# Patient Record
Sex: FEMALE | Race: WHITE | Employment: OTHER | URBAN - NONMETROPOLITAN AREA
[De-identification: names, ages, dates, MRNs, and addresses within clinical notes are randomized per-mention and may not be internally consistent; named-entity substitution may affect disease eponyms.]

---

## 2017-02-08 DIAGNOSIS — E78.5 HYPERLIPIDEMIA, UNSPECIFIED HYPERLIPIDEMIA TYPE: ICD-10-CM

## 2017-02-08 DIAGNOSIS — I15.9 SECONDARY HYPERTENSION: ICD-10-CM

## 2017-02-08 DIAGNOSIS — E11.69 TYPE 2 DIABETES MELLITUS WITH OTHER SPECIFIED COMPLICATION (HCC): ICD-10-CM

## 2017-02-08 LAB
ALBUMIN SERPL-MCNC: 4.5 G/DL (ref 3.5–5.2)
ALP BLD-CCNC: 92 U/L (ref 35–104)
ALT SERPL-CCNC: 40 U/L (ref 5–33)
ANION GAP SERPL CALCULATED.3IONS-SCNC: 16 MMOL/L (ref 7–19)
AST SERPL-CCNC: 23 U/L (ref 5–32)
BACTERIA: ABNORMAL /HPF
BILIRUB SERPL-MCNC: 0.3 MG/DL (ref 0.2–1.2)
BILIRUBIN URINE: ABNORMAL
BLOOD, URINE: NEGATIVE
BUN BLDV-MCNC: 19 MG/DL (ref 6–20)
CALCIUM SERPL-MCNC: 10.2 MG/DL (ref 8.6–10)
CHLORIDE BLD-SCNC: 98 MMOL/L (ref 98–111)
CHOLESTEROL, TOTAL: 145 MG/DL (ref 160–199)
CLARITY: ABNORMAL
CO2: 26 MMOL/L (ref 22–29)
COLOR: YELLOW
CREAT SERPL-MCNC: 0.6 MG/DL (ref 0.5–0.9)
CRYSTALS, UA: ABNORMAL /HPF
EPITHELIAL CELLS, UA: ABNORMAL /HPF
GFR NON-AFRICAN AMERICAN: >60
GLOBULIN: 3.2 G/DL
GLUCOSE BLD-MCNC: 127 MG/DL (ref 74–109)
GLUCOSE URINE: NEGATIVE MG/DL
HBA1C MFR BLD: 7.5 %
HCT VFR BLD CALC: 44.7 % (ref 37–47)
HDLC SERPL-MCNC: 43 MG/DL (ref 65–121)
HEMOGLOBIN: 14.8 G/DL (ref 12–16)
KETONES, URINE: NEGATIVE MG/DL
LDL CHOLESTEROL CALCULATED: 43 MG/DL
LEUKOCYTE ESTERASE, URINE: NEGATIVE
MCH RBC QN AUTO: 30.5 PG (ref 27–31)
MCHC RBC AUTO-ENTMCNC: 33.1 G/DL (ref 33–37)
MCV RBC AUTO: 92 FL (ref 81–99)
NITRITE, URINE: NEGATIVE
PDW BLD-RTO: 13.2 % (ref 11.5–14.5)
PH UA: 5
PLATELET # BLD: 264 K/UL (ref 130–400)
PMV BLD AUTO: 9.9 FL (ref 7.4–10.4)
POTASSIUM SERPL-SCNC: 4.7 MMOL/L (ref 3.5–5)
PROTEIN UA: 30 MG/DL
RBC # BLD: 4.86 M/UL (ref 4.2–5.4)
RBC UA: ABNORMAL /HPF (ref 0–2)
SODIUM BLD-SCNC: 140 MMOL/L (ref 136–145)
SPECIFIC GRAVITY UA: 1.03
TOTAL PROTEIN: 7.7 G/DL (ref 6.6–8.7)
TRIGL SERPL-MCNC: 297 MG/DL (ref 150–199)
UROBILINOGEN, URINE: 0.2 E.U./DL
WBC # BLD: 9.9 K/UL (ref 4.8–10.8)
WBC UA: ABNORMAL /HPF (ref 0–5)

## 2017-02-17 ENCOUNTER — OFFICE VISIT (OUTPATIENT)
Dept: FAMILY MEDICINE CLINIC | Age: 52
End: 2017-02-17
Payer: COMMERCIAL

## 2017-02-17 VITALS
DIASTOLIC BLOOD PRESSURE: 80 MMHG | HEART RATE: 105 BPM | OXYGEN SATURATION: 95 % | BODY MASS INDEX: 40.22 KG/M2 | TEMPERATURE: 96.8 F | HEIGHT: 63 IN | WEIGHT: 227 LBS | SYSTOLIC BLOOD PRESSURE: 130 MMHG

## 2017-02-17 DIAGNOSIS — Z23 NEED FOR INFLUENZA VACCINATION: ICD-10-CM

## 2017-02-17 DIAGNOSIS — Z12.31 SCREENING MAMMOGRAM, ENCOUNTER FOR: ICD-10-CM

## 2017-02-17 DIAGNOSIS — S69.91XA HAND INJURIES, RIGHT, INITIAL ENCOUNTER: ICD-10-CM

## 2017-02-17 PROBLEM — S69.90XA HAND INJURIES: Status: ACTIVE | Noted: 2017-02-17

## 2017-02-17 PROCEDURE — 90688 IIV4 VACCINE SPLT 0.5 ML IM: CPT | Performed by: FAMILY MEDICINE

## 2017-02-17 PROCEDURE — 99214 OFFICE O/P EST MOD 30 MIN: CPT | Performed by: FAMILY MEDICINE

## 2017-02-17 PROCEDURE — 90471 IMMUNIZATION ADMIN: CPT | Performed by: FAMILY MEDICINE

## 2017-02-17 RX ORDER — NABUMETONE 500 MG/1
500 TABLET, FILM COATED ORAL 2 TIMES DAILY
COMMUNITY
End: 2018-12-06 | Stop reason: SDUPTHER

## 2017-02-17 ASSESSMENT — ENCOUNTER SYMPTOMS
RESPIRATORY NEGATIVE: 1
ALLERGIC/IMMUNOLOGIC NEGATIVE: 1
GASTROINTESTINAL NEGATIVE: 1
EYES NEGATIVE: 1

## 2017-04-13 ENCOUNTER — TELEPHONE (OUTPATIENT)
Dept: FAMILY MEDICINE CLINIC | Age: 52
End: 2017-04-13

## 2017-04-19 ENCOUNTER — TELEPHONE (OUTPATIENT)
Dept: FAMILY MEDICINE CLINIC | Age: 52
End: 2017-04-19

## 2017-04-21 RX ORDER — CYCLOBENZAPRINE HCL 10 MG
TABLET ORAL
Qty: 90 TABLET | Refills: 5 | Status: SHIPPED | OUTPATIENT
Start: 2017-04-21 | End: 2017-10-11 | Stop reason: SDUPTHER

## 2017-04-21 RX ORDER — DULOXETIN HYDROCHLORIDE 60 MG/1
CAPSULE, DELAYED RELEASE ORAL
Qty: 30 CAPSULE | Refills: 5 | Status: SHIPPED | OUTPATIENT
Start: 2017-04-21 | End: 2017-10-11 | Stop reason: SDUPTHER

## 2017-04-26 ENCOUNTER — TELEPHONE (OUTPATIENT)
Dept: FAMILY MEDICINE CLINIC | Age: 52
End: 2017-04-26

## 2017-05-12 RX ORDER — HYDROCHLOROTHIAZIDE 12.5 MG/1
CAPSULE, GELATIN COATED ORAL
Qty: 30 CAPSULE | Refills: 5 | Status: SHIPPED | OUTPATIENT
Start: 2017-05-12 | End: 2017-11-13 | Stop reason: SDUPTHER

## 2017-07-17 ENCOUNTER — TELEPHONE (OUTPATIENT)
Dept: FAMILY MEDICINE CLINIC | Age: 52
End: 2017-07-17

## 2017-07-17 DIAGNOSIS — E11.69 TYPE 2 DIABETES MELLITUS WITH OTHER SPECIFIED COMPLICATION (HCC): Primary | ICD-10-CM

## 2017-08-07 LAB
ALBUMIN SERPL-MCNC: 4.1 G/DL (ref 3.5–5.2)
ALP BLD-CCNC: 71 U/L (ref 35–104)
ALT SERPL-CCNC: 33 U/L (ref 5–33)
ANION GAP SERPL CALCULATED.3IONS-SCNC: 15 MMOL/L (ref 7–19)
AST SERPL-CCNC: 20 U/L (ref 5–32)
BILIRUB SERPL-MCNC: <0.2 MG/DL (ref 0.2–1.2)
BUN BLDV-MCNC: 12 MG/DL (ref 6–20)
CALCIUM SERPL-MCNC: 9.6 MG/DL (ref 8.6–10)
CALCIUM SERPL-MCNC: 9.7 MG/DL (ref 8.6–10)
CHLORIDE BLD-SCNC: 101 MMOL/L (ref 98–111)
CHOLESTEROL, TOTAL: 172 MG/DL (ref 160–199)
CO2: 26 MMOL/L (ref 22–29)
CREAT SERPL-MCNC: 0.5 MG/DL (ref 0.5–0.9)
GFR NON-AFRICAN AMERICAN: >60
GLUCOSE BLD-MCNC: 121 MG/DL (ref 74–109)
HBA1C MFR BLD: 7.9 %
HCT VFR BLD CALC: 43.8 % (ref 37–47)
HDLC SERPL-MCNC: 36 MG/DL (ref 65–121)
HEMOGLOBIN: 14.5 G/DL (ref 12–16)
LDL CHOLESTEROL CALCULATED: ABNORMAL MG/DL
LDL CHOLESTEROL DIRECT: 73 MG/DL
MAGNESIUM: 1.9 MG/DL (ref 1.6–2.6)
MCH RBC QN AUTO: 30.7 PG (ref 27–31)
MCHC RBC AUTO-ENTMCNC: 33.1 G/DL (ref 33–37)
MCV RBC AUTO: 92.6 FL (ref 81–99)
MICROALBUMIN UR-MCNC: 1.5 MG/DL (ref 0–19)
PDW BLD-RTO: 13.2 % (ref 11.5–14.5)
PHOSPHORUS: 3.2 MG/DL (ref 2.5–4.5)
PLATELET # BLD: 260 K/UL (ref 130–400)
PMV BLD AUTO: 9.8 FL (ref 9.4–12.3)
POTASSIUM SERPL-SCNC: 4.4 MMOL/L (ref 3.5–5)
RBC # BLD: 4.73 M/UL (ref 4.2–5.4)
SODIUM BLD-SCNC: 142 MMOL/L (ref 136–145)
TOTAL PROTEIN: 7.4 G/DL (ref 6.6–8.7)
TRIGL SERPL-MCNC: 419 MG/DL (ref 150–199)
VITAMIN D 25-HYDROXY: 32.1 NG/ML
WBC # BLD: 8.4 K/UL (ref 4.8–10.8)

## 2017-09-12 RX ORDER — PEN NEEDLE, DIABETIC 32GX 5/32"
NEEDLE, DISPOSABLE MISCELLANEOUS
Qty: 100 PACKAGE | Refills: 5 | Status: SHIPPED | OUTPATIENT
Start: 2017-09-12 | End: 2021-08-25 | Stop reason: ALTCHOICE

## 2017-10-11 RX ORDER — CYCLOBENZAPRINE HCL 10 MG
TABLET ORAL
Qty: 90 TABLET | Refills: 5 | Status: SHIPPED | OUTPATIENT
Start: 2017-10-11 | End: 2018-01-09 | Stop reason: SDUPTHER

## 2017-10-11 RX ORDER — DULOXETIN HYDROCHLORIDE 60 MG/1
CAPSULE, DELAYED RELEASE ORAL
Qty: 30 CAPSULE | Refills: 5 | Status: SHIPPED | OUTPATIENT
Start: 2017-10-11 | End: 2018-01-09 | Stop reason: SDUPTHER

## 2017-11-13 RX ORDER — HYDROCHLOROTHIAZIDE 12.5 MG/1
CAPSULE, GELATIN COATED ORAL
Qty: 30 CAPSULE | Refills: 5 | Status: SHIPPED | OUTPATIENT
Start: 2017-11-13 | End: 2018-04-29 | Stop reason: SDUPTHER

## 2017-11-17 ENCOUNTER — TELEPHONE (OUTPATIENT)
Dept: FAMILY MEDICINE CLINIC | Age: 52
End: 2017-11-17

## 2017-12-12 ENCOUNTER — APPOINTMENT (OUTPATIENT)
Dept: PREADMISSION TESTING | Facility: HOSPITAL | Age: 52
End: 2017-12-12

## 2017-12-12 VITALS
OXYGEN SATURATION: 93 % | WEIGHT: 225.09 LBS | BODY MASS INDEX: 39.88 KG/M2 | DIASTOLIC BLOOD PRESSURE: 60 MMHG | HEIGHT: 63 IN | HEART RATE: 86 BPM | SYSTOLIC BLOOD PRESSURE: 137 MMHG | RESPIRATION RATE: 18 BRPM

## 2017-12-12 DIAGNOSIS — E78.5 HYPERLIPIDEMIA: ICD-10-CM

## 2017-12-12 DIAGNOSIS — I10 ESSENTIAL HYPERTENSION: ICD-10-CM

## 2017-12-12 LAB
ANION GAP SERPL CALCULATED.3IONS-SCNC: 9 MMOL/L (ref 4–13)
BUN BLD-MCNC: 18 MG/DL (ref 5–21)
BUN/CREAT SERPL: 26.9 (ref 7–25)
CALCIUM SPEC-SCNC: 9.8 MG/DL (ref 8.4–10.4)
CHLORIDE SERPL-SCNC: 100 MMOL/L (ref 98–110)
CO2 SERPL-SCNC: 30 MMOL/L (ref 24–31)
CREAT BLD-MCNC: 0.67 MG/DL (ref 0.5–1.4)
DEPRECATED RDW RBC AUTO: 43.9 FL (ref 40–54)
ERYTHROCYTE [DISTWIDTH] IN BLOOD BY AUTOMATED COUNT: 13.1 % (ref 12–15)
GFR SERPL CREATININE-BSD FRML MDRD: 92 ML/MIN/1.73
GLUCOSE BLD-MCNC: 211 MG/DL (ref 70–100)
HCT VFR BLD AUTO: 42 % (ref 37–47)
HGB BLD-MCNC: 13.6 G/DL (ref 12–16)
MCH RBC QN AUTO: 30 PG (ref 28–32)
MCHC RBC AUTO-ENTMCNC: 32.4 G/DL (ref 33–36)
MCV RBC AUTO: 92.5 FL (ref 82–98)
PLATELET # BLD AUTO: 250 10*3/MM3 (ref 130–400)
PMV BLD AUTO: 10.2 FL (ref 6–12)
POTASSIUM BLD-SCNC: 4 MMOL/L (ref 3.5–5.3)
RBC # BLD AUTO: 4.54 10*6/MM3 (ref 4.2–5.4)
SODIUM BLD-SCNC: 139 MMOL/L (ref 135–145)
WBC NRBC COR # BLD: 8.52 10*3/MM3 (ref 4.8–10.8)

## 2017-12-12 PROCEDURE — 85027 COMPLETE CBC AUTOMATED: CPT | Performed by: PODIATRIST

## 2017-12-12 PROCEDURE — 36415 COLL VENOUS BLD VENIPUNCTURE: CPT

## 2017-12-12 PROCEDURE — 93010 ELECTROCARDIOGRAM REPORT: CPT | Performed by: INTERNAL MEDICINE

## 2017-12-12 PROCEDURE — 80048 BASIC METABOLIC PNL TOTAL CA: CPT | Performed by: PODIATRIST

## 2017-12-12 PROCEDURE — 93005 ELECTROCARDIOGRAM TRACING: CPT

## 2017-12-12 RX ORDER — SIMVASTATIN 20 MG
20 TABLET ORAL NIGHTLY
COMMUNITY

## 2017-12-12 RX ORDER — PHENOL 1.4 %
600 AEROSOL, SPRAY (ML) MUCOUS MEMBRANE EVERY OTHER DAY
COMMUNITY

## 2017-12-12 RX ORDER — NABUMETONE 500 MG/1
500 TABLET, FILM COATED ORAL 2 TIMES DAILY
COMMUNITY
End: 2017-12-21 | Stop reason: HOSPADM

## 2017-12-12 RX ORDER — HYDROCHLOROTHIAZIDE 12.5 MG/1
12.5 TABLET ORAL DAILY
COMMUNITY
End: 2019-08-05 | Stop reason: ALTCHOICE

## 2017-12-12 RX ORDER — DULOXETIN HYDROCHLORIDE 60 MG/1
60 CAPSULE, DELAYED RELEASE ORAL DAILY
COMMUNITY
End: 2020-02-05

## 2017-12-12 RX ORDER — GABAPENTIN 600 MG/1
600 TABLET ORAL 3 TIMES DAILY
COMMUNITY
End: 2019-08-05 | Stop reason: ALTCHOICE

## 2017-12-12 RX ORDER — CHOLECALCIFEROL (VITAMIN D3) 125 MCG
1 CAPSULE ORAL DAILY
COMMUNITY

## 2017-12-12 RX ORDER — LOSARTAN POTASSIUM 50 MG/1
50 TABLET ORAL DAILY
COMMUNITY
End: 2020-02-05

## 2017-12-12 RX ORDER — ASPIRIN 81 MG/1
81 TABLET ORAL DAILY
COMMUNITY

## 2017-12-12 RX ORDER — HYDROCODONE BITARTRATE AND ACETAMINOPHEN 7.5; 325 MG/1; MG/1
1 TABLET ORAL EVERY 8 HOURS PRN
COMMUNITY
End: 2017-12-21 | Stop reason: HOSPADM

## 2017-12-12 RX ORDER — CYCLOBENZAPRINE HCL 10 MG
10 TABLET ORAL 3 TIMES DAILY PRN
COMMUNITY
End: 2019-08-05 | Stop reason: ALTCHOICE

## 2017-12-12 NOTE — DISCHARGE INSTRUCTIONS
DAY OF SURGERY INSTRUCTIONS        YOUR SURGEON: FAINA BORRERO    PROCEDURE: TOTAL ANKLE ARTHROPLASTY WITH INFINITY IMPLANT GASTROCNEMIUS RECESSION RIGHT ANKLE    DATE OF SURGERY: December 19TH 2017    ARRIVAL TIME: AS DIRECTED BY OFFICE    DAY OF SURGERY TAKE ONLY THESE MEDICATIONS: 0        BEFORE YOU COME TO THE HOSPITAL  (Pre-op instructions)  • Do not eat, drink, smoke or chew gum after midnight the night before surgery.  This also includes no mints.  • Morning of surgery take only the medicines you have been instructed with a sip of water unless otherwise instructed  by your physician.  • Do not shave, wear makeup or dark nail polish.  • Remove all jewelry including rings.  • Leave anything you consider valuable at home.  • Leave your suitcase in the car until after your surgery.  • Bring the following with you if applicable:  o Picture ID and insurance, Medicare or Medicaid cards  o Co-pay/deductible required by insurance (cash, check, credit card)  o Copy of advance directive, living will or power-of- documents if not brought to PAT  o CPAP or BIPAP mask and tubing  o Relaxation aids (MP3 player, book, magazine)  • On the day of surgery check in at registration located at the main entrance of the hospital.       Outpatient Surgery Guidelines, Adult  Outpatient procedures are those for which the person having the procedure is allowed to go home the same day as the procedure. Various procedures are done on an outpatient basis. You should follow some general guidelines if you will be having an outpatient procedure.  LET YOUR HEALTH CARE PROVIDER KNOW ABOUT:  · Any allergies you have.  · All medicines you are taking, including vitamins, herbs, eye drops, creams, and over-the-counter medicines.  · Previous problems you or members of your family have had with the use of anesthetics.  · Any blood disorders you have.  · Previous surgeries you have had.  · Medical conditions you have.  RISKS AND  COMPLICATIONS  Your health care provider will discuss possible risks and complications with you before surgery. Common risks and complications include:    · Problems due to the use of anesthetics.  · Blood loss and replacement (does not apply to minor surgical procedures).  · Temporary increase in pain due to surgery.  · Uncorrected pain or problems that the surgery was meant to correct.  · Infection.  · New damage.  BEFORE THE PROCEDURE  · Ask your health care provider about changing or stopping your regular medicines. You may need to stop taking certain medicines in the days or weeks before the procedure.  · Stop smoking at least 2 weeks before surgery. This lowers your risk for complications during and after surgery. Ask your health care provider for help with this if needed.  · Eat your usual meals and a light supper the day before surgery. Continue fluid intake. Do not drink alcohol.  · Do not eat or drink after midnight the night before your surgery.   · Arrange for someone to take you home and to stay with you for 24 hours after the procedure. Medicine given for your procedure may affect your ability to drive or to care for yourself.  · Call your health care provider's office if you develop an illness or problem that may prevent you from safely having your procedure.  AFTER THE PROCEDURE  After surgery, you will be taken to a recovery area, where your progress will be monitored. If there are no complications, you will be allowed to go home when you are awake, stable, and taking fluids well. You may have numbness around the surgical site. Healing will take some time. You will have tenderness at the surgical site and may have some swelling and bruising. You may also have some nausea.  HOME CARE INSTRUCTIONS  · Do not drive for 24 hours, or as directed by your health care provider. Do not drive while taking prescription pain medicines.  · Do not drink alcohol for 24 hours.  · Do not make important decisions or  sign legal documents for 24 hours.  · You may resume a normal diet and activities as directed.  · Do not lift anything heavier than 10 pounds (4.5 kg) or play contact sports until your health care provider says it is okay.  · Change your bandages (dressings) as directed.  · Only take over-the-counter or prescription medicines as directed by your health care provider.  · Follow up with your health care provider as directed.  SEEK MEDICAL CARE IF:  · You have increased bleeding (more than a small spot) from the surgical site.  · You have redness, swelling, or increasing pain in the wound.  · You see pus coming from the wound.  · You have a fever.  · You notice a bad smell coming from the wound or dressing.  · You feel lightheaded or faint.  · You develop a rash.  · You have trouble breathing.  · You develop allergies.  MAKE SURE YOU:  · Understand these instructions.  · Will watch your condition.  · Will get help right away if you are not doing well or get worse.     This information is not intended to replace advice given to you by your health care provider. Make sure you discuss any questions you have with your health care provider.     Document Released: 09/12/2002 Document Revised: 05/03/2016 Document Reviewed: 05/22/2014  [employer] Interactive Patient Education ©2016 [employer]       Fall Prevention in Hospitals, Adult  As a hospital patient, your condition and the treatments you receive can increase your risk for falls. Some additional risk factors for falls in a hospital include:  · Being in an unfamiliar environment.  · Being on bed rest.  · Your surgery.  · Taking certain medicines.  · Your tubing requirements, such as intravenous (IV) therapy or catheters.  It is important that you learn how to decrease fall risks while at the hospital. Below are important tips that can help prevent falls.  SAFETY TIPS FOR PREVENTING FALLS  Talk about your risk of falling.  · Ask your health care provider why you are at  risk for falling. Is it your medicine, illness, tubing placement, or something else?  · Make a plan with your health care provider to keep you safe from falls.  · Ask your health care provider or pharmacist about side effects of your medicines. Some medicines can make you dizzy or affect your coordination.  Ask for help.  · Ask for help before getting out of bed. You may need to press your call button.  · Ask for assistance in getting safely to the toilet.  · Ask for a walker or cane to be put at your bedside. Ask that most of the side rails on your bed be placed up before your health care provider leaves the room.  · Ask family or friends to sit with you.  · Ask for things that are out of your reach, such as your glasses, hearing aids, telephone, bedside table, or call button.  Follow these tips to avoid falling:  · Stay lying or seated, rather than standing, while waiting for help.  · Wear rubber-soled slippers or shoes whenever you walk in the hospital.  · Avoid quick, sudden movements.  ¨ Change positions slowly.  ¨ Sit on the side of your bed before standing.  ¨ Stand up slowly and wait before you start to walk.  · Let your health care provider know if there is a spill on the floor.  · Pay careful attention to the medical equipment, electrical cords, and tubes around you.  · When you need help, use your call button by your bed or in the bathroom. Wait for one of your health care providers to help you.  · If you feel dizzy or unsure of your footing, return to bed and wait for assistance.  · Avoid being distracted by the TV, telephone, or another person in your room.  · Do not lean or support yourself on rolling objects, such as IV poles or bedside tables.     This information is not intended to replace advice given to you by your health care provider. Make sure you discuss any questions you have with your health care provider.     Document Released: 12/15/2001 Document Revised: 01/08/2016 Document Reviewed:  08/25/2013  [employer] Interactive Patient Education ©2016 [employer]       Surgical Site Infections FAQs  What is a Surgical Site Infection (SSI)?  A surgical site infection is an infection that occurs after surgery in the part of the body where the surgery took place. Most patients who have surgery do not develop an infection. However, infections develop in about 1 to 3 out of every 100 patients who have surgery.  Some of the common symptoms of a surgical site infection are:  · Redness and pain around the area where you had surgery  · Drainage of cloudy fluid from your surgical wound  · Fever  Can SSIs be treated?  Yes. Most surgical site infections can be treated with antibiotics. The antibiotic given to you depends on the bacteria (germs) causing the infection. Sometimes patients with SSIs also need another surgery to treat the infection.  What are some of the things that hospitals are doing to prevent SSIs?  To prevent SSIs, doctors, nurses, and other healthcare providers:  · Clean their hands and arms up to their elbows with an antiseptic agent just before the surgery.  · Clean their hands with soap and water or an alcohol-based hand rub before and after caring for each patient.  · May remove some of your hair immediately before your surgery using electric clippers if the hair is in the same area where the procedure will occur. They should not shave you with a razor.  · Wear special hair covers, masks, gowns, and gloves during surgery to keep the surgery area clean.  · Give you antibiotics before your surgery starts. In most cases, you should get antibiotics within 60 minutes before the surgery starts and the antibiotics should be stopped within 24 hours after surgery.  · Clean the skin at the site of your surgery with a special soap that kills germs.  What can I do to help prevent SSIs?  Before your surgery:  · Tell your doctor about other medical problems you may have. Health problems such as allergies,  diabetes, and obesity could affect your surgery and your treatment.  · Quit smoking. Patients who smoke get more infections. Talk to your doctor about how you can quit before your surgery.  · Do not shave near where you will have surgery. Shaving with a razor can irritate your skin and make it easier to develop an infection.  At the time of your surgery:  · Speak up if someone tries to shave you with a razor before surgery. Ask why you need to be shaved and talk with your surgeon if you have any concerns.  · Ask if you will get antibiotics before surgery.  After your surgery:  · Make sure that your healthcare providers clean their hands before examining you, either with soap and water or an alcohol-based hand rub.  · If you do not see your providers clean their hands, please ask them to do so.  · Family and friends who visit you should not touch the surgical wound or dressings.  · Family and friends should clean their hands with soap and water or an alcohol-based hand rub before and after visiting you. If you do not see them clean their hands, ask them to clean their hands.  What do I need to do when I go home from the hospital?  · Before you go home, your doctor or nurse should explain everything you need to know about taking care of your wound. Make sure you understand how to care for your wound before you leave the hospital.  · Always clean your hands before and after caring for your wound.  · Before you go home, make sure you know who to contact if you have questions or problems after you get home.  · If you have any symptoms of an infection, such as redness and pain at the surgery site, drainage, or fever, call your doctor immediately.  If you have additional questions, please ask your doctor or nurse.  Developed and co-sponsored by The Society for Healthcare Epidemiology of [place] (SHEA); Infectious Diseases Society of [place] (IDSA); American Hospital Association; Association for Professionals in Infection  Control and Epidemiology (APIC); Centers for Disease Control and Prevention (CDC); and The Joint Commission.     This information is not intended to replace advice given to you by your health care provider. Make sure you discuss any questions you have with your health care provider.     Document Released: 12/23/2014 Document Revised: 01/08/2016 Document Reviewed: 03/02/2016  [employer] Interactive Patient Education ©2016 [employer]       [facility]  CHG 4% Patient Instruction Sheet    Preparing the Skin Before Surgery  Preparing or “prepping” skin before surgery can reduce the risk of infection at the surgical site. To make the process easier,[facility] has chosen 4% Chlorhexidine Gluconate (CHG) antiseptic solution.   The steps below outline the prepping process and should be carefully followed.                                                                                                                                                      Prep the skin at the following time(s):                                                      We recommend you shower the night before surgery, and again the morning of surgery with the 4% CHG antiseptic solution using half of the bottle and a cloth each time.  Dress in clean clothes/sleepwear after showering.  See instructions below for application.          Do not apply any lotions or moisturizers.       Do not shave the area to be prepped for at least 2 days prior to surgery.    Clipping the hair may be done immediately prior to your surgery at the hospital    if needed.    Directions:  Thoroughly rinse your body with water.  Apply 4% CHG to a cloth and wash skin gently, paying special attention to the operative site.  Rinse again thoroughly.  Once you have begun using this product do not apply anything else to your skin. If itching or redness persists, rinse affected areas and discontinue use.    When using this product:  • Keep out of eyes, ears, and mouth.  • If  solution should contact these areas, rinse out promptly and thoroughly with water.  • For external use only.  • Do not use in genital area, on your face or head.      PATIENT/FAMILY/RESPONSIBLE PARTY VERBALIZES UNDERSTANDING OF ABOVE EDUCATION.  COPY OF PAIN SCALE GIVEN AND REVIEWED WITH VERBALIZED UNDERSTANDING.

## 2017-12-13 RX ORDER — LOSARTAN POTASSIUM 50 MG/1
TABLET ORAL
Qty: 90 TABLET | Refills: 1 | Status: SHIPPED | OUTPATIENT
Start: 2017-12-13 | End: 2018-01-09 | Stop reason: SDUPTHER

## 2017-12-13 RX ORDER — SIMVASTATIN 20 MG
TABLET ORAL
Qty: 90 TABLET | Refills: 1 | Status: SHIPPED | OUTPATIENT
Start: 2017-12-13 | End: 2018-01-09 | Stop reason: SDUPTHER

## 2017-12-13 RX ORDER — INSULIN GLARGINE 300 U/ML
INJECTION, SOLUTION SUBCUTANEOUS
Qty: 3 PEN | Refills: 7 | Status: SHIPPED | OUTPATIENT
Start: 2017-12-13 | End: 2018-01-09 | Stop reason: SDUPTHER

## 2017-12-19 ENCOUNTER — APPOINTMENT (OUTPATIENT)
Dept: GENERAL RADIOLOGY | Facility: HOSPITAL | Age: 52
End: 2017-12-19

## 2017-12-19 ENCOUNTER — ANESTHESIA EVENT (OUTPATIENT)
Dept: PERIOP | Facility: HOSPITAL | Age: 52
End: 2017-12-19

## 2017-12-19 ENCOUNTER — ANESTHESIA (OUTPATIENT)
Dept: PERIOP | Facility: HOSPITAL | Age: 52
End: 2017-12-19

## 2017-12-19 ENCOUNTER — HOSPITAL ENCOUNTER (INPATIENT)
Facility: HOSPITAL | Age: 52
LOS: 2 days | Discharge: HOME | End: 2017-12-21
Attending: PODIATRIST | Admitting: PODIATRIST

## 2017-12-19 DIAGNOSIS — Z74.09 IMPAIRED MOBILITY: ICD-10-CM

## 2017-12-19 PROBLEM — M19.171 POST-TRAUMATIC ARTHRITIS OF ANKLE, RIGHT: Status: ACTIVE | Noted: 2017-12-19

## 2017-12-19 LAB
AVERAGE GLUCOSE: NORMAL
GLUCOSE BLDC GLUCOMTR-MCNC: 104 MG/DL (ref 70–130)
GLUCOSE BLDC GLUCOMTR-MCNC: 120 MG/DL (ref 70–130)
GLUCOSE BLDC GLUCOMTR-MCNC: 196 MG/DL (ref 70–130)
GLUCOSE BLDC GLUCOMTR-MCNC: 279 MG/DL (ref 70–130)
HBA1C MFR BLD: 6.4 %
HBA1C MFR BLD: 6.4 %

## 2017-12-19 PROCEDURE — 25010000002 SUCCINYLCHOLINE PER 20 MG: Performed by: NURSE ANESTHETIST, CERTIFIED REGISTERED

## 2017-12-19 PROCEDURE — 82962 GLUCOSE BLOOD TEST: CPT

## 2017-12-19 PROCEDURE — 0SRF0JZ REPLACEMENT OF RIGHT ANKLE JOINT WITH SYNTHETIC SUBSTITUTE, OPEN APPROACH: ICD-10-PCS | Performed by: PODIATRIST

## 2017-12-19 PROCEDURE — 94799 UNLISTED PULMONARY SVC/PX: CPT

## 2017-12-19 PROCEDURE — 25010000002 DEXAMETHASONE PER 1 MG: Performed by: NURSE ANESTHETIST, CERTIFIED REGISTERED

## 2017-12-19 PROCEDURE — 25010000002 CEFAZOLIN PER 500 MG: Performed by: PODIATRIST

## 2017-12-19 PROCEDURE — 25010000002 MORPHINE PER 10 MG: Performed by: NURSE ANESTHETIST, CERTIFIED REGISTERED

## 2017-12-19 PROCEDURE — 25010000002 ONDANSETRON PER 1 MG: Performed by: NURSE ANESTHETIST, CERTIFIED REGISTERED

## 2017-12-19 PROCEDURE — 73600 X-RAY EXAM OF ANKLE: CPT

## 2017-12-19 PROCEDURE — 25010000003 CEFAZOLIN PER 500 MG: Performed by: PODIATRIST

## 2017-12-19 PROCEDURE — 83036 HEMOGLOBIN GLYCOSYLATED A1C: CPT | Performed by: NURSE PRACTITIONER

## 2017-12-19 PROCEDURE — 63710000001 INSULIN LISPRO (HUMAN) PER 5 UNITS: Performed by: NURSE PRACTITIONER

## 2017-12-19 PROCEDURE — 25010000002 HYDROMORPHONE PER 4 MG: Performed by: PODIATRIST

## 2017-12-19 PROCEDURE — 25010000002 MIDAZOLAM PER 1 MG: Performed by: ANESTHESIOLOGY

## 2017-12-19 PROCEDURE — C1776 JOINT DEVICE (IMPLANTABLE): HCPCS | Performed by: PODIATRIST

## 2017-12-19 PROCEDURE — 25010000002 PROPOFOL 10 MG/ML EMULSION: Performed by: NURSE ANESTHETIST, CERTIFIED REGISTERED

## 2017-12-19 PROCEDURE — C1713 ANCHOR/SCREW BN/BN,TIS/BN: HCPCS | Performed by: PODIATRIST

## 2017-12-19 PROCEDURE — 0L8N0ZZ DIVISION OF RIGHT LOWER LEG TENDON, OPEN APPROACH: ICD-10-PCS | Performed by: PODIATRIST

## 2017-12-19 PROCEDURE — 25010000002 HYDROMORPHONE PER 4 MG: Performed by: ANESTHESIOLOGY

## 2017-12-19 PROCEDURE — 25010000002 ONDANSETRON PER 1 MG: Performed by: ANESTHESIOLOGY

## 2017-12-19 PROCEDURE — 76000 FLUOROSCOPY <1 HR PHYS/QHP: CPT

## 2017-12-19 PROCEDURE — 25010000002 FENTANYL CITRATE (PF) 100 MCG/2ML SOLUTION: Performed by: ANESTHESIOLOGY

## 2017-12-19 PROCEDURE — 25010000002 FENTANYL CITRATE (PF) 250 MCG/5ML SOLUTION: Performed by: NURSE ANESTHETIST, CERTIFIED REGISTERED

## 2017-12-19 DEVICE — IMPLANTABLE DEVICE
Type: IMPLANTABLE DEVICE | Status: FUNCTIONAL
Brand: INFINITY

## 2017-12-19 DEVICE — ALLOGRFT ACTISHIELD AMNIO BARR 4X8CM: Type: IMPLANTABLE DEVICE | Status: FUNCTIONAL

## 2017-12-19 RX ORDER — OXYCODONE AND ACETAMINOPHEN 10; 325 MG/1; MG/1
1 TABLET ORAL EVERY 4 HOURS PRN
Status: DISCONTINUED | OUTPATIENT
Start: 2017-12-19 | End: 2017-12-21 | Stop reason: HOSPADM

## 2017-12-19 RX ORDER — FENTANYL CITRATE 50 UG/ML
INJECTION, SOLUTION INTRAMUSCULAR; INTRAVENOUS AS NEEDED
Status: DISCONTINUED | OUTPATIENT
Start: 2017-12-19 | End: 2017-12-19 | Stop reason: SURG

## 2017-12-19 RX ORDER — DEXAMETHASONE SODIUM PHOSPHATE 4 MG/ML
INJECTION, SOLUTION INTRA-ARTICULAR; INTRALESIONAL; INTRAMUSCULAR; INTRAVENOUS; SOFT TISSUE AS NEEDED
Status: DISCONTINUED | OUTPATIENT
Start: 2017-12-19 | End: 2017-12-19 | Stop reason: SURG

## 2017-12-19 RX ORDER — MAGNESIUM HYDROXIDE 1200 MG/15ML
LIQUID ORAL AS NEEDED
Status: DISCONTINUED | OUTPATIENT
Start: 2017-12-19 | End: 2017-12-19 | Stop reason: HOSPADM

## 2017-12-19 RX ORDER — HYDROCHLOROTHIAZIDE 25 MG/1
12.5 TABLET ORAL DAILY
Status: DISCONTINUED | OUTPATIENT
Start: 2017-12-19 | End: 2017-12-21 | Stop reason: HOSPADM

## 2017-12-19 RX ORDER — PHENYLEPHRINE HCL IN 0.9% NACL 0.8MG/10ML
SYRINGE (ML) INTRAVENOUS AS NEEDED
Status: DISCONTINUED | OUTPATIENT
Start: 2017-12-19 | End: 2017-12-19 | Stop reason: SURG

## 2017-12-19 RX ORDER — CEFAZOLIN SODIUM 2 G/100ML
2 INJECTION, SOLUTION INTRAVENOUS EVERY 8 HOURS
Status: COMPLETED | OUTPATIENT
Start: 2017-12-19 | End: 2017-12-20

## 2017-12-19 RX ORDER — SODIUM CHLORIDE 0.9 % (FLUSH) 0.9 %
3 SYRINGE (ML) INJECTION AS NEEDED
Status: DISCONTINUED | OUTPATIENT
Start: 2017-12-19 | End: 2017-12-19 | Stop reason: HOSPADM

## 2017-12-19 RX ORDER — LOSARTAN POTASSIUM 50 MG/1
50 TABLET ORAL DAILY
Status: DISCONTINUED | OUTPATIENT
Start: 2017-12-19 | End: 2017-12-21 | Stop reason: HOSPADM

## 2017-12-19 RX ORDER — ATORVASTATIN CALCIUM 10 MG/1
10 TABLET, FILM COATED ORAL NIGHTLY
Status: DISCONTINUED | OUTPATIENT
Start: 2017-12-19 | End: 2017-12-21 | Stop reason: HOSPADM

## 2017-12-19 RX ORDER — METOCLOPRAMIDE HYDROCHLORIDE 5 MG/ML
5 INJECTION INTRAMUSCULAR; INTRAVENOUS
Status: DISCONTINUED | OUTPATIENT
Start: 2017-12-19 | End: 2017-12-19 | Stop reason: HOSPADM

## 2017-12-19 RX ORDER — MORPHINE SULFATE 10 MG/ML
INJECTION INTRAMUSCULAR; INTRAVENOUS; SUBCUTANEOUS AS NEEDED
Status: DISCONTINUED | OUTPATIENT
Start: 2017-12-19 | End: 2017-12-19 | Stop reason: SURG

## 2017-12-19 RX ORDER — ROCURONIUM BROMIDE 10 MG/ML
INJECTION, SOLUTION INTRAVENOUS AS NEEDED
Status: DISCONTINUED | OUTPATIENT
Start: 2017-12-19 | End: 2017-12-19 | Stop reason: SURG

## 2017-12-19 RX ORDER — ONDANSETRON 2 MG/ML
4 INJECTION INTRAMUSCULAR; INTRAVENOUS AS NEEDED
Status: DISCONTINUED | OUTPATIENT
Start: 2017-12-19 | End: 2017-12-19 | Stop reason: HOSPADM

## 2017-12-19 RX ORDER — DULOXETIN HYDROCHLORIDE 30 MG/1
60 CAPSULE, DELAYED RELEASE ORAL DAILY
Status: DISCONTINUED | OUTPATIENT
Start: 2017-12-19 | End: 2017-12-21 | Stop reason: HOSPADM

## 2017-12-19 RX ORDER — SODIUM CHLORIDE, SODIUM LACTATE, POTASSIUM CHLORIDE, CALCIUM CHLORIDE 600; 310; 30; 20 MG/100ML; MG/100ML; MG/100ML; MG/100ML
100 INJECTION, SOLUTION INTRAVENOUS CONTINUOUS
Status: DISCONTINUED | OUTPATIENT
Start: 2017-12-19 | End: 2017-12-21 | Stop reason: HOSPADM

## 2017-12-19 RX ORDER — MORPHINE SULFATE 2 MG/ML
2 INJECTION, SOLUTION INTRAMUSCULAR; INTRAVENOUS AS NEEDED
Status: DISCONTINUED | OUTPATIENT
Start: 2017-12-19 | End: 2017-12-19 | Stop reason: HOSPADM

## 2017-12-19 RX ORDER — MEPERIDINE HYDROCHLORIDE 25 MG/ML
12.5 INJECTION INTRAMUSCULAR; INTRAVENOUS; SUBCUTANEOUS
Status: DISCONTINUED | OUTPATIENT
Start: 2017-12-19 | End: 2017-12-19 | Stop reason: HOSPADM

## 2017-12-19 RX ORDER — LABETALOL HYDROCHLORIDE 5 MG/ML
5 INJECTION, SOLUTION INTRAVENOUS
Status: DISCONTINUED | OUTPATIENT
Start: 2017-12-19 | End: 2017-12-19 | Stop reason: HOSPADM

## 2017-12-19 RX ORDER — IPRATROPIUM BROMIDE AND ALBUTEROL SULFATE 2.5; .5 MG/3ML; MG/3ML
3 SOLUTION RESPIRATORY (INHALATION) ONCE AS NEEDED
Status: DISCONTINUED | OUTPATIENT
Start: 2017-12-19 | End: 2017-12-19 | Stop reason: HOSPADM

## 2017-12-19 RX ORDER — GABAPENTIN 300 MG/1
600 CAPSULE ORAL EVERY 8 HOURS SCHEDULED
Status: DISCONTINUED | OUTPATIENT
Start: 2017-12-19 | End: 2017-12-21 | Stop reason: HOSPADM

## 2017-12-19 RX ORDER — HYDRALAZINE HYDROCHLORIDE 20 MG/ML
5 INJECTION INTRAMUSCULAR; INTRAVENOUS
Status: DISCONTINUED | OUTPATIENT
Start: 2017-12-19 | End: 2017-12-19 | Stop reason: HOSPADM

## 2017-12-19 RX ORDER — SODIUM CHLORIDE, SODIUM LACTATE, POTASSIUM CHLORIDE, CALCIUM CHLORIDE 600; 310; 30; 20 MG/100ML; MG/100ML; MG/100ML; MG/100ML
1000 INJECTION, SOLUTION INTRAVENOUS CONTINUOUS
Status: DISPENSED | OUTPATIENT
Start: 2017-12-19 | End: 2017-12-21

## 2017-12-19 RX ORDER — FENTANYL CITRATE 50 UG/ML
100 INJECTION, SOLUTION INTRAMUSCULAR; INTRAVENOUS ONCE
Status: COMPLETED | OUTPATIENT
Start: 2017-12-19 | End: 2017-12-19

## 2017-12-19 RX ORDER — FLUMAZENIL 0.1 MG/ML
0.2 INJECTION INTRAVENOUS AS NEEDED
Status: DISCONTINUED | OUTPATIENT
Start: 2017-12-19 | End: 2017-12-19 | Stop reason: HOSPADM

## 2017-12-19 RX ORDER — SUCCINYLCHOLINE CHLORIDE 20 MG/ML
INJECTION INTRAMUSCULAR; INTRAVENOUS AS NEEDED
Status: DISCONTINUED | OUTPATIENT
Start: 2017-12-19 | End: 2017-12-19 | Stop reason: SURG

## 2017-12-19 RX ORDER — DIAZEPAM 5 MG/1
5 TABLET ORAL EVERY 6 HOURS PRN
Status: DISCONTINUED | OUTPATIENT
Start: 2017-12-19 | End: 2017-12-21 | Stop reason: HOSPADM

## 2017-12-19 RX ORDER — MIDAZOLAM HYDROCHLORIDE 1 MG/ML
2 INJECTION INTRAMUSCULAR; INTRAVENOUS
Status: DISCONTINUED | OUTPATIENT
Start: 2017-12-19 | End: 2017-12-19 | Stop reason: HOSPADM

## 2017-12-19 RX ORDER — SCOLOPAMINE TRANSDERMAL SYSTEM 1 MG/1
1 PATCH, EXTENDED RELEASE TRANSDERMAL ONCE
Status: DISCONTINUED | OUTPATIENT
Start: 2017-12-19 | End: 2017-12-20

## 2017-12-19 RX ORDER — HYDROMORPHONE HCL 110MG/55ML
0.5 PATIENT CONTROLLED ANALGESIA SYRINGE INTRAVENOUS
Status: DISCONTINUED | OUTPATIENT
Start: 2017-12-19 | End: 2017-12-21 | Stop reason: HOSPADM

## 2017-12-19 RX ORDER — ONDANSETRON 2 MG/ML
INJECTION INTRAMUSCULAR; INTRAVENOUS AS NEEDED
Status: DISCONTINUED | OUTPATIENT
Start: 2017-12-19 | End: 2017-12-19 | Stop reason: SURG

## 2017-12-19 RX ORDER — SODIUM CHLORIDE 0.9 % (FLUSH) 0.9 %
1-10 SYRINGE (ML) INJECTION AS NEEDED
Status: DISCONTINUED | OUTPATIENT
Start: 2017-12-19 | End: 2017-12-19 | Stop reason: HOSPADM

## 2017-12-19 RX ORDER — ONDANSETRON 2 MG/ML
4 INJECTION INTRAMUSCULAR; INTRAVENOUS EVERY 6 HOURS PRN
Status: DISCONTINUED | OUTPATIENT
Start: 2017-12-19 | End: 2017-12-21 | Stop reason: HOSPADM

## 2017-12-19 RX ORDER — CYCLOBENZAPRINE HCL 10 MG
10 TABLET ORAL 3 TIMES DAILY PRN
Status: DISCONTINUED | OUTPATIENT
Start: 2017-12-19 | End: 2017-12-21 | Stop reason: HOSPADM

## 2017-12-19 RX ORDER — NICOTINE 21 MG/24HR
1 PATCH, TRANSDERMAL 24 HOURS TRANSDERMAL EVERY 24 HOURS
Status: DISCONTINUED | OUTPATIENT
Start: 2017-12-19 | End: 2017-12-21 | Stop reason: HOSPADM

## 2017-12-19 RX ORDER — NICOTINE POLACRILEX 4 MG
15 LOZENGE BUCCAL
Status: DISCONTINUED | OUTPATIENT
Start: 2017-12-19 | End: 2017-12-21 | Stop reason: HOSPADM

## 2017-12-19 RX ORDER — NALOXONE HCL 0.4 MG/ML
0.04 VIAL (ML) INJECTION AS NEEDED
Status: DISCONTINUED | OUTPATIENT
Start: 2017-12-19 | End: 2017-12-19 | Stop reason: HOSPADM

## 2017-12-19 RX ORDER — NALOXONE HCL 0.4 MG/ML
0.4 VIAL (ML) INJECTION
Status: DISCONTINUED | OUTPATIENT
Start: 2017-12-19 | End: 2017-12-21 | Stop reason: HOSPADM

## 2017-12-19 RX ORDER — PROMETHAZINE HYDROCHLORIDE 25 MG/ML
12.5 INJECTION, SOLUTION INTRAMUSCULAR; INTRAVENOUS EVERY 4 HOURS PRN
Status: DISCONTINUED | OUTPATIENT
Start: 2017-12-19 | End: 2017-12-21 | Stop reason: HOSPADM

## 2017-12-19 RX ORDER — PROPOFOL 10 MG/ML
VIAL (ML) INTRAVENOUS AS NEEDED
Status: DISCONTINUED | OUTPATIENT
Start: 2017-12-19 | End: 2017-12-19 | Stop reason: SURG

## 2017-12-19 RX ORDER — MIDAZOLAM HYDROCHLORIDE 1 MG/ML
1 INJECTION INTRAMUSCULAR; INTRAVENOUS
Status: DISCONTINUED | OUTPATIENT
Start: 2017-12-19 | End: 2017-12-19 | Stop reason: HOSPADM

## 2017-12-19 RX ORDER — DEXTROSE MONOHYDRATE 25 G/50ML
25 INJECTION, SOLUTION INTRAVENOUS
Status: DISCONTINUED | OUTPATIENT
Start: 2017-12-19 | End: 2017-12-21 | Stop reason: HOSPADM

## 2017-12-19 RX ADMIN — CYCLOBENZAPRINE HYDROCHLORIDE 10 MG: 10 TABLET, FILM COATED ORAL at 17:38

## 2017-12-19 RX ADMIN — ATORVASTATIN CALCIUM 10 MG: 10 TABLET, FILM COATED ORAL at 21:52

## 2017-12-19 RX ADMIN — FENTANYL CITRATE 250 MCG: 50 INJECTION INTRAMUSCULAR; INTRAVENOUS at 11:45

## 2017-12-19 RX ADMIN — MORPHINE SULFATE 10 MG: 10 INJECTION, SOLUTION INTRAMUSCULAR; INTRAVENOUS at 13:42

## 2017-12-19 RX ADMIN — HYDROMORPHONE HYDROCHLORIDE 1 MG: 1 INJECTION, SOLUTION INTRAMUSCULAR; INTRAVENOUS; SUBCUTANEOUS at 15:35

## 2017-12-19 RX ADMIN — DIAZEPAM 5 MG: 5 TABLET ORAL at 16:57

## 2017-12-19 RX ADMIN — MIDAZOLAM HYDROCHLORIDE 2 MG: 1 INJECTION, SOLUTION INTRAMUSCULAR; INTRAVENOUS at 10:24

## 2017-12-19 RX ADMIN — Medication 80 MCG: at 12:00

## 2017-12-19 RX ADMIN — SODIUM CHLORIDE, POTASSIUM CHLORIDE, SODIUM LACTATE AND CALCIUM CHLORIDE 100 ML/HR: 600; 310; 30; 20 INJECTION, SOLUTION INTRAVENOUS at 11:00

## 2017-12-19 RX ADMIN — CEFAZOLIN SODIUM 2 G: 2 INJECTION, SOLUTION INTRAVENOUS at 21:53

## 2017-12-19 RX ADMIN — CHOLECALCIFEROL CAP 125 MCG (5000 UNIT) 5000 UNITS: 125 CAP at 17:38

## 2017-12-19 RX ADMIN — CEFAZOLIN SODIUM 2 G: 2 SOLUTION INTRAVENOUS at 11:49

## 2017-12-19 RX ADMIN — HYDROMORPHONE HYDROCHLORIDE 0.5 MG: 1 INJECTION, SOLUTION INTRAMUSCULAR; INTRAVENOUS; SUBCUTANEOUS at 15:09

## 2017-12-19 RX ADMIN — SCOPALAMINE 1 PATCH: 1 PATCH, EXTENDED RELEASE TRANSDERMAL at 10:19

## 2017-12-19 RX ADMIN — DEXAMETHASONE SODIUM PHOSPHATE 4 MG: 4 INJECTION, SOLUTION INTRAMUSCULAR; INTRAVENOUS at 13:42

## 2017-12-19 RX ADMIN — ONDANSETRON HYDROCHLORIDE 4 MG: 2 SOLUTION INTRAMUSCULAR; INTRAVENOUS at 13:42

## 2017-12-19 RX ADMIN — HYDROCHLOROTHIAZIDE 12.5 MG: 25 TABLET ORAL at 17:37

## 2017-12-19 RX ADMIN — INSULIN LISPRO 4 UNITS: 100 INJECTION, SOLUTION INTRAVENOUS; SUBCUTANEOUS at 22:08

## 2017-12-19 RX ADMIN — HYDROMORPHONE HYDROCHLORIDE 0.5 MG: 2 INJECTION, SOLUTION INTRAMUSCULAR; INTRAVENOUS; SUBCUTANEOUS at 20:00

## 2017-12-19 RX ADMIN — ONDANSETRON 4 MG: 2 INJECTION, SOLUTION INTRAMUSCULAR; INTRAVENOUS at 14:56

## 2017-12-19 RX ADMIN — LIDOCAINE HYDROCHLORIDE 0.5 ML: 10 INJECTION, SOLUTION EPIDURAL; INFILTRATION; INTRACAUDAL; PERINEURAL at 09:54

## 2017-12-19 RX ADMIN — SODIUM CHLORIDE, POTASSIUM CHLORIDE, SODIUM LACTATE AND CALCIUM CHLORIDE 1000 ML: 600; 310; 30; 20 INJECTION, SOLUTION INTRAVENOUS at 15:13

## 2017-12-19 RX ADMIN — LOSARTAN POTASSIUM 50 MG: 50 TABLET ORAL at 17:38

## 2017-12-19 RX ADMIN — PROPOFOL 200 MG: 10 INJECTION, EMULSION INTRAVENOUS at 11:45

## 2017-12-19 RX ADMIN — INSULIN LISPRO 2 UNITS: 100 INJECTION, SOLUTION INTRAVENOUS; SUBCUTANEOUS at 18:20

## 2017-12-19 RX ADMIN — SODIUM CHLORIDE, POTASSIUM CHLORIDE, SODIUM LACTATE AND CALCIUM CHLORIDE 1000 ML: 600; 310; 30; 20 INJECTION, SOLUTION INTRAVENOUS at 09:54

## 2017-12-19 RX ADMIN — GABAPENTIN 600 MG: 300 CAPSULE ORAL at 18:20

## 2017-12-19 RX ADMIN — SODIUM CHLORIDE, POTASSIUM CHLORIDE, SODIUM LACTATE AND CALCIUM CHLORIDE: 600; 310; 30; 20 INJECTION, SOLUTION INTRAVENOUS at 12:15

## 2017-12-19 RX ADMIN — Medication 80 MCG: at 12:38

## 2017-12-19 RX ADMIN — MIDAZOLAM HYDROCHLORIDE 2 MG: 1 INJECTION, SOLUTION INTRAMUSCULAR; INTRAVENOUS at 10:20

## 2017-12-19 RX ADMIN — HYDROMORPHONE HYDROCHLORIDE 0.5 MG: 1 INJECTION, SOLUTION INTRAMUSCULAR; INTRAVENOUS; SUBCUTANEOUS at 14:57

## 2017-12-19 RX ADMIN — SUCCINYLCHOLINE CHLORIDE 160 MG: 20 INJECTION, SOLUTION INTRAMUSCULAR; INTRAVENOUS at 11:45

## 2017-12-19 RX ADMIN — Medication 80 MCG: at 12:21

## 2017-12-19 RX ADMIN — NICOTINE 1 PATCH: 21 PATCH, EXTENDED RELEASE TRANSDERMAL at 17:38

## 2017-12-19 RX ADMIN — FENTANYL CITRATE 100 MCG: 50 INJECTION, SOLUTION INTRAMUSCULAR; INTRAVENOUS at 10:19

## 2017-12-19 RX ADMIN — ROCURONIUM BROMIDE 5 MG: 10 INJECTION INTRAVENOUS at 11:45

## 2017-12-19 RX ADMIN — DULOXETINE HYDROCHLORIDE 60 MG: 30 CAPSULE, DELAYED RELEASE ORAL at 17:38

## 2017-12-19 RX ADMIN — Medication 80 MCG: at 12:10

## 2017-12-19 RX ADMIN — OXYCODONE HYDROCHLORIDE AND ACETAMINOPHEN 1 TABLET: 10; 325 TABLET ORAL at 21:52

## 2017-12-19 NOTE — PLAN OF CARE
Problem: Perioperative Period (Adult)  Goal: Signs and Symptoms of Listed Potential Problems Will be Absent or Manageable (Perioperative Period)  Outcome: Ongoing (interventions implemented as appropriate)    12/19/17 1008   Perioperative Period   Problems Assessed (Perioperative Period) pain;hypothermia;hypoxia/hypoxemia;infection;situational response   Problems Present (Perioperative Period) situational response;pain

## 2017-12-19 NOTE — OP NOTE
ANKLE ARTHROPLASTY, RECESSION GASTROCNEMIUS  Procedure Note    Riri Denise  12/19/2017    Pre-op Diagnosis:   POST TRAUMATIC ARTHRITIS, HEEL CORD CONTRACTURE, RIGHT ANKLE  * Post-traumatic arthritis of ankle, right [M19.171]     * Heel cord contracture [M67.00]     * Pain, ankle [M25.579]    Post-op Diagnosis:     Post-Op Diagnosis Codes:     * Post-traumatic arthritis of ankle, right [M19.171]     * Heel cord contracture [M67.00]     * Pain, ankle [M25.579]    Procedure/CPT® Codes:      Procedure(s):  1)  TOTAL ANKLE ARTHROPLASTY WITH INFINITY IMPLANT    2)  GASTROCNEMIUS RECESSION RIGHT ANKLE     Surgeon(s):  Herve Bojorquez DPM    Anesthesia: General with Block    Staff:   Circulator: Nik Addison RN; Silvia Fernandez RN; Mariza Bey RN  Scrub Person: Jeff Caceres; Cayden Carvalho  Vendor Representative: Duglas Merino  Assistant: Latrice Barreto    Indications for procedure:  Chronic right ankle pain.  She has exhausted conservative measures including bracing corticaosteroid injection, non-steroidal anti-inflammatory medications and shoe gear changes.    Procedure details:  The patient was brought into the operating room and placed under general anesthesia.  A preoperative regional block had been performed by anesthesia in preoperative holding area.  Right lower extremity prepped and draped in usual sterile fashion.    Procedure #1 gastrocnemius recession right    Attention was then directed to the posterior aspect patient's right leg.  A 3.5 m incision was made.  Was deepened utilizing sharp and blunt dissection technique.  A linear incision was created in the peritenon.  The foot was dorsiflexed and the fascia overlying the gastrocnemius muscle belly was lengthened and Clif fashion.  Peritenon repaired utilizing 2-0 Vicryl subcutaneous case tissues were proximal was not 2-0 Vicryl and skin is reported(s) and 3-0 nylon.    Procedure #2 total ankle arthroplasty with implant Infinity type right  ankle    Attention was then directed to the anterior ankle where approximately 13 cm linear incision was made just lateral to the tendon of tibialis anterior.  It was deepened utilizing sharp and blunt dissection technique.  The extensor retinaculum was then incised tibialis anterior tendon retracted medially neurovascular structures and extensor tendons retracted laterally.  A linear capsular periosteal incision was then made.  The ankle joint was then exposed.  The tibial alignment guide was then placed temporally fixated.  Fluoroscopic examination was performed.  The alignment guide was then removed and the cut block was placed.  Tibial cuts were then performed.  Anterior portion of the tibial cut was then removed the tibial talar alignment guide was then placed.  Was fixated.  Fluoroscopic examination was performed.  The talar cut was then performed.  Attention was then directed to the posterior ankle where the remainder of the tibial bone was removed.  Size 3 tibial tray trial was then introduced.  The review does create peg drills in the tibia.  The polyethylene talar trial was then introduced through the tibiotalar trial temporally fixated.  The talar cut guide was then placed.  Posterior talar cut and anterior chamfer cuts were performed.  Wound was then irrigated.  A size 2 talus was then tamped into place.  6 mm poly-spacer was tamped into place.  Medial release the deep deltoid ligament was performed.  Wound was then irrigated.  Fluoroscopic examination was performed.    Closure Performed in layers.  A well-padded posterior and sugar tong type splint applied.                Estimated Blood Loss: none    Specimens:                None      Drains:   Ureteral Catheter 12/19/17 1150 (Active)           Implants:   Implant Name Type Inv. Item Serial No.  Lot No. LRB No. Used   DOME TALAR INFINITY SZ2 - [id number] Implant DOME TALAR INFINITY SZ2  [employer] [id number] Right 1   INFINITY TIBIAL  TRAY PLASMA SPRAY SZ 3 STD    [place] [employer] [id number] Right 1   INSRT POLY INFINITY SZ2 PLS 6MM - [id number] Implant INSRT POLY INFINITY SZ2 PLS 6MM  [employer] [id number] Right 1   ALLOGRFT ACTISHIELD AMNIO VAZQUEZ 4X8CM - [id number] Implant ALLOGRFT ACTISHIELD AMNIO VAZQUEZ 4X8CM   [employer] [id number] Right 1        Complications: none    Follow up:   Will admitted for postoperative pain control.    Herve Bojorquez DPM     Date: 12/19/2017  Time: 2:34 PM

## 2017-12-19 NOTE — CONSULTS
[facility] Medicine Consult  Inpatient Consult to Hospitalist  Consult performed by: SOCRATES RODRIGUEZ  Consult ordered by: FAINA BOJORQUEZ          Date of Admission: 12/19/2017  Date of Consult: 12/19/17    Primary Care Physician: Patricio Lema MD  Referring Physician: Onur Bojorquez MD   Chief Complaint/Reason for Consultation: Diabetes management     Subjective   History of Present Illness  The patient is a 52 year old  female who is admitted to Dr. Bojorquez for a total ankle arthroplasty that was done today.  She has a past medical history of diabetes, hypertension, arthritis, obesity, tobacco abuse and mitral valve prolapse.  She currently denies any pain but states that she is having muscle spasms.  She just took a valium for it and is hoping that it helps.  She states that Dr. Lema is her physician and has not had her hemoglobin A1c checked in quite some time.  She denies any issues or complaints currently. Blood pressure currently controlled and glucose is 104.    Review of Systems   Constitutional: Negative for activity change, appetite change, chills and fever.   HENT: Negative for hearing loss, nosebleeds, tinnitus and trouble swallowing.    Eyes: Negative for visual disturbance.   Respiratory: Negative for cough, chest tightness, shortness of breath and wheezing.    Cardiovascular: Negative for chest pain, palpitations and leg swelling.   Gastrointestinal: Negative for abdominal distention, abdominal pain, blood in stool, constipation, diarrhea, nausea and vomiting.   Endocrine: Negative for cold intolerance, heat intolerance, polydipsia, polyphagia and polyuria.   Genitourinary: Negative for decreased urine volume, difficulty urinating, dysuria, flank pain, frequency and hematuria.   Musculoskeletal: Positive for gait problem, joint swelling and myalgias. Negative for arthralgias.   Skin: Negative for rash.   Allergic/Immunologic: Negative for  immunocompromised state.   Neurological: Negative for dizziness, syncope, weakness, light-headedness and headaches.   Hematological: Negative for adenopathy. Does not bruise/bleed easily.   Psychiatric/Behavioral: Negative for confusion and sleep disturbance. The patient is not nervous/anxious.       Otherwise complete ROS is negative except as mentioned above.    Past Medical History:   Past Medical History:   Diagnosis Date   • Arthritis    • Degenerative cervical disc    • Diabetes mellitus    • Diverticula, colon    • Hypertension    • Mitral valve prolapse    • Osteopetrosis    • PONV (postoperative nausea and vomiting)     only when has demerol      Past Surgical History:  Past Surgical History:   Procedure Laterality Date   • ANKLE SURGERY Right     X2   • APPENDECTOMY     • CERVICAL FUSION     • CHOLECYSTECTOMY     • COLON RESECTION     • ENDOMETRIAL ABLATION     • FACIAL FRACTURE SURGERY     • HERNIA REPAIR      WITH MESH   • HYSTERECTOMY     • OVARIAN CYST REMOVAL      AND OVARY REMOVAL   • TONSILLECTOMY     • TUBAL ABDOMINAL LIGATION       Social History:  reports that she has been smoking Cigarettes.  She has been smoking about 1.00 pack per day. She does not have any smokeless tobacco history on file.    Family History: Hypertension, diabetes    Allergies:   Allergies   Allergen Reactions   • Adhesive Tape Hives   • Demerol [Meperidine] Rash     VOMITING   • Levemir [Insulin Detemir] Hives   • Tresiba Flextouch [Insulin Degludec] Hives     Medications: Scheduled Meds:  atorvastatin 10 mg Oral Nightly   ceFAZolin 2 g Intravenous Q8H   DULoxetine 60 mg Oral Daily   [START ON 12/20/2017] enoxaparin 40 mg Subcutaneous Daily   gabapentin 600 mg Oral Q8H   hydrochlorothiazide 12.5 mg Oral Daily   losartan 50 mg Oral Daily   vitamin D3 5,000 Units Oral Daily     Continuous Infusions:  lactated ringers 1,000 mL Last Rate: 1,000 mL (12/19/17 1513)   lactated ringers 100 mL/hr Last Rate: 100 mL/hr (12/19/17 1100)      PRN Meds:.cyclobenzaprine  •  diazePAM  •  HYDROmorphone **AND** naloxone  •  ondansetron  •  oxyCODONE-acetaminophen  •  promethazine    Objective   Objective    Physical Exam:   Temp:  [97.3 °F (36.3 °C)-98.1 °F (36.7 °C)] 97.3 °F (36.3 °C)  Heart Rate:  [] 108  Resp:  [14-20] 19  BP: (124-157)/(49-87) 139/71     Physical Exam   Constitutional: She is oriented to person, place, and time. She appears well-developed and well-nourished.   Obese   HENT:   Head: Normocephalic and atraumatic.   Eyes: Conjunctivae and EOM are normal. Pupils are equal, round, and reactive to light.   Neck: Neck supple. No JVD present. No thyromegaly present.   Cardiovascular: Normal rate, regular rhythm, normal heart sounds and intact distal pulses.  Exam reveals no gallop and no friction rub.    No murmur heard.  Pulmonary/Chest: Effort normal and breath sounds normal. No respiratory distress. She has no wheezes. She has no rales. She exhibits no tenderness.   Abdominal: Soft. Bowel sounds are normal. She exhibits no distension. There is no tenderness. There is no rebound and no guarding.   Musculoskeletal: Normal range of motion. She exhibits no edema, tenderness or deformity.   RLE in surgical cast/ace wrap    Lymphadenopathy:     She has no cervical adenopathy.   Neurological: She is alert and oriented to person, place, and time. She displays normal reflexes. No cranial nerve deficit. She exhibits normal muscle tone.   Skin: Skin is warm and dry. No rash noted.   Psychiatric: She has a normal mood and affect. Her behavior is normal. Judgment and thought content normal.   Vitals reviewed.    Results Reviewed:  I have personally reviewed current lab, radiology, and data and agree with results.  Lab Results (last 24 hours)     Procedure Component Value Units Date/Time    POC Glucose Once [[id number]]  (Normal) Collected:  12/19/17 0940    Specimen:  Blood Updated:  12/19/17 0944     Glucose 120 mg/dL       : [id number]  Emilia Coker ID: [id number]       POC Glucose Once [[id number]]  (Normal) Collected:  12/19/17 1437    Specimen:  Blood Updated:  12/19/17 1454     Glucose 104 mg/dL       : [id number] Morris JewellMeter ID: [id number]           Imaging Results (last 24 hours)     Procedure Component Value Units Date/Time    FL C Arm During Surgery [[id number]] Updated:  12/19/17 1435    XR Ankle 2 View Right [[id number]] Collected:  12/19/17 1438     Updated:  12/19/17 1441    Narrative:       XR ANKLE 2 VW RIGHT- 12/19/2017 11:55 AM CST     HISTORY: right total ankle     COMPARISON: None     FLUOROSCOPY TIME: 7 minutes 13 seconds     NUMBER OF IMAGES: 2       Impression:          Intraoperative fluoroscopic images during total ankle arthroplasty.     Please refer to the operative note for more details.  This report was finalized on 12/19/2017 14:38 by Dr Tad Argueta, .        Assessment / Plan   Assessment:   1. Post traumatic arthritis right ankle, status post total ankle arthroplasty with infinity implant and gastrocnemius recession   2. Diabetes mellitus, type II  3. Essential Hypertension  4. Mitral valve prolapse  5. Arthritis   6. Tobacco abuse   7. Obesity     Plan:   1. Resume home medications as ordered  2. Check Hemoglobin A1c  3. Montior blood pressure every 4 hours  4. Accu checks ac/hs with SSI  5. Nicotine patch   6. CBC and BMP in AM  7. PT/OT per Dr. Bojorquez  8. Check Lipid profile     I discussed the patients findings and my recommendations with the patient, family and Dr. Marshall.    EMMA Jimenez  12/19/17  5:05 PM    I personally evaluated and examined the patient in conjunction with EMMA Bryant and agree with the assessment, treatment plan, and disposition of the patient as recorded by her. My history, exam, and further recommendations are:     Agree.  Start SSI  Check A1C  Will check regarding Lantus with case management      Aries Marshall MD  12/20/17  6:13 PM

## 2017-12-19 NOTE — ANESTHESIA PROCEDURE NOTES
Peripheral Block    Patient location during procedure: pre-op  Start time: 12/19/2017 10:24 AM  Stop time: 12/19/2017 10:30 AM  Reason for block: procedure for pain  Performed by  Anesthesiologist: LESLIE ADORNO  Preanesthetic Checklist  Completed: patient identified, site marked, surgical consent, pre-op evaluation, timeout performed, IV checked, risks and benefits discussed and monitors and equipment checked  Prep:  Pt Position: supine  Sterile barriers:gloves  Prep: ChloraPrep  Patient monitoring: continuous pulse oximetry  Procedure  Sedation:yes    Guidance:ultrasound guided and nerve stimulator  Images:still images not obtained  Loss of twitch: 0.5 mA  Laterality:right  Block Type:adductor canal block and popliteal  Injection Technique:single-shot  Needle Type:echogenic  Needle Gauge:20 G  Resistance on Injection: none  Medications  Local Injected:ropivacaine 0.5% Local Amount Injected:30mL  Post Assessment  Injection Assessment: negative aspiration for heme, no paresthesia on injection and incremental injection  Patient Tolerance:comfortable throughout block  Complications:no

## 2017-12-19 NOTE — ANESTHESIA PREPROCEDURE EVALUATION
Anesthesia Evaluation     Patient summary reviewed   history of anesthetic complications: PONV  NPO Solid Status: > 8 hours  NPO Liquid Status: > 8 hours     Airway   Mallampati: III  TM distance: >3 FB  Neck ROM: full  Dental          Pulmonary - normal exam    breath sounds clear to auscultation  (+) a smoker (ppd) Current,   (-) asthma, recent URI, sleep apnea  Cardiovascular - normal exam  Exercise tolerance: good (4-7 METS)    ECG reviewed  Rhythm: regular  Rate: normal    (+) hypertension well controlled,   (-) pacemaker, past MI, angina, cardiac stents      Neuro/Psych  (-) seizures, TIA, CVA  GI/Hepatic/Renal/Endo    (+)  GERD well controlled, diabetes mellitus,   (-) liver disease, no renal disease, hypothyroidism, hyperthyroidism    Musculoskeletal     Abdominal    Substance History      OB/GYN          Other        ROS/Med Hx Other: Denies any numbness or tingling in lower extremities                            Anesthesia Plan    ASA 2     general and regional     intravenous induction   Anesthetic plan and risks discussed with patient.

## 2017-12-19 NOTE — ANESTHESIA PROCEDURE NOTES
Airway  Urgency: elective    Airway not difficult    General Information and Staff    Patient location during procedure: OR  CRNA: RENZO HEBERT    Indications and Patient Condition  Indications for airway management: airway protection    Preoxygenated: yes  MILS maintained throughout  Mask difficulty assessment: 1 - vent by mask    Final Airway Details  Final airway type: endotracheal airway      Successful airway: ETT  Cuffed: yes   Successful intubation technique: direct laryngoscopy  Facilitating devices/methods: intubating stylet  Endotracheal tube insertion site: oral  Blade: Pereira  Blade size: #2  ETT size: 7.5 mm  Cormack-Lehane Classification: grade I - full view of glottis  Placement verified by: chest auscultation, capnometry and palpation of cuff   Cuff volume (mL): 8  Measured from: teeth  ETT to teeth (cm): 21  Number of attempts at approach: 1

## 2017-12-19 NOTE — PLAN OF CARE
Problem: Patient Care Overview (Adult)  Goal: Plan of Care Review  Outcome: Ongoing (interventions implemented as appropriate)    12/19/17 1529   Coping/Psychosocial Response Interventions   Plan Of Care Reviewed With patient   Patient Care Overview   Progress progress toward functional goals as expected   Outcome Evaluation   Outcome Summary/Follow up Plan Meets discharge criteria         Problem: Perioperative Period (Adult)  Goal: Signs and Symptoms of Listed Potential Problems Will be Absent or Manageable (Perioperative Period)  Outcome: Ongoing (interventions implemented as appropriate)

## 2017-12-19 NOTE — H&P
Patient Care Team:  Patricio Lema MD as PCP - General (Family Medicine)    Chief complaint Chronic ankle pain    Subjective     History of Present Illness    Review of Systems   Constitutional: Negative for activity change, appetite change, chills, diaphoresis, fatigue, fever and unexpected weight change.   HENT: Negative for mouth sores and sore throat.    Eyes: Negative for visual disturbance.   Respiratory: Negative for apnea, cough, choking, chest tightness, shortness of breath, wheezing and stridor.    Cardiovascular: Negative for chest pain, palpitations and leg swelling.   Gastrointestinal: Negative for abdominal pain, blood in stool, constipation, diarrhea, nausea and vomiting.   Endocrine: Negative for cold intolerance, heat intolerance, polydipsia, polyphagia and polyuria.   Genitourinary: Negative for difficulty urinating.   Musculoskeletal: Negative for arthralgias, back pain, gait problem, joint swelling, myalgias, neck pain and neck stiffness.   Skin: Negative for color change, pallor, rash and wound.   Allergic/Immunologic: Negative for environmental allergies, food allergies and immunocompromised state.   Neurological: Negative for dizziness, weakness, light-headedness, numbness and headaches.   Hematological: Does not bruise/bleed easily.   Psychiatric/Behavioral: Negative for agitation, behavioral problems, confusion, hallucinations, self-injury and sleep disturbance. The patient is not nervous/anxious.           Objective      Vital Signs  Temp:  [97.4 °F (36.3 °C)] 97.4 °F (36.3 °C)  Heart Rate:  [77-89] 89  Resp:  [14-16] 14  BP: (126-150)/(49-73) 126/49    Physical Exam   Constitutional: She is oriented to person, place, and time. She appears well-developed and well-nourished.   HENT:   Head: Normocephalic and atraumatic.   Nose: Nose normal.   Eyes: EOM are normal. Pupils are equal, round, and reactive to light.   Neck: Normal range of motion. Neck supple.   Cardiovascular: Normal  rate, regular rhythm and intact distal pulses.    Pulmonary/Chest: Effort normal and breath sounds normal.   Abdominal: Soft.   Musculoskeletal: Normal range of motion.   Neurological: She is alert and oriented to person, place, and time.   Skin: Skin is warm and dry.   Psychiatric: She has a normal mood and affect. Her behavior is normal. Judgment and thought content normal.   Vitals reviewed.      Results Review:         Assessment/Plan     Active Problems:    * No active hospital problems. *  post traumatic arthritis right ankle    Assessment & Plan    I discussed the patients findings and my recommendations with the patient today.  We discussed total ankle arthroplasty with implant in detail.  Discussed all ricks and benefits.    Herve Bojorquez DPM  12/19/17  11:35 AM

## 2017-12-20 LAB
ANION GAP SERPL CALCULATED.3IONS-SCNC: 11 MMOL/L (ref 4–13)
BUN BLD-MCNC: 13 MG/DL (ref 5–21)
BUN/CREAT SERPL: 19.1 (ref 7–25)
CALCIUM SPEC-SCNC: 9.1 MG/DL (ref 8.4–10.4)
CHLORIDE SERPL-SCNC: 97 MMOL/L (ref 98–110)
CO2 SERPL-SCNC: 31 MMOL/L (ref 24–31)
CREAT BLD-MCNC: 0.68 MG/DL (ref 0.5–1.4)
DEPRECATED RDW RBC AUTO: 42 FL (ref 40–54)
ERYTHROCYTE [DISTWIDTH] IN BLOOD BY AUTOMATED COUNT: 12.7 % (ref 12–15)
GFR SERPL CREATININE-BSD FRML MDRD: 91 ML/MIN/1.73
GLUCOSE BLD-MCNC: 169 MG/DL (ref 70–100)
GLUCOSE BLDC GLUCOMTR-MCNC: 139 MG/DL (ref 70–130)
GLUCOSE BLDC GLUCOMTR-MCNC: 156 MG/DL (ref 70–130)
GLUCOSE BLDC GLUCOMTR-MCNC: 181 MG/DL (ref 70–130)
GLUCOSE BLDC GLUCOMTR-MCNC: 198 MG/DL (ref 70–130)
HCT VFR BLD AUTO: 39 % (ref 37–47)
HGB BLD-MCNC: 12.8 G/DL (ref 12–16)
MCH RBC QN AUTO: 29.5 PG (ref 28–32)
MCHC RBC AUTO-ENTMCNC: 32.8 G/DL (ref 33–36)
MCV RBC AUTO: 89.9 FL (ref 82–98)
PLATELET # BLD AUTO: 241 10*3/MM3 (ref 130–400)
PMV BLD AUTO: 9.3 FL (ref 6–12)
POTASSIUM BLD-SCNC: 4.1 MMOL/L (ref 3.5–5.3)
RBC # BLD AUTO: 4.34 10*6/MM3 (ref 4.2–5.4)
SODIUM BLD-SCNC: 139 MMOL/L (ref 135–145)
WBC NRBC COR # BLD: 11.7 10*3/MM3 (ref 4.8–10.8)

## 2017-12-20 PROCEDURE — 80048 BASIC METABOLIC PNL TOTAL CA: CPT | Performed by: NURSE PRACTITIONER

## 2017-12-20 PROCEDURE — 25010000003 CEFAZOLIN IN DEXTROSE 2-4 GM/100ML-% SOLUTION: Performed by: PODIATRIST

## 2017-12-20 PROCEDURE — 85027 COMPLETE CBC AUTOMATED: CPT | Performed by: NURSE PRACTITIONER

## 2017-12-20 PROCEDURE — 25010000002 HYDROMORPHONE PER 4 MG: Performed by: PODIATRIST

## 2017-12-20 PROCEDURE — G8978 MOBILITY CURRENT STATUS: HCPCS

## 2017-12-20 PROCEDURE — 82962 GLUCOSE BLOOD TEST: CPT

## 2017-12-20 PROCEDURE — 25010000002 CEFAZOLIN PER 500 MG: Performed by: PODIATRIST

## 2017-12-20 PROCEDURE — 97161 PT EVAL LOW COMPLEX 20 MIN: CPT

## 2017-12-20 PROCEDURE — G8979 MOBILITY GOAL STATUS: HCPCS

## 2017-12-20 PROCEDURE — 97116 GAIT TRAINING THERAPY: CPT

## 2017-12-20 PROCEDURE — 63710000001 INSULIN LISPRO (HUMAN) PER 5 UNITS: Performed by: NURSE PRACTITIONER

## 2017-12-20 PROCEDURE — 25010000002 ENOXAPARIN PER 10 MG: Performed by: PODIATRIST

## 2017-12-20 RX ADMIN — ENOXAPARIN SODIUM 40 MG: 40 INJECTION SUBCUTANEOUS at 09:12

## 2017-12-20 RX ADMIN — HYDROCHLOROTHIAZIDE 12.5 MG: 25 TABLET ORAL at 11:36

## 2017-12-20 RX ADMIN — OXYCODONE HYDROCHLORIDE AND ACETAMINOPHEN 1 TABLET: 10; 325 TABLET ORAL at 11:36

## 2017-12-20 RX ADMIN — CHOLECALCIFEROL CAP 125 MCG (5000 UNIT) 5000 UNITS: 125 CAP at 11:35

## 2017-12-20 RX ADMIN — OXYCODONE HYDROCHLORIDE AND ACETAMINOPHEN 1 TABLET: 10; 325 TABLET ORAL at 22:01

## 2017-12-20 RX ADMIN — DIAZEPAM 5 MG: 5 TABLET ORAL at 12:07

## 2017-12-20 RX ADMIN — HYDROMORPHONE HYDROCHLORIDE 0.5 MG: 2 INJECTION, SOLUTION INTRAMUSCULAR; INTRAVENOUS; SUBCUTANEOUS at 01:05

## 2017-12-20 RX ADMIN — GABAPENTIN 600 MG: 300 CAPSULE ORAL at 21:11

## 2017-12-20 RX ADMIN — DULOXETINE HYDROCHLORIDE 60 MG: 30 CAPSULE, DELAYED RELEASE ORAL at 11:35

## 2017-12-20 RX ADMIN — OXYCODONE HYDROCHLORIDE AND ACETAMINOPHEN 1 TABLET: 10; 325 TABLET ORAL at 15:40

## 2017-12-20 RX ADMIN — NICOTINE 1 PATCH: 21 PATCH, EXTENDED RELEASE TRANSDERMAL at 21:12

## 2017-12-20 RX ADMIN — INSULIN LISPRO 2 UNITS: 100 INJECTION, SOLUTION INTRAVENOUS; SUBCUTANEOUS at 09:12

## 2017-12-20 RX ADMIN — CEFAZOLIN SODIUM 2 G: 2 INJECTION, SOLUTION INTRAVENOUS at 15:40

## 2017-12-20 RX ADMIN — DIAZEPAM 5 MG: 5 TABLET ORAL at 01:05

## 2017-12-20 RX ADMIN — LOSARTAN POTASSIUM 50 MG: 50 TABLET ORAL at 11:35

## 2017-12-20 RX ADMIN — GABAPENTIN 600 MG: 300 CAPSULE ORAL at 15:40

## 2017-12-20 RX ADMIN — OXYCODONE HYDROCHLORIDE AND ACETAMINOPHEN 1 TABLET: 10; 325 TABLET ORAL at 06:48

## 2017-12-20 RX ADMIN — CEFAZOLIN SODIUM 2 G: 2 INJECTION, SOLUTION INTRAVENOUS at 05:35

## 2017-12-20 RX ADMIN — CYCLOBENZAPRINE HYDROCHLORIDE 10 MG: 10 TABLET, FILM COATED ORAL at 16:38

## 2017-12-20 RX ADMIN — CYCLOBENZAPRINE HYDROCHLORIDE 10 MG: 10 TABLET, FILM COATED ORAL at 09:12

## 2017-12-20 RX ADMIN — GABAPENTIN 600 MG: 300 CAPSULE ORAL at 05:34

## 2017-12-20 RX ADMIN — HYDROMORPHONE HYDROCHLORIDE 0.5 MG: 2 INJECTION, SOLUTION INTRAMUSCULAR; INTRAVENOUS; SUBCUTANEOUS at 16:38

## 2017-12-20 RX ADMIN — INSULIN LISPRO 2 UNITS: 100 INJECTION, SOLUTION INTRAVENOUS; SUBCUTANEOUS at 21:11

## 2017-12-20 RX ADMIN — ATORVASTATIN CALCIUM 10 MG: 10 TABLET, FILM COATED ORAL at 22:01

## 2017-12-20 RX ADMIN — DIAZEPAM 5 MG: 5 TABLET ORAL at 22:01

## 2017-12-20 NOTE — PLAN OF CARE
Problem: Patient Care Overview (Adult)  Goal: Plan of Care Review  Outcome: Ongoing (interventions implemented as appropriate)   12/20/17 6055   Coping/Psychosocial Response Interventions   Plan Of Care Reviewed With patient   Patient Care Overview   Progress improving   Outcome Evaluation   Outcome Summary/Follow up Plan Pt ambulated with PT and assist of walker in hallway, required assist x1, pain has been managed with PRN PO and IV medication, pt denies nausea. Pt voiding adequately after chen catheter removal. Family member x1 at the bedside the majority of the day, neither have any further questions or complaints at this time, will continue to monitor.      Goal: Adult Individualization and Mutuality  Outcome: Ongoing (interventions implemented as appropriate)    Goal: Discharge Needs Assessment  Outcome: Ongoing (interventions implemented as appropriate)      Problem: Perioperative Period (Adult)  Goal: Signs and Symptoms of Listed Potential Problems Will be Absent or Manageable (Perioperative Period)  Outcome: Ongoing (interventions implemented as appropriate)      Problem: Fall Risk (Adult)  Goal: Absence of Falls  Outcome: Ongoing (interventions implemented as appropriate)

## 2017-12-20 NOTE — THERAPY EVALUATION
Acute Care - Physical Therapy Initial Evaluation  [facility]     Patient Name: Riri Denise  : 1965  MRN: [id number]  Today's Date: 2017   Onset of Illness/Injury or Date of Surgery Date: 17  Date of Referral to PT: 17  Referring Physician: Dr. Bojorquez      Admit Date: 2017     Visit Dx:    ICD-10-CM ICD-9-CM   1. Impaired mobility Z74.09 799.89     Patient Active Problem List   Diagnosis   • Post-traumatic arthritis of ankle, right     Past Medical History:   Diagnosis Date   • Arthritis    • Degenerative cervical disc    • Diabetes mellitus    • Diverticula, colon    • Hypertension    • Mitral valve prolapse    • Osteopetrosis    • PONV (postoperative nausea and vomiting)     only when has demerol      Past Surgical History:   Procedure Laterality Date   • ANKLE SURGERY Right     X2   • APPENDECTOMY     • CERVICAL FUSION     • CHOLECYSTECTOMY     • COLON RESECTION     • ENDOMETRIAL ABLATION     • FACIAL FRACTURE SURGERY     • HERNIA REPAIR      WITH MESH   • HYSTERECTOMY     • OVARIAN CYST REMOVAL      AND OVARY REMOVAL   • TONSILLECTOMY     • TUBAL ABDOMINAL LIGATION            PT ASSESSMENT (last 72 hours)      PT Evaluation       17 1058 17 1848    Rehab Evaluation    Document Type evaluation  -     Subjective Information agree to therapy;complains of;pain;nausea/vomiting;dyspnea;numbness   n/t R lower leg  -     General Information    Patient Profile Review yes  -LH     Onset of Illness/Injury or Date of Surgery Date 17  -     Referring Physician Dr. Bojorquez  -     General Observations awake, alert, fowlers in bed  -     Pertinent History Of Current Problem R ankle pain, Sx:  total ankle arthroplasty, gastroc recession 17  -     Precautions/Limitations fall precautions;non-weight bearing status   NWB R ankle  -     Prior Level of Function independent:;all household mobility;community mobility;ADL's;driving;shopping;cooking;cleaning  -      Equipment Currently Used at Home cane, straight  -     Plans/Goals Discussed With patient;agreed upon  -     Risks Reviewed patient:;LOB;nausea/vomiting;dizziness;increased discomfort  -     Benefits Reviewed patient:;improve function;increase independence;increase strength;increase balance  -     Barriers to Rehab none identified  -     Living Environment    Lives With spouse  - spouse  -    Living Arrangements house  - house  -    Home Accessibility stairs to enter home;bed and bath on same level;tub/shower is not walk in  - no concerns  -AA    Number of Stairs to Enter Home 2  -     Stair Railings at Home none   pole on L to use per pt  - none  -AA    Type of Financial/Environmental Concern  none  -AA    Transportation Available  car;family or friend will provide  -AA    Clinical Impression    Date of Referral to PT 12/19/17  -     PT Diagnosis impaired mobility  -     Patient/Family Goals Statement return home  -     Criteria for Skilled Therapeutic Interventions Met yes;treatment indicated  -     Rehab Potential good, to achieve stated therapy goals  -     Predicted Duration of Therapy Intervention (days/wks) until d/c  -     Pain Assessment    Pain Assessment 0-10  -     Pain Score 7  -     Pain Type Acute pain;Surgical pain  -     Pain Descriptors Spasm;Throbbing  -     Pain Frequency Constant/continuous  -     Pain Intervention(s) Medication (See MAR);Repositioned;Ambulation/increased activity  -     Response to Interventions tolerated  -     Cognitive Assessment/Intervention    Current Cognitive/Communication Assessment functional  -     Orientation Status oriented x 4  -     ROM (Range of Motion)    General ROM no range of motion deficits identified   R ankle n/a  -LH     MMT (Manual Muscle Testing)    General MMT Assessment no strength deficits identified   R ankle n/a  -LH     Mobility Assessment/Training    Extremity Weight-Bearing Status right lower  extremity  -LH     Right Lower Extremity Weight-Bearing non weight-bearing  -     Bed Mobility, Assessment/Treatment    Bed Mobility, Assistive Device head of bed elevated  -     Bed Mob, Supine to Sit, [place] independent  -     Bed Mob, Sit to Supine, [place] independent  -     Transfer Assessment/Treatment    Transfers, Sit-Stand [place] contact guard assist  -     Transfers, Stand-Sit [place] contact guard assist  -     Gait Assessment/Treatment    Gait, [place] Level verbal cues required;contact guard assist  -     Gait, Assistive Device rolling walker  -     Gait, Distance (Feet) 40  -     Gait, Maintain Weight Bearing Status able to maintain weight bearing status  -     Positioning and Restraints    Pre-Treatment Position in bed  -     Post Treatment Position chair  -     In Chair sitting;call light within reach;encouraged to call for assist;with family/caregiver;legs elevated  -       12/19/17 1800       General Information    Equipment Currently Used at Home cane, straight  -AA       User Key  (r) = Recorded By, (t) = Taken By, (c) = Cosigned By    Initials Name Provider Type     Marc Mckenzie, PT Physical Therapist    CLARISSE Bojorquez, RN Registered Nurse          Physical Therapy Education     Title: PT OT SLP Therapies (Done)     Topic: Physical Therapy (Done)     Point: Mobility training (Done)    Learning Progress Summary    Learner Readiness Method Response Comment Documented by Status   Patient Acceptance AMRITA LEHMAN DU benefits of PT and POC, call for assist w/ mobility, discussed options for entering home up 2 steps  12/20/17 1157 Done   Caregiver Acceptance AMRITA LEHMAN DU benefits of PT and POC, call for assist w/ mobility, discussed options for entering home up 2 steps  12/20/17 1157 Done               Point: Precautions (Done)    Learning Progress Summary    Learner Readiness Method Response Comment Documented by Status   Patient Acceptance AMRITA LEHMAN DU  benefits of PT and POC, call for assist w/ mobility, discussed options for entering home up 2 steps  12/20/17 1157 Done   Caregiver Acceptance AMRITA LEHMAN DU benefits of PT and POC, call for assist w/ mobility, discussed options for entering home up 2 steps  12/20/17 1157 Done                      User Key     Initials Effective Dates Name Provider Type Discipline     08/02/16 -  Marc Mckenzie, PT Physical Therapist PT                PT Recommendation and Plan  Anticipated Equipment Needs At Discharge: rolling knee walker, front wheeled walker, tub bench  Anticipated Discharge Disposition: home with assist  Planned Therapy Interventions: gait training, patient/family education, transfer training, stair training  PT Frequency: 2 times/day, per priority policy  Plan of Care Review  Plan Of Care Reviewed With: patient  Outcome Summary/Follow up Plan: PT IE complete.  Pt NWB R ankle w/ RW 40 feet cga x1.  Pt to receive skilled PT to address gait safety NWB R ankle w/ RW.  Trial knee walker.  Recommend home w/ assist at D/C.  Thank you for referral.          IP PT Goals       12/20/17 1201          Transfer Training PT LTG    Transfer Training PT LTG, Date Established 12/20/17  -      Transfer Training PT LTG, Time to Achieve by discharge  -      Transfer Training PT LTG, Activity Type bed to chair /chair to bed;sit to stand/stand to sit  -      Transfer Training PT LTG, [place] Level supervision required  -      Transfer Training PT LTG, Assist Device walker, rolling  -LH      Transfer Training PT LTG, Additional Goal NWB R ankle  -LH      Gait Training PT LTG    Gait Training Goal PT LTG, Date Established 12/20/17  -      Gait Training Goal PT LTG, Time to Achieve by discharge  -      Gait Training Goal PT LTG, [place] Level supervision required  -      Gait Training Goal PT LTG, Assist Device walker, rolling  -LH      Gait Training Goal PT LTG, Distance to Achieve 50  -LH      Gait Training  Goal PT LTG, Additional Goal may trial knee walker  -      Stair Training PT LTG    Stair Training Goal PT LTG, Date Established 12/20/17  -      Stair Training Goal PT LTG, Time to Achieve by discharge  -      Stair Training Goal PT LTG, Number of Steps 2  -      Stair Training Goal PT LTG, [place] Level contact guard assist  -      Stair Training Goal PT LTG, Assist Device 1 handrail;other (see comments)  -        User Key  (r) = Recorded By, (t) = Taken By, (c) = Cosigned By    Initials Name Provider Type     Marc Mckenzie PT Physical Therapist                Outcome Measures       12/20/17 1156          How much help from another person do you currently need...    Turning from your back to your side while in flat bed without using bedrails? 4  -LH      Moving from lying on back to sitting on the side of a flat bed without bedrails? 4  -LH      Moving to and from a bed to a chair (including a wheelchair)? 3  -LH      Standing up from a chair using your arms (e.g., wheelchair, bedside chair)? 3  -LH      Climbing 3-5 steps with a railing? 2  -LH      To walk in hospital room? 3  -      AM-PAC 6 Clicks Score 19  -      Functional Assessment    Outcome Measure Options AM-PAC 6 Clicks Basic Mobility (PT)  -        User Key  (r) = Recorded By, (t) = Taken By, (c) = Cosigned By    Initials Name Provider Type     Marc Mckenzie PT Physical Therapist           Time Calculation:         PT Charges       12/20/17 1156          Time Calculation    Start Time 1058  -      Stop Time 1140  -      Time Calculation (min) 42 min  -      PT Received On 12/20/17  -      PT Goal Re-Cert Due Date 12/30/17  -        User Key  (r) = Recorded By, (t) = Taken By, (c) = Cosigned By    Initials Name Provider Type     Marc Mckenzie PT Physical Therapist          Therapy Charges for Today     Code Description Service Date Service Provider Modifiers Qty    79080215603  PT MOBILITY CURRENT 12/20/2017  Marc Mckenzie, PT GP, CJ 1    67939789089 HC PT MOBILITY PROJECTED 12/20/2017 Marc Mckenzie, PT GP, CI 1    [id number] HC PT EVAL LOW COMPLEXITY 3 12/20/2017 Marc Mckenzie, PT GP 1          PT G-Codes  Outcome Measure Options: AM-PAC 6 Clicks Basic Mobility (PT)  Score: 19  Functional Limitation: Mobility: Walking and moving around  Mobility: Walking and Moving Around Current Status (): At least 20 percent but less than 40 percent impaired, limited or restricted  Mobility: Walking and Moving Around Goal Status (): At least 1 percent but less than 20 percent impaired, limited or restricted      Marc Mckenzie, PT  12/20/2017

## 2017-12-20 NOTE — PLAN OF CARE
Problem: Patient Care Overview (Adult)  Goal: Plan of Care Review  Outcome: Ongoing (interventions implemented as appropriate)   12/20/17 0426   Coping/Psychosocial Response Interventions   Plan Of Care Reviewed With patient   Patient Care Overview   Progress no change   Outcome Evaluation   Outcome Summary/Follow up Plan Pt. c/o pain/spasms mostly in her hips. Feeling slowly returning to right leg/foot. Toes warm and cap refill WNL. Pt. asked if it was okay for her to get up to the bathroom with family. Informed her that, a nurse or aide would need to be with her and we would need to use a BSC tonight since she was NWB on her RLE. Pt. agreed that she was NWB, then informed me that her  and daughter had helped her to the bathroom earlier to clean up after having a BM. Advised/educated against this. Pt. verbalized understanding. FC to be DCd this am. Pt. can be restless/excitable. Slept after receiving Valium. BENEDICT hose on left leg     Goal: Adult Individualization and Mutuality  Outcome: Ongoing (interventions implemented as appropriate)      Problem: Perioperative Period (Adult)  Goal: Signs and Symptoms of Listed Potential Problems Will be Absent or Manageable (Perioperative Period)  Outcome: Ongoing (interventions implemented as appropriate)      Problem: Fall Risk (Adult)  Goal: Identify Related Risk Factors and Signs and Symptoms  Outcome: Outcome(s) achieved Date Met: 12/20/17    Goal: Absence of Falls  Outcome: Ongoing (interventions implemented as appropriate)

## 2017-12-20 NOTE — PLAN OF CARE
Problem: Patient Care Overview (Adult)  Goal: Plan of Care Review  Outcome: Ongoing (interventions implemented as appropriate)   12/20/17 1201   Coping/Psychosocial Response Interventions   Plan Of Care Reviewed With patient   Outcome Evaluation   Outcome Summary/Follow up Plan PT IE complete. Pt NWB R ankle w/ RW 40 feet cga x1. Pt to receive skilled PT to address gait safety NWB R ankle w/ RW. Trial knee walker. Recommend home w/ assist at D/C. Thank you for referral.       Problem: Inpatient Physical Therapy  Goal: Transfer Training Goal 1 LTG- PT  Outcome: Ongoing (interventions implemented as appropriate)   12/20/17 1201   Transfer Training PT LTG   Transfer Training PT LTG, Date Established 12/20/17   Transfer Training PT LTG, Time to Achieve by discharge   Transfer Training PT LTG, Activity Type bed to chair /chair to bed;sit to stand/stand to sit   Transfer Training PT LTG, [place] Level supervision required   Transfer Training PT LTG, Assist Device walker, rolling   Transfer Training PT LTG, Additional Goal NWB R ankle     Goal: Gait Training Goal LTG- PT  Outcome: Ongoing (interventions implemented as appropriate)   12/20/17 1201   Gait Training PT LTG   Gait Training Goal PT LTG, Date Established 12/20/17   Gait Training Goal PT LTG, Time to Achieve by discharge   Gait Training Goal PT LTG, [place] Level supervision required   Gait Training Goal PT LTG, Assist Device walker, rolling   Gait Training Goal PT LTG, Distance to Achieve 50; NWB R ankle   Gait Training Goal PT LTG, Additional Goal may trial knee walker     Goal: Stair Training Goal LTG- PT  Outcome: Ongoing (interventions implemented as appropriate)   12/20/17 1201   Stair Training PT LTG   Stair Training Goal PT LTG, Date Established 12/20/17   Stair Training Goal PT LTG, Time to Achieve by discharge   Stair Training Goal PT LTG, Number of Steps 2   Stair Training Goal PT LTG, [place] Level contact guard assist   Stair Training  Goal PT LTG, Assist Device 1 handrail;other (see comments)

## 2017-12-20 NOTE — PROGRESS NOTES
[facility] Medicine Services  INPATIENT PROGRESS NOTE    Length of Stay: 1  Date of Admission: 12/19/2017  Primary Care Physician: Patricio Lema MD    Subjective   Chief Complaint: follow up     HPI   Doing well.  She was very pleased with her A1c result she was afraid that it was going to be high. She is having less muscle spasms today.  Wants to get out of the bed.     Review of Systems   Constitutional: Negative for activity change, appetite change, chills and fever.   HENT: Negative for hearing loss, nosebleeds, tinnitus and trouble swallowing.    Eyes: Negative for visual disturbance.   Respiratory: Negative for cough, chest tightness, shortness of breath and wheezing.    Cardiovascular: Negative for chest pain, palpitations and leg swelling.   Gastrointestinal: Negative for abdominal distention, abdominal pain, blood in stool, constipation, diarrhea, nausea and vomiting.   Endocrine: Negative for cold intolerance, heat intolerance, polydipsia, polyphagia and polyuria.   Genitourinary: Negative for decreased urine volume, difficulty urinating, dysuria, flank pain, frequency and hematuria.   Musculoskeletal: Positive for gait problem, joint swelling and myalgias. Negative for arthralgias.   Skin: Negative for rash.   Allergic/Immunologic: Negative for immunocompromised state.   Neurological: Negative for dizziness, syncope, weakness, light-headedness and headaches.   Hematological: Negative for adenopathy. Does not bruise/bleed easily.   Psychiatric/Behavioral: Negative for confusion and sleep disturbance. The patient is not nervous/anxious.    All pertinent negatives and positives are as above. All other systems have been reviewed and are negative unless otherwise stated.     Objective    Temp:  [97.3 °F (36.3 °C)-98.7 °F (37.1 °C)] 98.7 °F (37.1 °C)  Heart Rate:  [] 87  Resp:  [14-20] 20  BP: (115-157)/(49-87) 118/64     Physical Exam  Constitutional: She is  oriented to person, place, and time. She appears well-developed and well-nourished.   Obese   HENT:   Head: Normocephalic and atraumatic.   Eyes: Conjunctivae and EOM are normal. Pupils are equal, round, and reactive to light.   Neck: Neck supple. No JVD present. No thyromegaly present.   Cardiovascular: Normal rate, regular rhythm, normal heart sounds and intact distal pulses.  Exam reveals no gallop and no friction rub.    No murmur heard.  Pulmonary/Chest: Effort normal and breath sounds normal. No respiratory distress. She has no wheezes. She has no rales. She exhibits no tenderness.   Abdominal: Soft. Bowel sounds are normal. She exhibits no distension. There is no tenderness. There is no rebound and no guarding.   Musculoskeletal: Normal range of motion. She exhibits no edema, tenderness or deformity.   RLE in surgical cast/ace wrap    Lymphadenopathy:     She has no cervical adenopathy.   Neurological: She is alert and oriented to person, place, and time. She displays normal reflexes. No cranial nerve deficit. She exhibits normal muscle tone.   Skin: Skin is warm and dry. No rash noted.   Psychiatric: She has a normal mood and affect. Her behavior is normal. Judgment and thought content normal.   Vitals reviewed.    Results Review:  I have reviewed the labs, radiology results, and diagnostic studies.    Laboratory Data:     Results from last 7 days  Lab Units 12/20/17  0723   WBC 10*3/mm3 11.70*   HEMOGLOBIN g/dL 12.8   HEMATOCRIT % 39.0   PLATELETS 10*3/mm3 241       Results from last 7 days  Lab Units 12/20/17  0723   SODIUM mmol/L 139   POTASSIUM mmol/L 4.1   CHLORIDE mmol/L 97*   CO2 mmol/L 31.0   BUN mg/dL 13   CREATININE mg/dL 0.68   CALCIUM mg/dL 9.1   GLUCOSE mg/dL 169*     Radiology Data:   Imaging Results (last 24 hours)     Procedure Component Value Units Date/Time    FL C Arm During Surgery [[id number]] Updated:  12/19/17 1435    XR Ankle 2 View Right [[id number]] Collected:  12/19/17 1433      Updated:  12/19/17 1441    Narrative:       XR ANKLE 2 VW RIGHT- 12/19/2017 11:55 AM CST     HISTORY: right total ankle     COMPARISON: None     FLUOROSCOPY TIME: 7 minutes 13 seconds     NUMBER OF IMAGES: 2       Impression:          Intraoperative fluoroscopic images during total ankle arthroplasty.     Please refer to the operative note for more details.  This report was finalized on 12/19/2017 14:38 by Dr Tad Argueta, .        I have reviewed the patient current medications.     Assessment/Plan     Assessment:   1. Post traumatic arthritis right ankle, status post total ankle arthroplasty with infinity implant and gastrocnemius recession   2. Diabetes mellitus, type II, A1c 6.4  3. Essential Hypertension  4. Mitral valve prolapse  5. Arthritis   6. Tobacco abuse   7. Obesity      Plan:   1. Hemoglobin A1c - 6.4  2. Glucoses: 279, 169, 156.  3. 6 units SSI received over the past 24 hours.   4. Blood pressures are well controlled.   5. Will not make any changes at this time.  Continue to monitor.     Patient is very stable with her home regimen and it has been resumed.  Will sign off- call with questions.     Discharge Planning: Per attending.     Romaine Hernandez, EMMA   12/20/17   9:58 AM

## 2017-12-20 NOTE — THERAPY TREATMENT NOTE
Acute Care - Physical Therapy Treatment Note  [facility]     Patient Name: Riri Denise  : 1965  MRN: [id number]  Today's Date: 2017  Onset of Illness/Injury or Date of Surgery Date: 17  Date of Referral to PT: 17  Referring Physician: Dr. Bojorquez    Admit Date: 2017    Visit Dx:    ICD-10-CM ICD-9-CM   1. Impaired mobility Z74.09 799.89     Patient Active Problem List   Diagnosis   • Post-traumatic arthritis of ankle, right               Adult Rehabilitation Note       17 1442 17 1358       Rehab Assessment/Intervention    Discipline physical therapy assistant  -KJ --  -KJ     Document Type therapy note (daily note)  -KJ --  -KJ     Subjective Information agree to therapy  -KJ      Patient Effort, Rehab Treatment good  -KJ      Precautions/Limitations fall precautions;non-weight bearing status   RLE  -KJ      Recorded by [KJ] Ivet Hughes PTA [KJ] Ivet Hughes PTA     Pain Assessment    Pain Assessment 0-10  -KJ      Pain Score 4  -KJ      Pain Type Acute pain;Surgical pain  -KJ      Pain Descriptors Spasm  -KJ      Pain Frequency Constant/continuous  -KJ      Pain Intervention(s) Medication (See MAR)  -KJ      Response to Interventions toelrated  -KJ      Recorded by [KJ] Ivet Hughes PTA      Bed Mobility, Assessment/Treatment    Bed Mob, Supine to Sit, [place] independent  -KJ      Bed Mob, Sit to Supine, [place] independent  -KJ      Recorded by [KJ] Ivet Hughes PTA      Transfer Assessment/Treatment    Transfers, Sit-Stand [place] supervision required  -KJ      Transfers, Stand-Sit [place] supervision required  -KJ      Recorded by [KJ] Ivet Hughes PTA      Gait Assessment/Treatment    Gait, [place] Level verbal cues required;contact guard assist  -KJ      Gait, Assistive Device rolling walker  -KJ      Gait, Distance (Feet) 85  -KJ      Gait, Maintain Weight Bearing Status able to maintain weight bearing status  -KJ       Recorded by [KJ] Ivet Hughes PTA      Positioning and Restraints    Pre-Treatment Position in bed  -KJ      Post Treatment Position bed  -KJ      Recorded by [KJ] Ivet Hughes PTA        User Key  (r) = Recorded By, (t) = Taken By, (c) = Cosigned By    Initials Name Effective Dates    KJ Ivet Hughes, MURPHY 08/02/16 -                 IP PT Goals       12/20/17 1201          Transfer Training PT LTG    Transfer Training PT LTG, Date Established 12/20/17  -      Transfer Training PT LTG, Time to Achieve by discharge  -      Transfer Training PT LTG, Activity Type bed to chair /chair to bed;sit to stand/stand to sit  -      Transfer Training PT LTG, Leon Level supervision required  -      Transfer Training PT LTG, Assist Device walker, rolling  -      Transfer Training PT LTG, Additional Goal NWB R ankle  -      Gait Training PT LTG    Gait Training Goal PT LTG, Date Established 12/20/17  -      Gait Training Goal PT LTG, Time to Achieve by discharge  -      Gait Training Goal PT LTG, [place] Level supervision required  -      Gait Training Goal PT LTG, Assist Device walker, rolling  -      Gait Training Goal PT LTG, Distance to Achieve 50  -      Gait Training Goal PT LTG, Additional Goal may trial knee walker  -      Stair Training PT LTG    Stair Training Goal PT LTG, Date Established 12/20/17  -      Stair Training Goal PT LTG, Time to Achieve by discharge  -      Stair Training Goal PT LTG, Number of Steps 2  -      Stair Training Goal PT LTG, [place] Level contact guard assist  -      Stair Training Goal PT LTG, Assist Device 1 handrail;other (see comments)  -        User Key  (r) = Recorded By, (t) = Taken By, (c) = Cosigned By    Initials Name Provider Type     Marc Mckenzie PT Physical Therapist          Physical Therapy Education     Title: PT OT SLP Therapies (Done)     Topic: Physical Therapy (Done)     Point: Mobility training (Done)    Learning  Progress Summary    Learner Readiness Method Response Comment Documented by Status   Patient Acceptance E,D VU,DU benefits of PT and POC, call for assist w/ mobility, discussed options for entering home up 2 steps  12/20/17 1157 Done   Caregiver Acceptance E,D VU,DU benefits of PT and POC, call for assist w/ mobility, discussed options for entering home up 2 steps  12/20/17 1157 Done               Point: Precautions (Done)    Learning Progress Summary    Learner Readiness Method Response Comment Documented by Status   Patient Acceptance E,D VU,DU benefits of PT and POC, call for assist w/ mobility, discussed options for entering home up 2 steps  12/20/17 1157 Done   Caregiver Acceptance E,D VU,DU benefits of PT and POC, call for assist w/ mobility, discussed options for entering home up 2 steps  12/20/17 1157 Done                      User Key     Initials Effective Dates Name Provider Type Discipline     08/02/16 -  Marc Mckenzie, PT Physical Therapist PT                    PT Recommendation and Plan  Anticipated Equipment Needs At Discharge: rolling knee walker, front wheeled walker, tub bench  Anticipated Discharge Disposition: home with assist  Planned Therapy Interventions: gait training, patient/family education, transfer training, stair training  PT Frequency: 2 times/day, per priority policy             Outcome Measures       12/20/17 1156          How much help from another person do you currently need...    Turning from your back to your side while in flat bed without using bedrails? 4  -LH      Moving from lying on back to sitting on the side of a flat bed without bedrails? 4  -LH      Moving to and from a bed to a chair (including a wheelchair)? 3  -LH      Standing up from a chair using your arms (e.g., wheelchair, bedside chair)? 3  -LH      Climbing 3-5 steps with a railing? 2  -LH      To walk in hospital room? 3  -LH      AM-PAC 6 Clicks Score 19  -      Functional Assessment    Outcome  Measure Options AM-PAC 6 Clicks Basic Mobility (PT)  -        User Key  (r) = Recorded By, (t) = Taken By, (c) = Cosigned By    Initials Name Provider Type     Marc Mckenzie, PT Physical Therapist           Time Calculation:         PT Charges       12/20/17 1442 12/20/17 1156       Time Calculation    Start Time 1442  -KJ 1058  -     Stop Time 1454  -KJ 1140  -     Time Calculation (min) 12 min  -KJ 42 min  -     PT Received On 12/20/17  -KJ 12/20/17  -     PT Goal Re-Cert Due Date 12/30/17  -KJ 12/30/17  -     Time Calculation- PT    Total Timed Code Minutes- PT 12 minute(s)  -KJ        User Key  (r) = Recorded By, (t) = Taken By, (c) = Cosigned By    Initials Name Provider Type     Marc Mckenzie, PT Physical Therapist    CHANDA Hughes PTA Physical Therapy Assistant          Therapy Charges for Today     Code Description Service Date Service Provider Modifiers Qty    [id number] HC GAIT TRAINING EA 15 MIN 12/20/2017 Ivet Hughes, PTA GP 1          PT G-Codes  Outcome Measure Options: AM-PAC 6 Clicks Basic Mobility (PT)  Score: 19  Functional Limitation: Mobility: Walking and moving around  Mobility: Walking and Moving Around Current Status (): At least 20 percent but less than 40 percent impaired, limited or restricted  Mobility: Walking and Moving Around Goal Status (): At least 1 percent but less than 20 percent impaired, limited or restricted    Ivet Hughes PTA  12/20/2017

## 2017-12-20 NOTE — PROGRESS NOTES
Orthopedic Foot/Ankle Progress Note    Subjective     Hospital Course: stable    Post-Operative Day: 1 day s/p  1)  TOTAL ANKLE ARTHROPLASTY WITH INFINITY IMPLANT    2)  GASTROCNEMIUS RECESSION RIGHT ANKLE     Patient is resting sitting up in bed. She is alert and oriented X3. She continues to have muscle spasms which are under better control.  She denies any SOB, nausea, vomiting, fever, chills, or ill feeling.  Her block has not wore off completely.      Objective     Vital signs in last 24 hours:  Temp:  [97.3 °F (36.3 °C)-98.7 °F (37.1 °C)] 98.4 °F (36.9 °C)  Heart Rate:  [] 86  Resp:  [14-22] 22  BP: (115-157)/(54-87) 136/55    Alert and oriented X3.   Respirations are even and unlabored. No cough or wheeze.   Abdomen is soft and non-tender.  Splint intact to RLE. Popliteal pulse palpable. Able to move toes. Capillary refill is less than 3 seconds.      Data Review  CBC:  Results from last 7 days  Lab Units 12/20/17  0723   WBC 10*3/mm3 11.70*   RBC 10*6/mm3 4.34   HEMOGLOBIN g/dL 12.8   HEMATOCRIT % 39.0   PLATELETS 10*3/mm3 241     Lab Results (last 24 hours)     Procedure Component Value Units Date/Time    POC Glucose Once [[id number]]  (Normal) Collected:  12/19/17 1437    Specimen:  Blood Updated:  12/19/17 1454     Glucose 104 mg/dL       : [id number] Morris KennedyhyMeter ID: [id number]       Hemoglobin A1c [[id number]] Collected:  12/19/17 1735    Specimen:  Blood Updated:  12/19/17 1800     Hemoglobin A1C 6.4 %     Narrative:       Less than 6.0           Non-Diabetic Range  6.0-7.0                 ADA Therapeutic Target  Greater than 7.0        Action Suggested    POC Glucose Once [[id number]]  (Abnormal) Collected:  12/19/17 1806    Specimen:  Blood Updated:  12/19/17 1817     Glucose 196 (H) mg/dL       : [id number] Margot FrankMeter ID: [id number]       POC Glucose Once [[id number]]  (Abnormal) Collected:  12/19/17 2158    Specimen:  Blood Updated:  12/19/17 2234     Glucose 279 (H) mg/dL        : [id number] Bobby KristaMeter ID: [id number]       CBC (No Diff) [[id number]]  (Abnormal) Collected:  12/20/17 0723    Specimen:  Blood Updated:  12/20/17 0744     WBC 11.70 (H) 10*3/mm3      RBC 4.34 10*6/mm3      Hemoglobin 12.8 g/dL      Hematocrit 39.0 %      MCV 89.9 fL      MCH 29.5 pg      MCHC 32.8 (L) g/dL      RDW 12.7 %      RDW-SD 42.0 fl      MPV 9.3 fL      Platelets 241 10*3/mm3     Basic Metabolic Panel [[id number]]  (Abnormal) Collected:  12/20/17 0723    Specimen:  Blood Updated:  12/20/17 0756     Glucose 169 (H) mg/dL      BUN 13 mg/dL      Creatinine 0.68 mg/dL      Sodium 139 mmol/L      Potassium 4.1 mmol/L      Chloride 97 (L) mmol/L      CO2 31.0 mmol/L      Calcium 9.1 mg/dL      eGFR Non African Amer 91 mL/min/1.73      BUN/Creatinine Ratio 19.1     Anion Gap 11.0 mmol/L     Narrative:       GFR Normal >60  Chronic Kidney Disease <60  Kidney Failure <15    POC Glucose Once [[id number]]  (Abnormal) Collected:  12/20/17 0840    Specimen:  Blood Updated:  12/20/17 0852     Glucose 156 (H) mg/dL       : [id number] Brandon Seay CMeter ID: [id number]       POC Glucose Once [[id number]]  (Abnormal) Collected:  12/20/17 1208    Specimen:  Blood Updated:  12/20/17 1219     Glucose 198 (H) mg/dL       : [id number] Brandon Seay CMeter ID: [id number]             Assessment/Plan      Discussed condition with patient. She is taking Flexeril and PRN Valium for muscle spasms. She states that the muscle spasms are somewhat better. Her pain is controlled.  Her block has not wore off completely to the RLE, she is able to move her toes.    She will continue Lovenox for DVT prophylaxis.  Continue current pain regimen.  Continue medications for muscle spasms.  Continue PT.    Discussed possible discharge tomorrow depending on pain control. Patient is agreeable.     LOS: 1 day     Miranda Navas, APRN    Date: 12/20/2017  Time: 12:42 PM

## 2017-12-21 VITALS
HEIGHT: 63 IN | DIASTOLIC BLOOD PRESSURE: 74 MMHG | WEIGHT: 226 LBS | TEMPERATURE: 98.3 F | RESPIRATION RATE: 20 BRPM | BODY MASS INDEX: 40.04 KG/M2 | HEART RATE: 95 BPM | SYSTOLIC BLOOD PRESSURE: 146 MMHG | OXYGEN SATURATION: 95 %

## 2017-12-21 LAB — GLUCOSE BLDC GLUCOMTR-MCNC: 141 MG/DL (ref 70–130)

## 2017-12-21 PROCEDURE — 25010000002 ENOXAPARIN PER 10 MG: Performed by: PODIATRIST

## 2017-12-21 PROCEDURE — 97530 THERAPEUTIC ACTIVITIES: CPT | Performed by: PHYSICAL THERAPIST

## 2017-12-21 PROCEDURE — G8979 MOBILITY GOAL STATUS: HCPCS | Performed by: PHYSICAL THERAPIST

## 2017-12-21 PROCEDURE — G8980 MOBILITY D/C STATUS: HCPCS | Performed by: PHYSICAL THERAPIST

## 2017-12-21 PROCEDURE — 25010000002 HYDROMORPHONE PER 4 MG: Performed by: PODIATRIST

## 2017-12-21 PROCEDURE — G8978 MOBILITY CURRENT STATUS: HCPCS | Performed by: PHYSICAL THERAPIST

## 2017-12-21 PROCEDURE — 82962 GLUCOSE BLOOD TEST: CPT

## 2017-12-21 RX ORDER — DIAZEPAM 5 MG/1
5 TABLET ORAL EVERY 8 HOURS PRN
Qty: 30 TABLET | Refills: 0 | Status: SHIPPED | OUTPATIENT
Start: 2017-12-21 | End: 2019-08-05 | Stop reason: ALTCHOICE

## 2017-12-21 RX ORDER — OXYCODONE AND ACETAMINOPHEN 10; 325 MG/1; MG/1
1 TABLET ORAL EVERY 4 HOURS PRN
Qty: 42 TABLET | Refills: 0 | Status: SHIPPED | OUTPATIENT
Start: 2017-12-21 | End: 2017-12-28

## 2017-12-21 RX ORDER — PROMETHAZINE HYDROCHLORIDE 12.5 MG/1
12.5 TABLET ORAL EVERY 4 HOURS PRN
Qty: 60 TABLET | Refills: 0 | Status: SHIPPED | OUTPATIENT
Start: 2017-12-21 | End: 2019-08-05

## 2017-12-21 RX ADMIN — HYDROMORPHONE HYDROCHLORIDE 0.5 MG: 2 INJECTION, SOLUTION INTRAMUSCULAR; INTRAVENOUS; SUBCUTANEOUS at 00:40

## 2017-12-21 RX ADMIN — DULOXETINE HYDROCHLORIDE 60 MG: 30 CAPSULE, DELAYED RELEASE ORAL at 10:11

## 2017-12-21 RX ADMIN — DIAZEPAM 5 MG: 5 TABLET ORAL at 05:51

## 2017-12-21 RX ADMIN — CHOLECALCIFEROL CAP 125 MCG (5000 UNIT) 5000 UNITS: 125 CAP at 10:10

## 2017-12-21 RX ADMIN — ENOXAPARIN SODIUM 40 MG: 40 INJECTION SUBCUTANEOUS at 10:09

## 2017-12-21 RX ADMIN — HYDROCHLOROTHIAZIDE 12.5 MG: 25 TABLET ORAL at 10:10

## 2017-12-21 RX ADMIN — OXYCODONE HYDROCHLORIDE AND ACETAMINOPHEN 1 TABLET: 10; 325 TABLET ORAL at 05:52

## 2017-12-21 RX ADMIN — OXYCODONE HYDROCHLORIDE AND ACETAMINOPHEN 1 TABLET: 10; 325 TABLET ORAL at 10:08

## 2017-12-21 RX ADMIN — CYCLOBENZAPRINE HYDROCHLORIDE 10 MG: 10 TABLET, FILM COATED ORAL at 01:38

## 2017-12-21 RX ADMIN — GABAPENTIN 600 MG: 300 CAPSULE ORAL at 05:51

## 2017-12-21 RX ADMIN — LOSARTAN POTASSIUM 50 MG: 50 TABLET ORAL at 10:10

## 2017-12-21 RX ADMIN — CYCLOBENZAPRINE HYDROCHLORIDE 10 MG: 10 TABLET, FILM COATED ORAL at 10:09

## 2017-12-21 RX ADMIN — OXYCODONE HYDROCHLORIDE AND ACETAMINOPHEN 1 TABLET: 10; 325 TABLET ORAL at 01:38

## 2017-12-21 NOTE — PAYOR COMM NOTE
"[place] [facility] 12-21-17  [id number]  PRIMARY [facility] POLICY MCR STILL NEEDS APPROVAL  UR PHONE JUANCHO   FAX  [phone]    Cynthia Denise (52 y.o. Female)     Date of Birth Social Security Number Address Home Phone MRN    1965  [address] [address] [id number]    Buddhism Marital Status          Mosque        Admission Date Admission Type Admitting Provider Attending Provider Department, Room/Bed    12/19/17 Elective Herve Bojorquez, DPM  [facility] 3A, 357/1    Discharge Date Discharge Disposition Discharge Destination        12/21/2017 Home or Self Care             Attending Provider: (none)    Allergies:  Adhesive Tape, Demerol [Meperidine], Levemir [Insulin Detemir], Tresiba Flextouch [Insulin Degludec]    Isolation:  None   Infection:  None   Code Status:  Prior    Ht:  159.5 cm (62.8\")   Wt:  103 kg (226 lb)    Admission Cmt:  None   Principal Problem:  None                Active Insurance as of 12/19/2017     Primary Coverage     Payor Plan Insurance Group Employer/Plan Group    [facility] MEDICARE REPLACEMENT [facility] [id number]     Payor Plan Address Payor Plan Phone Number Effective From Effective To    [address] [address]  9/1/2017     [place] [address]       Subscriber Name Subscriber Birth Date Member ID       CYNTHIA DENISE 1965 [id number]           Secondary Coverage     Payor Plan Insurance Group Employer/Plan Group    [facility] [id number]     Payor Plan Address Payor Plan Phone Number Effective From Effective To    [address]  11/1/2017     [place] [address]       Subscriber Name Subscriber Birth Date Member ID       NETTIE DENISE 11/16/1972 [id number]           Tertiary Coverage     Payor Plan Insurance Group Employer/Plan Group    [employer] [id number]     Coverage Address Coverage Phone Number Effective From Effective To    [address] [phone] " 11/1/2017     [place] [id number]       Subscriber Name Subscriber Birth Date Member ID       KARL DENISE 11/16/1972 [id number]                 Emergency Contacts      (Rel.) Home Phone Work Phone Mobile Phone    Karl Denise (Spouse) [phone] -- --    Leigh Camejo (Daughter) [phone] -- --               Discharge Summary      Herve Bojorquez DPM at 12/21/2017  6:43 AM          [facility] Discharge Summary     Date of Discharge:  12/21/2017    Discharge Diagnosis: 2 days status post total ankle arthroplasty with implant right    Presenting Problem/History of Present Illness  Post-traumatic arthritis of ankle, right [M19.171]     Hospital Course  Patient is a 52 y.o. female presented with chronic right ankle pain.  She had exhausted conservative measures.  The proceed with total leg length device with implant right.    Procedures Performed  Procedure(s):  TOTAL ANKLE ARTHROPLASTY WITH INFINITY IMPLANT, GASTROCNEMIUS RECESSION RIGHT ANKLE   RECESSION GASTROCNEMIUS RIGHT ANKLE        Consults:   Consults     Date and Time Order Name Status Description    12/19/2017 1627 Inpatient Consult to Hospitalist Completed           Condition on Discharge:  Stable    Vital Signs  Temp:  [97.3 °F (36.3 °C)-98.7 °F (37.1 °C)] 98.2 °F (36.8 °C)  Heart Rate:  [86-91] 87  Resp:  [18-22] 18  BP: (118-136)/(55-68) 118/60    Physical Exam:   Physical Exam   Constitutional: She is oriented to person, place, and time. She appears well-developed and well-nourished.   HENT:   Head: Normocephalic and atraumatic.   Nose: Nose normal.   Eyes: EOM are normal. Pupils are equal, round, and reactive to light.   Neck: Normal range of motion. Neck supple.   Cardiovascular: Normal rate, regular rhythm and intact distal pulses.    Pulmonary/Chest: Effort normal and breath sounds normal.   Abdominal: Soft.   Musculoskeletal: Normal range of motion.   Neurological: She is alert and oriented to  person, place, and time.   Skin: Skin is warm and dry.   Psychiatric: She has a normal mood and affect. Her behavior is normal. Judgment and thought content normal.   Vitals reviewed.      Discharge Disposition  Home or Self Care    Discharge Medications   Riri Denise   Home Medication Instructions ROSANNE:[id number]    Printed on:12/21/17 0643   Medication Information                      aspirin 81 MG EC tablet  Take 81 mg by mouth Daily.             calcium carbonate (OS-AVELINO) 600 MG tablet  Take 600 mg by mouth Daily.             Cholecalciferol (VITAMIN D3) 2000 units tablet  Take 1 capsule by mouth Daily.             cyclobenzaprine (FLEXERIL) 10 MG tablet  Take 10 mg by mouth 3 (Three) Times a Day As Needed for Muscle Spasms.             denosumab (PROLIA) 60 MG/ML solution syringe  Inject 60 mg under the skin 1 (One) Time. EVERY 6 MONTHS             diazePAM (VALIUM) 5 MG tablet  Take 1 tablet by mouth Every 8 (Eight) Hours As Needed for Anxiety.             DULoxetine (CYMBALTA) 60 MG capsule  Take 60 mg by mouth Daily.             enoxaparin (LOVENOX) 40 MG/0.4ML solution syringe  Inject 0.4 mL under the skin Daily for 30 days.             gabapentin (NEURONTIN) 600 MG tablet  Take 600 mg by mouth 3 (Three) Times a Day.             hydrochlorothiazide (HYDRODIURIL) 12.5 MG tablet  Take 12.5 mg by mouth Daily.             Insulin Glargine (TOUJEO SOLOSTAR SC)  Inject 80 Units under the skin Daily.             losartan (COZAAR) 50 MG tablet  Take 50 mg by mouth Daily.             Multiple Vitamin (MULTI VITAMIN DAILY PO)  Take 1 capsule by mouth Daily.             oxyCODONE-acetaminophen (PERCOCET)  MG per tablet  Take 1 tablet by mouth Every 4 (Four) Hours As Needed for Severe Pain  for up to 7 days.             promethazine (PHENERGAN) 12.5 MG tablet  Take 1 tablet by mouth Every 4 (Four) Hours As Needed for Nausea or Vomiting.             simvastatin (ZOCOR) 20 MG tablet  Take 20 mg by mouth Every  Night.                 Discharge Diet:   Diet Instructions     Diet: Consistent Carbohydrate       Discharge Diet:  Consistent Carbohydrate                 Activity at Discharge:   Activity Instructions     Other Instructions (Specify)       Non weight bearing                 Follow-up Appointments  No future appointments.  Additional Instructions for the Follow-ups that You Need to Schedule     Discharge Follow-up with Specialty: tamera; 1 Week    As directed    Specialty:  tamera    Follow Up:  1 Week    Follow Up Details:  should already have appointment scheduled                     Test Results Pending at Discharge       Herve Bojorquez DPM  12/21/17  6:43 AM     Electronically signed by Herve Bojorquez DPM at 12/21/2017  6:44 AM

## 2017-12-21 NOTE — PLAN OF CARE
Problem: Patient Care Overview (Adult)  Goal: Plan of Care Review  Outcome: Outcome(s) achieved Date Met: 12/21/17 12/21/17 1100   Coping/Psychosocial Response Interventions   Plan Of Care Reviewed With patient   Patient Care Overview   Progress improving   Outcome Evaluation   Outcome Summary/Follow up Plan Patient tolerating pain and ready to DC home. Dressing to right ankle remains CDI. Cruthces and wheel chair for home.      Goal: Adult Individualization and Mutuality  Outcome: Outcome(s) achieved Date Met: 12/21/17    Goal: Discharge Needs Assessment  Outcome: Outcome(s) achieved Date Met: 12/21/17      Problem: Perioperative Period (Adult)  Goal: Signs and Symptoms of Listed Potential Problems Will be Absent or Manageable (Perioperative Period)  Outcome: Outcome(s) achieved Date Met: 12/21/17      Problem: Fall Risk (Adult)  Goal: Absence of Falls  Outcome: Outcome(s) achieved Date Met: 12/21/17

## 2017-12-21 NOTE — PROGRESS NOTES
Discharge Planning Assessment  [facility]     Patient Name: Riri Denise  MRN: [id number]  Today's Date: 12/21/2017    Admit Date: 12/19/2017          Discharge Needs Assessment       12/21/17 0916    Living Environment    Lives With spouse    Living Arrangements house    Transportation Available family or friend will provide;car    Living Environment    Primary Care Provided By spouse/significant other    Quality Of Family Relationships supportive;helpful;involved    Able to Return to Prior Living Arrangements yes    Discharge Needs Assessment    Concerns To Be Addressed adjustment to diagnosis/illness concerns    Readmission Within The Last 30 Days no previous admission in last 30 days    Equipment Currently Used at Home cane, straight   Also has access to a walker.    Equipment Needed After Discharge wheelchair;crutches, axillary   Per patient    Discharge Planning Comments Patient is being discharged home today. SW spoke to patient and spouse re discharge planning. Patient requests a wheelchair, knee walker, crutches and a shower chair. SW explained that knee walker and shower chair are not covered by insurance. Crutches are provided by rehab dept. Patient's  would like to take order for wheelchair to [facility]. MARIA DOLORES updated charge nurse. Patient to be provided with Rx for wheelchair.             Discharge Plan     None        Discharge Placement     No information found        Expected Discharge Date and Time     Expected Discharge Date Expected Discharge Time    Dec 21, 2017               Demographic Summary     None            Functional Status     None            Psychosocial     None            Abuse/Neglect     None            Legal     None            Substance Abuse     None            Patient Forms     None          KIRAN Blood

## 2017-12-21 NOTE — DISCHARGE SUMMARY
[facility] Discharge Summary     Date of Discharge:  12/21/2017    Discharge Diagnosis: 2 days status post total ankle arthroplasty with implant right    Presenting Problem/History of Present Illness  Post-traumatic arthritis of ankle, right [M19.171]     Hospital Course  Patient is a 52 y.o. female presented with chronic right ankle pain.  She had exhausted conservative measures.  The proceed with total leg length device with implant right.    Procedures Performed  Procedure(s):  TOTAL ANKLE ARTHROPLASTY WITH INFINITY IMPLANT, GASTROCNEMIUS RECESSION RIGHT ANKLE   RECESSION GASTROCNEMIUS RIGHT ANKLE        Consults:   Consults     Date and Time Order Name Status Description    12/19/2017 1627 Inpatient Consult to Hospitalist Completed           Condition on Discharge:  Stable    Vital Signs  Temp:  [97.3 °F (36.3 °C)-98.7 °F (37.1 °C)] 98.2 °F (36.8 °C)  Heart Rate:  [86-91] 87  Resp:  [18-22] 18  BP: (118-136)/(55-68) 118/60    Physical Exam:   Physical Exam   Constitutional: She is oriented to person, place, and time. She appears well-developed and well-nourished.   HENT:   Head: Normocephalic and atraumatic.   Nose: Nose normal.   Eyes: EOM are normal. Pupils are equal, round, and reactive to light.   Neck: Normal range of motion. Neck supple.   Cardiovascular: Normal rate, regular rhythm and intact distal pulses.    Pulmonary/Chest: Effort normal and breath sounds normal.   Abdominal: Soft.   Musculoskeletal: Normal range of motion.   Neurological: She is alert and oriented to person, place, and time.   Skin: Skin is warm and dry.   Psychiatric: She has a normal mood and affect. Her behavior is normal. Judgment and thought content normal.   Vitals reviewed.      Discharge Disposition  Home or Self Care    Discharge Medications   Riri Denise   Home Medication Instructions ROSANNE:[id number]    Printed on:12/21/17 0643   Medication Information                      aspirin 81 MG EC  tablet  Take 81 mg by mouth Daily.             calcium carbonate (OS-AVELINO) 600 MG tablet  Take 600 mg by mouth Daily.             Cholecalciferol (VITAMIN D3) 2000 units tablet  Take 1 capsule by mouth Daily.             cyclobenzaprine (FLEXERIL) 10 MG tablet  Take 10 mg by mouth 3 (Three) Times a Day As Needed for Muscle Spasms.             denosumab (PROLIA) 60 MG/ML solution syringe  Inject 60 mg under the skin 1 (One) Time. EVERY 6 MONTHS             diazePAM (VALIUM) 5 MG tablet  Take 1 tablet by mouth Every 8 (Eight) Hours As Needed for Anxiety.             DULoxetine (CYMBALTA) 60 MG capsule  Take 60 mg by mouth Daily.             enoxaparin (LOVENOX) 40 MG/0.4ML solution syringe  Inject 0.4 mL under the skin Daily for 30 days.             gabapentin (NEURONTIN) 600 MG tablet  Take 600 mg by mouth 3 (Three) Times a Day.             hydrochlorothiazide (HYDRODIURIL) 12.5 MG tablet  Take 12.5 mg by mouth Daily.             Insulin Glargine (TOUJEO SOLOSTAR SC)  Inject 80 Units under the skin Daily.             losartan (COZAAR) 50 MG tablet  Take 50 mg by mouth Daily.             Multiple Vitamin (MULTI VITAMIN DAILY PO)  Take 1 capsule by mouth Daily.             oxyCODONE-acetaminophen (PERCOCET)  MG per tablet  Take 1 tablet by mouth Every 4 (Four) Hours As Needed for Severe Pain  for up to 7 days.             promethazine (PHENERGAN) 12.5 MG tablet  Take 1 tablet by mouth Every 4 (Four) Hours As Needed for Nausea or Vomiting.             simvastatin (ZOCOR) 20 MG tablet  Take 20 mg by mouth Every Night.                 Discharge Diet:   Diet Instructions     Diet: Consistent Carbohydrate       Discharge Diet:  Consistent Carbohydrate                 Activity at Discharge:   Activity Instructions     Other Instructions (Specify)       Non weight bearing                 Follow-up Appointments  No future appointments.  Additional Instructions for the Follow-ups that You Need to Schedule     Discharge  Follow-up with Specialty: tamera; 1 Week    As directed    Specialty:  tamera    Follow Up:  1 Week    Follow Up Details:  should already have appointment scheduled                     Test Results Pending at Discharge       Herve Bojorquez DPM  12/21/17  6:43 AM

## 2017-12-21 NOTE — THERAPY DISCHARGE NOTE
Acute Care - Physical Therapy Treatment Note/Discharge  [facility]     Patient Name: Riri Denise  : 1965  MRN: [id number]  Today's Date: 2017  Onset of Illness/Injury or Date of Surgery Date: 17  Date of Referral to PT: 17  Referring Physician: Dr. Bojorquez    Admit Date: 2017    Visit Dx:    ICD-10-CM ICD-9-CM   1. Impaired mobility Z74.09 799.89     Patient Active Problem List   Diagnosis   • Post-traumatic arthritis of ankle, right       Physical Therapy Education     Title: PT OT SLP Therapies (Resolved)     Topic: Physical Therapy (Resolved)     Point: Mobility training (Resolved)    Learning Progress Summary    Learner Readiness Method Response Comment Documented by Status   Patient Acceptance E VU crutches fit to patient. Pt reports that she already knows how to use the crutches appropriately MS 17 1111 Done    Acceptance E,D VU,DU benefits of PT and POC, call for assist w/ mobility, discussed options for entering home up 2 steps  17 1157 Done   Caregiver Acceptance E,D VU,DU benefits of PT and POC, call for assist w/ mobility, discussed options for entering home up 2 steps  17 1157 Done               Point: Precautions (Resolved)    Learning Progress Summary    Learner Readiness Method Response Comment Documented by Status   Patient Acceptance E,D VU,DU benefits of PT and POC, call for assist w/ mobility, discussed options for entering home up 2 steps  17 1157 Done   Caregiver Acceptance E,D VU,DU benefits of PT and POC, call for assist w/ mobility, discussed options for entering home up 2 steps  17 1157 Done                      User Key     Initials Effective Dates Name Provider Type Discipline     16 -  Marc Mckenzie, PT Physical Therapist PT    MS 16 -  Seema Barillas, PT DPT Physical Therapist PT                    IP PT Goals       17 1112 17 1201       Transfer Training PT LTG    Transfer Training PT LTG,  Date Established  12/20/17  -     Transfer Training PT LTG, Time to Achieve  by discharge  -     Transfer Training PT LTG, Activity Type  bed to chair /chair to bed;sit to stand/stand to sit  -     Transfer Training PT LTG, [place] Level  supervision required  -     Transfer Training PT LTG, Assist Device  walker, rolling  -LH     Transfer Training PT LTG, Additional Goal  NWB R ankle  -     Transfer Training PT  LTG, Date Goal Reviewed 12/21/17  -MS      Transfer Training PT LTG, Outcome goal met  -MS      Gait Training PT LTG    Gait Training Goal PT LTG, Date Established  12/20/17  -     Gait Training Goal PT LTG, Time to Achieve  by discharge  -     Gait Training Goal PT LTG, [place] Level  supervision required  -     Gait Training Goal PT LTG, Assist Device  walker, rolling  -     Gait Training Goal PT LTG, Distance to Achieve  50  -LH     Gait Training Goal PT LTG, Additional Goal  may trial knee walker  -     Gait Training Goal PT LTG, Date Goal Reviewed 12/21/17  -MS      Gait Training Goal PT LTG, Outcome goal met  -MS      Stair Training PT LTG    Stair Training Goal PT LTG, Date Established  12/20/17  -     Stair Training Goal PT LTG, Time to Achieve  by discharge  -     Stair Training Goal PT LTG, Number of Steps  2  -     Stair Training Goal PT LTG, [place] Level  contact guard assist  -     Stair Training Goal PT LTG, Assist Device  1 handrail;other (see comments)  -     Stair Training Goal PT LTG, Date Goal Reviewed 12/21/17  -MS      Stair Training Goal PT LTG, Outcome goal not met  -MS      Stair Training Goal PT LTG, Reason Goal Not Met discharged from facility  -MS        User Key  (r) = Recorded By, (t) = Taken By, (c) = Cosigned By    Initials Name Provider Type     Marc Mckenzie, PT Physical Therapist    MS Seema Barillas, PT DPT Physical Therapist              Adult Rehabilitation Note       12/21/17 1052 12/20/17 1442 12/20/17 1358    Rehab  Assessment/Intervention    Discipline physical therapist  -MS physical therapy assistant  -KJ --  -KJ    Document Type therapy note (daily note)  -MS therapy note (daily note)  -KJ --  -KJ    Subjective Information agree to therapy  -MS agree to therapy  -KJ     Patient Effort, Rehab Treatment  good  -KJ     Precautions/Limitations fall precautions;non-weight bearing status   R LE  -MS fall precautions;non-weight bearing status   RLE  -KJ     Equipment Issued to Patient crutches  -MS      Recorded by [MS] Seema Barillas, PT DPT [KJ] Ivet Hughes PTA [KJ] Ivet Hughes PTA    Pain Assessment    Pain Assessment 0-10  -MS 0-10  -KJ     Pain Score 4  -MS 4  -KJ     Pain Type Acute pain;Surgical pain  -MS Acute pain;Surgical pain  -KJ     Pain Descriptors  Spasm  -KJ     Pain Frequency  Constant/continuous  -KJ     Pain Intervention(s)  Medication (See MAR)  -KJ     Response to Interventions  toelrated  -KJ     Recorded by [MS] Seema Barillas, PT DPT [KJ] Ivet Hughes PTA     Bed Mobility, Assessment/Treatment    Bed Mob, Supine to Sit, [place]  independent  -KJ     Bed Mob, Sit to Supine, [place]  independent  -KJ     Recorded by  [KJ] Ivet Hughes PTA     Transfer Assessment/Treatment    Transfers, Sit-Stand [place] conditional independence  -MS supervision required  -KJ     Transfers, Stand-Sit [place] conditional independence  -MS supervision required  -KJ     Transfers, Sit-Stand-Sit, Assist Device axillary crutches  -MS      Transfer, Comment pt reports that she has used crutches many times in the past and only needs assist with fitting the crutches.   -MS      Recorded by [MS] Seema Barillas, PT DPT [KJ] Ivet Hughes PTA     Gait Assessment/Treatment    Gait, [place] Level  verbal cues required;contact guard assist  -KJ     Gait, Assistive Device  rolling walker  -KJ     Gait, Distance (Feet)  85  -KJ     Gait, Maintain Weight Bearing Status  able to maintain weight  bearing status  -KJ     Gait, Comment pt able to hop 3 ft  -MS      Recorded by [MS] Seema Barillas, PT DPT [KJ] Ivet Hughes PTA     Positioning and Restraints    Pre-Treatment Position  in bed  -KJ     Post Treatment Position bed  -MS bed  -KJ     In Bed sitting EOB;notified nsg  -MS      Recorded by [MS] Seema Barillas, PT DPT [KJ] Ivet Hughes PTA       User Key  (r) = Recorded By, (t) = Taken By, (c) = Cosigned By    Initials Name Effective Dates    KJ Ivet Hughes PTA 08/02/16 -     MS Seema Barillas, PT DPT 08/02/16 -           PT Recommendation and Plan  Anticipated Equipment Needs At Discharge: rolling knee walker, front wheeled walker, tub bench  Anticipated Discharge Disposition: home with assist  Planned Therapy Interventions: gait training, patient/family education, transfer training, stair training  PT Frequency: 2 times/day, per priority policy  Plan of Care Review  Plan Of Care Reviewed With: patient  Progress: progress toward functional goals as expected  Outcome Summary/Follow up Plan: The patient was fitted with crutches per MD request. She states that she has used crutches many times in the past and does not need education on how to use them. The patient is discharging home at this time.           Outcome Measures       12/21/17 1052 12/20/17 1156       How much help from another person do you currently need...    Turning from your back to your side while in flat bed without using bedrails? 4  -MS 4  -LH     Moving from lying on back to sitting on the side of a flat bed without bedrails? 4  -MS 4  -LH     Moving to and from a bed to a chair (including a wheelchair)? 4  -MS 3  -LH     Standing up from a chair using your arms (e.g., wheelchair, bedside chair)? 4  -MS 3  -LH     Climbing 3-5 steps with a railing? 3  -MS 2  -LH     To walk in hospital room? 4  -MS 3  -LH     AM-PAC 6 Clicks Score 23  -MS 19  -LH     Functional Assessment    Outcome Measure Options AM-PAC 6 Clicks Basic  Mobility (PT)  -MS AM-PAC 6 Clicks Basic Mobility (PT)  -       User Key  (r) = Recorded By, (t) = Taken By, (c) = Cosigned By    Initials Name Provider Type     Marc Mckenzie, PT Physical Therapist    MS Seema Barillas, PT DPT Physical Therapist           Time Calculation:         PT Charges       12/21/17 1114          Time Calculation    Start Time 1052  -MS      Stop Time 1108  -MS      Time Calculation (min) 16 min  -MS      PT Received On 12/21/17  -MS        User Key  (r) = Recorded By, (t) = Taken By, (c) = Cosigned By    Initials Name Provider Type    MS Seema Barillas, PT DPT Physical Therapist          Therapy Charges for Today     Code Description Service Date Service Provider Modifiers Qty    39027493674 HC PT MOBILITY CURRENT 12/21/2017 Seema Barillas, PT DPT GP, CI 1    72610433695 HC PT MOBILITY PROJECTED 12/21/2017 Seema Barillas, PT DPT GP, CI 1    51799126267 HC PT MOBILITY DISCHARGE 12/21/2017 Seema Barillas, PT DPT GP, CI 1    86221515551 HC PT THERAPEUTIC ACT EA 15 MIN 12/21/2017 Seema Barillas, PT DPT GP 1          PT G-Codes  Outcome Measure Options: AM-PAC 6 Clicks Basic Mobility (PT)  Score: 23  Functional Limitation: Mobility: Walking and moving around  Mobility: Walking and Moving Around Current Status (): At least 1 percent but less than 20 percent impaired, limited or restricted  Mobility: Walking and Moving Around Goal Status (): At least 1 percent but less than 20 percent impaired, limited or restricted  Mobility: Walking and Moving Around Discharge Status (): At least 1 percent but less than 20 percent impaired, limited or restricted    PT Discharge Summary  Reason for Discharge: Discharge from facility  Outcomes Achieved: Refer to plan of care for updates on goals achieved  Discharge Destination: Home with assist    Seema Barillas, PT DPT  12/21/2017

## 2017-12-21 NOTE — PLAN OF CARE
Problem: Patient Care Overview (Adult)  Goal: Plan of Care Review  Outcome: Ongoing (interventions implemented as appropriate)   12/21/17 1112   Coping/Psychosocial Response Interventions   Plan Of Care Reviewed With patient   Patient Care Overview   Progress progress toward functional goals as expected   Outcome Evaluation   Outcome Summary/Follow up Plan The patient was fitted with crutches per MD request. She states that she has used crutches many times in the past and does not need education on how to use them. The patient is discharging home at this time.        Problem: Inpatient Physical Therapy  Goal: Transfer Training Goal 1 LTG- PT  Outcome: Outcome(s) achieved Date Met: 12/21/17 12/20/17 1201 12/21/17 1112   Transfer Training PT LTG   Transfer Training PT LTG, Date Established 12/20/17 --    Transfer Training PT LTG, Time to Achieve by discharge --    Transfer Training PT LTG, Activity Type bed to chair /chair to bed;sit to stand/stand to sit --    Transfer Training PT LTG, [place] Level supervision required --    Transfer Training PT LTG, Assist Device walker, rolling --    Transfer Training PT LTG, Additional Goal NWB R ankle --    Transfer Training PT LTG, Date Goal Reviewed --  12/21/17   Transfer Training PT LTG, Outcome --  goal met     Goal: Gait Training Goal LTG- PT  Outcome: Outcome(s) achieved Date Met: 12/21/17 12/20/17 1201 12/21/17 1112   Gait Training PT LTG   Gait Training Goal PT LTG, Date Established 12/20/17 --    Gait Training Goal PT LTG, Time to Achieve by discharge --    Gait Training Goal PT LTG, [place] Level supervision required --    Gait Training Goal PT LTG, Assist Device walker, rolling --    Gait Training Goal PT LTG, Distance to Achieve 50 --    Gait Training Goal PT LTG, Additional Goal may trial knee walker --    Gait Training Goal PT LTG, Date Goal Reviewed --  12/21/17   Gait Training Goal PT LTG, Outcome --  goal met     Goal: Stair Training Goal LTG-  PT  Outcome: Unable to achieve outcome(s) by discharge Date Met: 12/21/17 12/20/17 1201 12/21/17 1112   Stair Training PT LTG   Stair Training Goal PT LTG, Date Established 12/20/17 --    Stair Training Goal PT LTG, Time to Achieve by discharge --    Stair Training Goal PT LTG, Number of Steps 2 --    Stair Training Goal PT LTG, [place] Level contact guard assist --    Stair Training Goal PT LTG, Assist Device 1 handrail;other (see comments) --    Stair Training Goal PT LTG, Date Goal Reviewed --  12/21/17   Stair Training Goal PT LTG, Outcome --  goal not met   Stair Training Goal PT LTG, Reason Goal Not Met --  discharged from facility

## 2017-12-21 NOTE — PLAN OF CARE
Problem: Patient Care Overview (Adult)  Goal: Plan of Care Review  Outcome: Ongoing (interventions implemented as appropriate)   12/21/17 0353   Coping/Psychosocial Response Interventions   Plan Of Care Reviewed With patient   Patient Care Overview   Progress no change   Outcome Evaluation   Outcome Summary/Follow up Plan vss; alert and oriented x 4; c/o right ankle pain; prn pain meds offered relief; nwb on right leg; up x 1 to bsc; safety maintained     Goal: Adult Individualization and Mutuality  Outcome: Ongoing (interventions implemented as appropriate)    Goal: Discharge Needs Assessment  Outcome: Ongoing (interventions implemented as appropriate)      Problem: Perioperative Period (Adult)  Goal: Signs and Symptoms of Listed Potential Problems Will be Absent or Manageable (Perioperative Period)  Outcome: Ongoing (interventions implemented as appropriate)      Problem: Fall Risk (Adult)  Goal: Absence of Falls  Outcome: Ongoing (interventions implemented as appropriate)

## 2017-12-21 NOTE — PROGRESS NOTES
Orthopedic Foot/Ankle Progress Note    Subjective     Hospital Course: stable    Patient is in the bedside this morning.  Relates her pain is basically controlled controlled on oral Percocet.  She has been urinating without difficulty.  Has not yet moved her bowels.  Denies any chest pain or shortness of breath, overall doing quite well.    Objective     Vital signs in last 24 hours:  Temp:  [97.3 °F (36.3 °C)-98.7 °F (37.1 °C)] 98.2 °F (36.8 °C)  Heart Rate:  [86-91] 87  Resp:  [18-22] 18  BP: (118-136)/(55-68) 118/60    Splint intact right lower extremity.        Data Review  CBC:  Results from last 7 days  Lab Units 12/20/17 0723   WBC 10*3/mm3 11.70*   RBC 10*6/mm3 4.34   HEMOGLOBIN g/dL 12.8   HEMATOCRIT % 39.0   PLATELETS 10*3/mm3 241     Lab Results (last 24 hours)     Procedure Component Value Units Date/Time    CBC (No Diff) [[id number]]  (Abnormal) Collected:  12/20/17 0723    Specimen:  Blood Updated:  12/20/17 0744     WBC 11.70 (H) 10*3/mm3      RBC 4.34 10*6/mm3      Hemoglobin 12.8 g/dL      Hematocrit 39.0 %      MCV 89.9 fL      MCH 29.5 pg      MCHC 32.8 (L) g/dL      RDW 12.7 %      RDW-SD 42.0 fl      MPV 9.3 fL      Platelets 241 10*3/mm3     Basic Metabolic Panel [[id number]]  (Abnormal) Collected:  12/20/17 0723    Specimen:  Blood Updated:  12/20/17 0756     Glucose 169 (H) mg/dL      BUN 13 mg/dL      Creatinine 0.68 mg/dL      Sodium 139 mmol/L      Potassium 4.1 mmol/L      Chloride 97 (L) mmol/L      CO2 31.0 mmol/L      Calcium 9.1 mg/dL      eGFR Non African Amer 91 mL/min/1.73      BUN/Creatinine Ratio 19.1     Anion Gap 11.0 mmol/L     Narrative:       GFR Normal >60  Chronic Kidney Disease <60  Kidney Failure <15    POC Glucose Once [[id number]]  (Abnormal) Collected:  12/20/17 0840    Specimen:  Blood Updated:  12/20/17 0852     Glucose 156 (H) mg/dL       : [id number] Brandon Seay CMeter ID: [id number]       POC Glucose Once [[id number]]  (Abnormal) Collected:  12/20/17 1208     Specimen:  Blood Updated:  12/20/17 1219     Glucose 198 (H) mg/dL       : [id number] Brandon Geena CMeter ID: [id number]       POC Glucose Once [[id number]]  (Abnormal) Collected:  12/20/17 1708    Specimen:  Blood Updated:  12/20/17 1719     Glucose 139 (H) mg/dL       : [id number] Brandon Geena CMeter ID: [id number]       POC Glucose Once [[id number]]  (Abnormal) Collected:  12/20/17 2107    Specimen:  Blood Updated:  12/20/17 2118     Glucose 181 (H) mg/dL       : [id number] NeronoteucMeter ID: [id number]             Assessment/Plan     2 days status post total ankle arthroplasty with implant right and gastrocnemius recession.  Diabetes mellitus controlled      Plan  May be discharged home at this point.  Nonweightbearing right lower extremity.   LOS: 2 days     Herve Bojorquez DPM    Date: 12/21/2017  Time: 6:36 AM

## 2017-12-27 ENCOUNTER — OFFICE VISIT (OUTPATIENT)
Dept: FAMILY MEDICINE CLINIC | Age: 52
End: 2017-12-27
Payer: MEDICARE

## 2017-12-27 VITALS
HEART RATE: 83 BPM | DIASTOLIC BLOOD PRESSURE: 70 MMHG | OXYGEN SATURATION: 94 % | SYSTOLIC BLOOD PRESSURE: 122 MMHG | TEMPERATURE: 98.4 F

## 2017-12-27 DIAGNOSIS — Z96.661 AFTERCARE FOLLOWING RIGHT ANKLE JOINT REPLACEMENT SURGERY: Primary | ICD-10-CM

## 2017-12-27 DIAGNOSIS — I15.9 SECONDARY HYPERTENSION: ICD-10-CM

## 2017-12-27 DIAGNOSIS — E78.5 HYPERLIPIDEMIA, UNSPECIFIED HYPERLIPIDEMIA TYPE: ICD-10-CM

## 2017-12-27 DIAGNOSIS — Z47.1 AFTERCARE FOLLOWING RIGHT ANKLE JOINT REPLACEMENT SURGERY: Primary | ICD-10-CM

## 2017-12-27 PROCEDURE — 99214 OFFICE O/P EST MOD 30 MIN: CPT | Performed by: FAMILY MEDICINE

## 2017-12-27 PROCEDURE — G8427 DOCREV CUR MEDS BY ELIG CLIN: HCPCS | Performed by: FAMILY MEDICINE

## 2017-12-27 PROCEDURE — 3014F SCREEN MAMMO DOC REV: CPT | Performed by: FAMILY MEDICINE

## 2017-12-27 PROCEDURE — 4004F PT TOBACCO SCREEN RCVD TLK: CPT | Performed by: FAMILY MEDICINE

## 2017-12-27 PROCEDURE — G8484 FLU IMMUNIZE NO ADMIN: HCPCS | Performed by: FAMILY MEDICINE

## 2017-12-27 PROCEDURE — 3017F COLORECTAL CA SCREEN DOC REV: CPT | Performed by: FAMILY MEDICINE

## 2017-12-27 PROCEDURE — 3045F PR MOST RECENT HEMOGLOBIN A1C LEVEL 7.0-9.0%: CPT | Performed by: FAMILY MEDICINE

## 2017-12-27 PROCEDURE — G8421 BMI NOT CALCULATED: HCPCS | Performed by: FAMILY MEDICINE

## 2017-12-27 RX ORDER — OXYCODONE AND ACETAMINOPHEN 10; 325 MG/1; MG/1
1 TABLET ORAL EVERY 4 HOURS PRN
COMMUNITY
End: 2018-08-17 | Stop reason: ALTCHOICE

## 2017-12-27 RX ORDER — BLOOD-GLUCOSE METER
1 KIT MISCELLANEOUS DAILY
Qty: 1 KIT | Refills: 0 | Status: SHIPPED | OUTPATIENT
Start: 2017-12-27

## 2017-12-27 RX ORDER — DIAZEPAM 5 MG/1
5 TABLET ORAL EVERY 6 HOURS PRN
COMMUNITY
End: 2018-08-17 | Stop reason: ALTCHOICE

## 2017-12-27 ASSESSMENT — ENCOUNTER SYMPTOMS
RESPIRATORY NEGATIVE: 1
EYES NEGATIVE: 1
ALLERGIC/IMMUNOLOGIC NEGATIVE: 1
GASTROINTESTINAL NEGATIVE: 1

## 2017-12-29 ENCOUNTER — TELEPHONE (OUTPATIENT)
Dept: FAMILY MEDICINE CLINIC | Age: 52
End: 2017-12-29

## 2017-12-29 RX ORDER — GLUCOSAMINE HCL/CHONDROITIN SU 500-400 MG
CAPSULE ORAL
Qty: 100 STRIP | Refills: 0 | Status: SHIPPED | OUTPATIENT
Start: 2017-12-29 | End: 2018-12-06 | Stop reason: SDUPTHER

## 2018-01-09 DIAGNOSIS — I10 ESSENTIAL HYPERTENSION: ICD-10-CM

## 2018-01-09 DIAGNOSIS — E78.5 HYPERLIPIDEMIA, UNSPECIFIED HYPERLIPIDEMIA TYPE: ICD-10-CM

## 2018-01-09 RX ORDER — SIMVASTATIN 20 MG
10 TABLET ORAL NIGHTLY
Qty: 90 TABLET | Refills: 1 | Status: SHIPPED | OUTPATIENT
Start: 2018-01-09 | End: 2018-08-17 | Stop reason: SDUPTHER

## 2018-01-09 RX ORDER — CYCLOBENZAPRINE HCL 10 MG
10 TABLET ORAL 3 TIMES DAILY PRN
Qty: 270 TABLET | Refills: 3 | Status: SHIPPED | OUTPATIENT
Start: 2018-01-09 | End: 2018-08-17 | Stop reason: SDUPTHER

## 2018-01-09 RX ORDER — LOSARTAN POTASSIUM 50 MG/1
50 TABLET ORAL DAILY
Qty: 90 TABLET | Refills: 1 | Status: SHIPPED | OUTPATIENT
Start: 2018-01-09 | End: 2018-08-17 | Stop reason: SDUPTHER

## 2018-01-09 RX ORDER — DULOXETIN HYDROCHLORIDE 60 MG/1
60 CAPSULE, DELAYED RELEASE ORAL DAILY
Qty: 90 CAPSULE | Refills: 3 | Status: SHIPPED | OUTPATIENT
Start: 2018-01-09 | End: 2018-08-17 | Stop reason: SDUPTHER

## 2018-02-02 LAB
CALCIUM SERPL-MCNC: 9.8 MG/DL (ref 8.6–10)
VITAMIN D 25-HYDROXY: 34.7 NG/ML

## 2018-04-22 DIAGNOSIS — E78.5 HYPERLIPIDEMIA: ICD-10-CM

## 2018-04-23 RX ORDER — SIMVASTATIN 20 MG
TABLET ORAL
Qty: 90 TABLET | Refills: 1 | Status: SHIPPED | OUTPATIENT
Start: 2018-04-23 | End: 2018-12-06 | Stop reason: SDUPTHER

## 2018-04-23 RX ORDER — DULOXETIN HYDROCHLORIDE 60 MG/1
CAPSULE, DELAYED RELEASE ORAL
Qty: 30 CAPSULE | Refills: 5 | Status: SHIPPED | OUTPATIENT
Start: 2018-04-23 | End: 2018-12-06 | Stop reason: SDUPTHER

## 2018-04-29 DIAGNOSIS — I10 ESSENTIAL HYPERTENSION: ICD-10-CM

## 2018-04-30 RX ORDER — LOSARTAN POTASSIUM 50 MG/1
TABLET ORAL
Qty: 90 TABLET | Refills: 0 | Status: SHIPPED | OUTPATIENT
Start: 2018-04-30 | End: 2018-12-06 | Stop reason: SDUPTHER

## 2018-04-30 RX ORDER — HYDROCHLOROTHIAZIDE 12.5 MG/1
CAPSULE, GELATIN COATED ORAL
Qty: 90 CAPSULE | Refills: 0 | Status: SHIPPED | OUTPATIENT
Start: 2018-04-30 | End: 2018-09-08 | Stop reason: SDUPTHER

## 2018-05-15 RX ORDER — INSULIN GLARGINE 300 U/ML
INJECTION, SOLUTION SUBCUTANEOUS
Qty: 5 PEN | Refills: 5 | Status: SHIPPED | OUTPATIENT
Start: 2018-05-15 | End: 2018-08-14 | Stop reason: SDUPTHER

## 2018-05-21 RX ORDER — CYCLOBENZAPRINE HCL 10 MG
TABLET ORAL
Qty: 90 TABLET | Refills: 5 | Status: SHIPPED | OUTPATIENT
Start: 2018-05-21 | End: 2019-02-19 | Stop reason: ALTCHOICE

## 2018-08-14 RX ORDER — INSULIN GLARGINE 300 U/ML
INJECTION, SOLUTION SUBCUTANEOUS
Qty: 10 PEN | Refills: 5 | Status: SHIPPED | OUTPATIENT
Start: 2018-08-14 | End: 2018-12-06 | Stop reason: SDUPTHER

## 2018-08-15 LAB
CALCIUM SERPL-MCNC: 10.4 MG/DL (ref 8.6–10)
VITAMIN D 25-HYDROXY: >60 NG/ML

## 2018-08-17 ENCOUNTER — OFFICE VISIT (OUTPATIENT)
Dept: FAMILY MEDICINE CLINIC | Age: 53
End: 2018-08-17
Payer: COMMERCIAL

## 2018-08-17 ENCOUNTER — HOSPITAL ENCOUNTER (OUTPATIENT)
Dept: GENERAL RADIOLOGY | Age: 53
Discharge: HOME | End: 2018-08-17
Payer: COMMERCIAL

## 2018-08-17 VITALS
WEIGHT: 220 LBS | HEART RATE: 86 BPM | HEIGHT: 63 IN | TEMPERATURE: 99 F | SYSTOLIC BLOOD PRESSURE: 120 MMHG | BODY MASS INDEX: 38.98 KG/M2 | OXYGEN SATURATION: 96 % | DIASTOLIC BLOOD PRESSURE: 76 MMHG

## 2018-08-17 DIAGNOSIS — M54.50 LOW BACK PAIN WITHOUT SCIATICA, UNSPECIFIED BACK PAIN LATERALITY, UNSPECIFIED CHRONICITY: ICD-10-CM

## 2018-08-17 DIAGNOSIS — M54.50 LOW BACK PAIN WITHOUT SCIATICA, UNSPECIFIED BACK PAIN LATERALITY, UNSPECIFIED CHRONICITY: Primary | ICD-10-CM

## 2018-08-17 PROCEDURE — G8417 CALC BMI ABV UP PARAM F/U: HCPCS | Performed by: FAMILY MEDICINE

## 2018-08-17 PROCEDURE — 3017F COLORECTAL CA SCREEN DOC REV: CPT | Performed by: FAMILY MEDICINE

## 2018-08-17 PROCEDURE — 99214 OFFICE O/P EST MOD 30 MIN: CPT | Performed by: FAMILY MEDICINE

## 2018-08-17 PROCEDURE — 72100 X-RAY EXAM L-S SPINE 2/3 VWS: CPT

## 2018-08-17 PROCEDURE — 4004F PT TOBACCO SCREEN RCVD TLK: CPT | Performed by: FAMILY MEDICINE

## 2018-08-17 PROCEDURE — 2022F DILAT RTA XM EVC RTNOPTHY: CPT | Performed by: FAMILY MEDICINE

## 2018-08-17 PROCEDURE — G8427 DOCREV CUR MEDS BY ELIG CLIN: HCPCS | Performed by: FAMILY MEDICINE

## 2018-08-17 PROCEDURE — G0444 DEPRESSION SCREEN ANNUAL: HCPCS | Performed by: FAMILY MEDICINE

## 2018-08-17 PROCEDURE — 3046F HEMOGLOBIN A1C LEVEL >9.0%: CPT | Performed by: FAMILY MEDICINE

## 2018-08-17 RX ORDER — HYDROCODONE BITARTRATE AND ACETAMINOPHEN 7.5; 325 MG/1; MG/1
1 TABLET ORAL EVERY 8 HOURS PRN
COMMUNITY
End: 2019-12-06 | Stop reason: SINTOL

## 2018-08-17 RX ORDER — GABAPENTIN 600 MG/1
600 TABLET ORAL
COMMUNITY
End: 2020-03-06

## 2018-08-17 ASSESSMENT — ENCOUNTER SYMPTOMS
GASTROINTESTINAL NEGATIVE: 1
RESPIRATORY NEGATIVE: 1
EYES NEGATIVE: 1
BACK PAIN: 1
ALLERGIC/IMMUNOLOGIC NEGATIVE: 1

## 2018-08-17 ASSESSMENT — PATIENT HEALTH QUESTIONNAIRE - PHQ9
6. FEELING BAD ABOUT YOURSELF - OR THAT YOU ARE A FAILURE OR HAVE LET YOURSELF OR YOUR FAMILY DOWN: 1
10. IF YOU CHECKED OFF ANY PROBLEMS, HOW DIFFICULT HAVE THESE PROBLEMS MADE IT FOR YOU TO DO YOUR WORK, TAKE CARE OF THINGS AT HOME, OR GET ALONG WITH OTHER PEOPLE: 2
2. FEELING DOWN, DEPRESSED OR HOPELESS: 1
SUM OF ALL RESPONSES TO PHQ QUESTIONS 1-9: 16
9. THOUGHTS THAT YOU WOULD BE BETTER OFF DEAD, OR OF HURTING YOURSELF: 1
8. MOVING OR SPEAKING SO SLOWLY THAT OTHER PEOPLE COULD HAVE NOTICED. OR THE OPPOSITE, BEING SO FIGETY OR RESTLESS THAT YOU HAVE BEEN MOVING AROUND A LOT MORE THAN USUAL: 1
4. FEELING TIRED OR HAVING LITTLE ENERGY: 3
SUM OF ALL RESPONSES TO PHQ QUESTIONS 1-9: 16
5. POOR APPETITE OR OVEREATING: 2
1. LITTLE INTEREST OR PLEASURE IN DOING THINGS: 3
7. TROUBLE CONCENTRATING ON THINGS, SUCH AS READING THE NEWSPAPER OR WATCHING TELEVISION: 1
3. TROUBLE FALLING OR STAYING ASLEEP: 3
SUM OF ALL RESPONSES TO PHQ9 QUESTIONS 1 & 2: 4
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
## 2018-10-22 PROCEDURE — G0123 SCREEN CERV/VAG THIN LAYER: HCPCS | Performed by: OBSTETRICS & GYNECOLOGY

## 2018-10-23 ENCOUNTER — LAB REQUISITION (OUTPATIENT)
Dept: LAB | Facility: HOSPITAL | Age: 53
End: 2018-10-23

## 2018-10-23 DIAGNOSIS — Z12.72 ENCOUNTER FOR SCREENING FOR MALIGNANT NEOPLASM OF VAGINA: ICD-10-CM

## 2018-10-26 LAB
GEN CATEG CVX/VAG CYTO-IMP: NORMAL
LAB AP CASE REPORT: NORMAL
LAB AP GYN ADDITIONAL INFORMATION: NORMAL
LAB AP GYN OTHER FINDINGS: NORMAL
PATH INTERP SPEC-IMP: NORMAL
STAT OF ADQ CVX/VAG CYTO-IMP: NORMAL

## 2018-12-06 DIAGNOSIS — M19.90 OSTEOARTHRITIS, UNSPECIFIED OSTEOARTHRITIS TYPE, UNSPECIFIED SITE: ICD-10-CM

## 2018-12-06 DIAGNOSIS — R60.9 PERIPHERAL EDEMA: ICD-10-CM

## 2018-12-06 DIAGNOSIS — E78.5 HYPERLIPIDEMIA, UNSPECIFIED HYPERLIPIDEMIA TYPE: ICD-10-CM

## 2018-12-06 DIAGNOSIS — I10 ESSENTIAL HYPERTENSION: ICD-10-CM

## 2018-12-06 RX ORDER — GLUCOSAMINE HCL/CHONDROITIN SU 500-400 MG
CAPSULE ORAL
Qty: 100 STRIP | Refills: 0 | Status: SHIPPED | OUTPATIENT
Start: 2018-12-06 | End: 2018-12-10 | Stop reason: SDUPTHER

## 2018-12-06 RX ORDER — LANCETS 30 GAUGE
1 EACH MISCELLANEOUS 2 TIMES DAILY
Qty: 300 EACH | Refills: 1 | Status: SHIPPED | OUTPATIENT
Start: 2018-12-06 | End: 2018-12-10 | Stop reason: SDUPTHER

## 2018-12-06 RX ORDER — CYCLOBENZAPRINE HCL 10 MG
TABLET ORAL
Qty: 90 TABLET | Refills: 5 | Status: CANCELLED | OUTPATIENT
Start: 2018-12-06

## 2018-12-06 RX ORDER — POTASSIUM CHLORIDE 20 MEQ/1
20 TABLET, EXTENDED RELEASE ORAL DAILY
Qty: 90 TABLET | Refills: 3 | Status: SHIPPED | OUTPATIENT
Start: 2018-12-06 | End: 2018-12-10 | Stop reason: SDUPTHER

## 2018-12-06 RX ORDER — LOSARTAN POTASSIUM 50 MG/1
TABLET ORAL
Qty: 90 TABLET | Refills: 3 | Status: SHIPPED | OUTPATIENT
Start: 2018-12-06 | End: 2018-12-10 | Stop reason: SDUPTHER

## 2018-12-06 RX ORDER — NABUMETONE 500 MG/1
500 TABLET, FILM COATED ORAL 2 TIMES DAILY
Qty: 180 TABLET | Refills: 3 | Status: SHIPPED | OUTPATIENT
Start: 2018-12-06 | End: 2018-12-10 | Stop reason: SDUPTHER

## 2018-12-06 RX ORDER — DULOXETIN HYDROCHLORIDE 60 MG/1
CAPSULE, DELAYED RELEASE ORAL
Qty: 90 CAPSULE | Refills: 3 | Status: SHIPPED | OUTPATIENT
Start: 2018-12-06 | End: 2018-12-10 | Stop reason: SDUPTHER

## 2018-12-06 RX ORDER — FUROSEMIDE 40 MG/1
40 TABLET ORAL DAILY
Qty: 90 TABLET | Refills: 3 | Status: SHIPPED | OUTPATIENT
Start: 2018-12-06 | End: 2018-12-10 | Stop reason: SDUPTHER

## 2018-12-06 RX ORDER — SIMVASTATIN 20 MG
TABLET ORAL
Qty: 90 TABLET | Refills: 3 | Status: SHIPPED | OUTPATIENT
Start: 2018-12-06 | End: 2018-12-10 | Stop reason: SDUPTHER

## 2018-12-08 DIAGNOSIS — E78.5 HYPERLIPIDEMIA: ICD-10-CM

## 2018-12-08 RX ORDER — DULOXETIN HYDROCHLORIDE 60 MG/1
CAPSULE, DELAYED RELEASE ORAL
Qty: 150 CAPSULE | Refills: 0 | Status: CANCELLED | OUTPATIENT
Start: 2018-12-08

## 2018-12-10 DIAGNOSIS — M19.90 OSTEOARTHRITIS, UNSPECIFIED OSTEOARTHRITIS TYPE, UNSPECIFIED SITE: ICD-10-CM

## 2018-12-10 DIAGNOSIS — E78.5 HYPERLIPIDEMIA, UNSPECIFIED HYPERLIPIDEMIA TYPE: ICD-10-CM

## 2018-12-10 DIAGNOSIS — R60.9 PERIPHERAL EDEMA: ICD-10-CM

## 2018-12-10 DIAGNOSIS — I10 ESSENTIAL HYPERTENSION: ICD-10-CM

## 2018-12-10 RX ORDER — DULOXETIN HYDROCHLORIDE 60 MG/1
CAPSULE, DELAYED RELEASE ORAL
Qty: 90 CAPSULE | Refills: 3 | Status: SHIPPED | OUTPATIENT
Start: 2018-12-10 | End: 2019-12-06 | Stop reason: ALTCHOICE

## 2018-12-10 RX ORDER — FUROSEMIDE 40 MG/1
40 TABLET ORAL DAILY
Qty: 90 TABLET | Refills: 3 | Status: SHIPPED | OUTPATIENT
Start: 2018-12-10 | End: 2020-03-06

## 2018-12-10 RX ORDER — LANCETS 30 GAUGE
1 EACH MISCELLANEOUS 2 TIMES DAILY
Qty: 300 EACH | Refills: 1 | Status: SHIPPED | OUTPATIENT
Start: 2018-12-10 | End: 2021-08-25 | Stop reason: ALTCHOICE

## 2018-12-10 RX ORDER — POTASSIUM CHLORIDE 20 MEQ/1
20 TABLET, EXTENDED RELEASE ORAL DAILY
Qty: 90 TABLET | Refills: 3 | Status: SHIPPED | OUTPATIENT
Start: 2018-12-10 | End: 2019-12-06 | Stop reason: SINTOL

## 2018-12-10 RX ORDER — NABUMETONE 500 MG/1
500 TABLET, FILM COATED ORAL 2 TIMES DAILY
Qty: 180 TABLET | Refills: 3 | Status: SHIPPED | OUTPATIENT
Start: 2018-12-10 | End: 2019-12-06 | Stop reason: SINTOL

## 2018-12-10 RX ORDER — SIMVASTATIN 20 MG
TABLET ORAL
Qty: 90 TABLET | Refills: 3 | Status: SHIPPED | OUTPATIENT
Start: 2018-12-10 | End: 2020-04-27

## 2018-12-10 RX ORDER — LOSARTAN POTASSIUM 50 MG/1
TABLET ORAL
Qty: 90 TABLET | Refills: 3 | Status: SHIPPED | OUTPATIENT
Start: 2018-12-10 | End: 2019-12-06 | Stop reason: SINTOL

## 2018-12-10 RX ORDER — GLUCOSAMINE HCL/CHONDROITIN SU 500-400 MG
CAPSULE ORAL
Qty: 100 STRIP | Refills: 0 | Status: SHIPPED | OUTPATIENT
Start: 2018-12-10 | End: 2018-12-13 | Stop reason: SDUPTHER

## 2018-12-10 NOTE — TELEPHONE ENCOUNTER
Patient called and said her mail order pharmacy no longer takes her insurance so she needs her meds called into [address].

## 2018-12-11 RX ORDER — SIMVASTATIN 20 MG
TABLET ORAL
Qty: 90 TABLET | Refills: 3 | Status: SHIPPED | OUTPATIENT
Start: 2018-12-11 | End: 2019-02-19 | Stop reason: SDUPTHER

## 2018-12-12 ENCOUNTER — TELEPHONE (OUTPATIENT)
Dept: FAMILY MEDICINE CLINIC | Age: 53
End: 2018-12-12

## 2018-12-13 RX ORDER — GLUCOSAMINE HCL/CHONDROITIN SU 500-400 MG
CAPSULE ORAL
Qty: 100 STRIP | Refills: 0 | Status: SHIPPED | OUTPATIENT
Start: 2018-12-13 | End: 2019-05-04 | Stop reason: SDUPTHER

## 2019-02-14 ENCOUNTER — HOSPITAL ENCOUNTER (OUTPATIENT)
Dept: GENERAL RADIOLOGY | Age: 54
Discharge: HOME | End: 2019-02-14
Payer: COMMERCIAL

## 2019-02-14 ENCOUNTER — OFFICE VISIT (OUTPATIENT)
Dept: FAMILY MEDICINE CLINIC | Age: 54
End: 2019-02-14
Payer: MEDICARE

## 2019-02-14 VITALS
BODY MASS INDEX: 39.42 KG/M2 | HEART RATE: 90 BPM | DIASTOLIC BLOOD PRESSURE: 78 MMHG | SYSTOLIC BLOOD PRESSURE: 136 MMHG | OXYGEN SATURATION: 99 % | WEIGHT: 219 LBS | TEMPERATURE: 97.9 F | RESPIRATION RATE: 20 BRPM

## 2019-02-14 DIAGNOSIS — R10.30 LOWER ABDOMINAL PAIN: ICD-10-CM

## 2019-02-14 DIAGNOSIS — L67.9 HAIR CHANGES: ICD-10-CM

## 2019-02-14 DIAGNOSIS — M54.41 ACUTE BILATERAL LOW BACK PAIN WITH RIGHT-SIDED SCIATICA: ICD-10-CM

## 2019-02-14 DIAGNOSIS — M25.551 ACUTE HIP PAIN, RIGHT: ICD-10-CM

## 2019-02-14 DIAGNOSIS — H10.32 ACUTE BACTERIAL CONJUNCTIVITIS OF LEFT EYE: Primary | ICD-10-CM

## 2019-02-14 LAB
BASOPHILS ABSOLUTE: 0.1 K/UL (ref 0–0.2)
BASOPHILS RELATIVE PERCENT: 0.7 % (ref 0–1)
BILIRUBIN URINE: NEGATIVE
BLOOD, URINE: NEGATIVE
CALCIUM SERPL-MCNC: 9.7 MG/DL (ref 8.6–10)
CLARITY: ABNORMAL
COLOR: YELLOW
EOSINOPHILS ABSOLUTE: 0.7 K/UL (ref 0–0.6)
EOSINOPHILS RELATIVE PERCENT: 6.1 % (ref 0–5)
GLUCOSE URINE: NEGATIVE MG/DL
HCT VFR BLD CALC: 45.3 % (ref 37–47)
HEMOGLOBIN: 14.6 G/DL (ref 12–16)
KETONES, URINE: NEGATIVE MG/DL
LEUKOCYTE ESTERASE, URINE: NEGATIVE
LYMPHOCYTES ABSOLUTE: 3.1 K/UL (ref 1.1–4.5)
LYMPHOCYTES RELATIVE PERCENT: 26.7 % (ref 20–40)
MCH RBC QN AUTO: 30 PG (ref 27–31)
MCHC RBC AUTO-ENTMCNC: 32.2 G/DL (ref 33–37)
MCV RBC AUTO: 93 FL (ref 81–99)
MONOCYTES ABSOLUTE: 0.6 K/UL (ref 0–0.9)
MONOCYTES RELATIVE PERCENT: 5.4 % (ref 0–10)
NEUTROPHILS ABSOLUTE: 7 K/UL (ref 1.5–7.5)
NEUTROPHILS RELATIVE PERCENT: 60.6 % (ref 50–65)
NITRITE, URINE: NEGATIVE
PDW BLD-RTO: 13 % (ref 11.5–14.5)
PH UA: 5.5
PLATELET # BLD: 307 K/UL (ref 130–400)
PMV BLD AUTO: 9.8 FL (ref 9.4–12.3)
PROTEIN UA: NEGATIVE MG/DL
RBC # BLD: 4.87 M/UL (ref 4.2–5.4)
SPECIFIC GRAVITY UA: 1.01
T4 FREE: 1.4 NG/DL (ref 0.9–1.7)
TSH SERPL DL<=0.05 MIU/L-ACNC: 2.45 UIU/ML (ref 0.27–4.2)
URINE REFLEX TO CULTURE: ABNORMAL
URINE TYPE: ABNORMAL
UROBILINOGEN, URINE: 0.2 E.U./DL
VITAMIN D 25-HYDROXY: 41.1 NG/ML
WBC # BLD: 11.6 K/UL (ref 4.8–10.8)

## 2019-02-14 PROCEDURE — 3017F COLORECTAL CA SCREEN DOC REV: CPT | Performed by: NURSE PRACTITIONER

## 2019-02-14 PROCEDURE — G8427 DOCREV CUR MEDS BY ELIG CLIN: HCPCS | Performed by: NURSE PRACTITIONER

## 2019-02-14 PROCEDURE — 4004F PT TOBACCO SCREEN RCVD TLK: CPT | Performed by: NURSE PRACTITIONER

## 2019-02-14 PROCEDURE — G8417 CALC BMI ABV UP PARAM F/U: HCPCS | Performed by: NURSE PRACTITIONER

## 2019-02-14 PROCEDURE — G8484 FLU IMMUNIZE NO ADMIN: HCPCS | Performed by: NURSE PRACTITIONER

## 2019-02-14 PROCEDURE — 72100 X-RAY EXAM L-S SPINE 2/3 VWS: CPT

## 2019-02-14 PROCEDURE — 73502 X-RAY EXAM HIP UNI 2-3 VIEWS: CPT

## 2019-02-14 PROCEDURE — 99214 OFFICE O/P EST MOD 30 MIN: CPT | Performed by: NURSE PRACTITIONER

## 2019-02-14 RX ORDER — TIZANIDINE 4 MG/1
4 TABLET ORAL EVERY 8 HOURS PRN
Qty: 30 TABLET | Refills: 0 | Status: SHIPPED | OUTPATIENT
Start: 2019-02-14 | End: 2019-02-19 | Stop reason: SDUPTHER

## 2019-02-14 RX ORDER — POLYMYXIN B SULFATE AND TRIMETHOPRIM 1; 10000 MG/ML; [USP'U]/ML
1 SOLUTION OPHTHALMIC EVERY 6 HOURS
Qty: 1 BOTTLE | Refills: 0 | Status: SHIPPED | OUTPATIENT
Start: 2019-02-14 | End: 2019-02-24

## 2019-02-14 ASSESSMENT — PATIENT HEALTH QUESTIONNAIRE - PHQ9
1. LITTLE INTEREST OR PLEASURE IN DOING THINGS: 0
SUM OF ALL RESPONSES TO PHQ QUESTIONS 1-9: 0
2. FEELING DOWN, DEPRESSED OR HOPELESS: 0
SUM OF ALL RESPONSES TO PHQ QUESTIONS 1-9: 0
1. LITTLE INTEREST OR PLEASURE IN DOING THINGS: 0
SUM OF ALL RESPONSES TO PHQ9 QUESTIONS 1 & 2: 0

## 2019-02-14 ASSESSMENT — ENCOUNTER SYMPTOMS
EYE REDNESS: 1
BACK PAIN: 1
NAUSEA: 0
ABDOMINAL PAIN: 1
DIARRHEA: 0

## 2019-02-15 DIAGNOSIS — Z79.899 MEDICATION MANAGEMENT: Primary | ICD-10-CM

## 2019-02-18 ENCOUNTER — TELEPHONE (OUTPATIENT)
Dept: FAMILY MEDICINE CLINIC | Age: 54
End: 2019-02-18

## 2019-02-19 ENCOUNTER — OFFICE VISIT (OUTPATIENT)
Dept: FAMILY MEDICINE CLINIC | Age: 54
End: 2019-02-19
Payer: MEDICARE

## 2019-02-19 VITALS
SYSTOLIC BLOOD PRESSURE: 120 MMHG | BODY MASS INDEX: 39.96 KG/M2 | TEMPERATURE: 97.9 F | OXYGEN SATURATION: 96 % | HEART RATE: 91 BPM | WEIGHT: 222 LBS | DIASTOLIC BLOOD PRESSURE: 78 MMHG

## 2019-02-19 DIAGNOSIS — M25.551 PAIN OF RIGHT HIP JOINT: ICD-10-CM

## 2019-02-19 DIAGNOSIS — M54.41 CHRONIC RIGHT-SIDED LOW BACK PAIN WITH RIGHT-SIDED SCIATICA: ICD-10-CM

## 2019-02-19 DIAGNOSIS — G89.29 CHRONIC RIGHT-SIDED LOW BACK PAIN WITH RIGHT-SIDED SCIATICA: ICD-10-CM

## 2019-02-19 LAB
ALBUMIN SERPL-MCNC: 4.9 G/DL (ref 3.5–5.2)
ALP BLD-CCNC: 101 U/L (ref 35–104)
ALT SERPL-CCNC: 23 U/L (ref 5–33)
ANION GAP SERPL CALCULATED.3IONS-SCNC: 21 MMOL/L (ref 7–19)
AST SERPL-CCNC: 14 U/L (ref 5–32)
BILIRUB SERPL-MCNC: 0.4 MG/DL (ref 0.2–1.2)
BILIRUBIN URINE: NEGATIVE
BLOOD, URINE: NEGATIVE
BUN BLDV-MCNC: 16 MG/DL (ref 6–20)
CALCIUM SERPL-MCNC: 10 MG/DL (ref 8.6–10)
CHLORIDE BLD-SCNC: 102 MMOL/L (ref 98–111)
CHOLESTEROL, TOTAL: 163 MG/DL (ref 160–199)
CLARITY: CLEAR
CO2: 23 MMOL/L (ref 22–29)
COLOR: YELLOW
CREAT SERPL-MCNC: 0.7 MG/DL (ref 0.5–0.9)
GFR NON-AFRICAN AMERICAN: >60
GLUCOSE BLD-MCNC: 134 MG/DL (ref 74–109)
GLUCOSE URINE: NEGATIVE MG/DL
HBA1C MFR BLD: 7 % (ref 4–6)
HCT VFR BLD CALC: 46.7 % (ref 37–47)
HDLC SERPL-MCNC: 47 MG/DL (ref 65–121)
HEMOGLOBIN: 14.6 G/DL (ref 12–16)
KETONES, URINE: NEGATIVE MG/DL
LDL CHOLESTEROL CALCULATED: 59 MG/DL
LEUKOCYTE ESTERASE, URINE: NEGATIVE
MCH RBC QN AUTO: 29.5 PG (ref 27–31)
MCHC RBC AUTO-ENTMCNC: 31.3 G/DL (ref 33–37)
MCV RBC AUTO: 94.3 FL (ref 81–99)
MICROALBUMIN UR-MCNC: <1.2 MG/DL (ref 0–19)
NITRITE, URINE: NEGATIVE
PDW BLD-RTO: 12.9 % (ref 11.5–14.5)
PH UA: 6
PLATELET # BLD: 315 K/UL (ref 130–400)
PMV BLD AUTO: 9.8 FL (ref 9.4–12.3)
POTASSIUM SERPL-SCNC: 4.8 MMOL/L (ref 3.5–5)
PROTEIN UA: NEGATIVE MG/DL
RBC # BLD: 4.95 M/UL (ref 4.2–5.4)
SODIUM BLD-SCNC: 146 MMOL/L (ref 136–145)
SPECIFIC GRAVITY UA: 1.02
TOTAL PROTEIN: 7.7 G/DL (ref 6.6–8.7)
TRIGL SERPL-MCNC: 283 MG/DL (ref 0–149)
UROBILINOGEN, URINE: 0.2 E.U./DL
WBC # BLD: 11.1 K/UL (ref 4.8–10.8)

## 2019-02-19 PROCEDURE — 99214 OFFICE O/P EST MOD 30 MIN: CPT | Performed by: FAMILY MEDICINE

## 2019-02-19 PROCEDURE — 4004F PT TOBACCO SCREEN RCVD TLK: CPT | Performed by: FAMILY MEDICINE

## 2019-02-19 PROCEDURE — 2022F DILAT RTA XM EVC RTNOPTHY: CPT | Performed by: FAMILY MEDICINE

## 2019-02-19 PROCEDURE — G8484 FLU IMMUNIZE NO ADMIN: HCPCS | Performed by: FAMILY MEDICINE

## 2019-02-19 PROCEDURE — G8417 CALC BMI ABV UP PARAM F/U: HCPCS | Performed by: FAMILY MEDICINE

## 2019-02-19 PROCEDURE — G8427 DOCREV CUR MEDS BY ELIG CLIN: HCPCS | Performed by: FAMILY MEDICINE

## 2019-02-19 PROCEDURE — 3046F HEMOGLOBIN A1C LEVEL >9.0%: CPT | Performed by: FAMILY MEDICINE

## 2019-02-19 PROCEDURE — 3017F COLORECTAL CA SCREEN DOC REV: CPT | Performed by: FAMILY MEDICINE

## 2019-02-19 PROCEDURE — 96372 THER/PROPH/DIAG INJ SC/IM: CPT | Performed by: FAMILY MEDICINE

## 2019-02-19 RX ORDER — TIZANIDINE 4 MG/1
4 TABLET ORAL EVERY 8 HOURS PRN
Qty: 90 TABLET | Refills: 5 | Status: SHIPPED | OUTPATIENT
Start: 2019-02-19

## 2019-02-19 RX ORDER — DEXAMETHASONE SODIUM PHOSPHATE 4 MG/ML
4 INJECTION, SOLUTION INTRA-ARTICULAR; INTRALESIONAL; INTRAMUSCULAR; INTRAVENOUS; SOFT TISSUE ONCE
Status: COMPLETED | OUTPATIENT
Start: 2019-02-19 | End: 2019-02-19

## 2019-02-19 RX ORDER — TRIAMCINOLONE ACETONIDE 40 MG/ML
40 INJECTION, SUSPENSION INTRA-ARTICULAR; INTRAMUSCULAR ONCE
Status: COMPLETED | OUTPATIENT
Start: 2019-02-19 | End: 2019-02-19

## 2019-02-19 RX ADMIN — TRIAMCINOLONE ACETONIDE 40 MG: 40 INJECTION, SUSPENSION INTRA-ARTICULAR; INTRAMUSCULAR at 11:00

## 2019-02-19 RX ADMIN — DEXAMETHASONE SODIUM PHOSPHATE 4 MG: 4 INJECTION, SOLUTION INTRA-ARTICULAR; INTRALESIONAL; INTRAMUSCULAR; INTRAVENOUS; SOFT TISSUE at 10:59

## 2019-02-19 ASSESSMENT — ENCOUNTER SYMPTOMS
ALLERGIC/IMMUNOLOGIC NEGATIVE: 1
GASTROINTESTINAL NEGATIVE: 1
BACK PAIN: 1
EYES NEGATIVE: 1
RESPIRATORY NEGATIVE: 1

## 2019-02-21 ENCOUNTER — HOSPITAL ENCOUNTER (OUTPATIENT)
Dept: GENERAL RADIOLOGY | Age: 54
Discharge: HOME | End: 2019-02-21
Payer: MEDICARE

## 2019-02-21 DIAGNOSIS — R10.30 LOWER ABDOMINAL PAIN: ICD-10-CM

## 2019-02-21 PROCEDURE — 74176 CT ABD & PELVIS W/O CONTRAST: CPT

## 2019-02-26 DIAGNOSIS — N28.1 RENAL CYST, LEFT: Primary | ICD-10-CM

## 2019-03-05 ENCOUNTER — HOSPITAL ENCOUNTER (OUTPATIENT)
Dept: GENERAL RADIOLOGY | Age: 54
Discharge: HOME | End: 2019-03-05
Payer: COMMERCIAL

## 2019-03-05 DIAGNOSIS — N28.1 RENAL CYST, LEFT: ICD-10-CM

## 2019-03-05 PROCEDURE — 76770 US EXAM ABDO BACK WALL COMP: CPT

## 2019-03-08 ENCOUNTER — TELEPHONE (OUTPATIENT)
Dept: FAMILY MEDICINE CLINIC | Age: 54
End: 2019-03-08

## 2019-03-11 DIAGNOSIS — N28.1 RENAL CYST: Primary | ICD-10-CM

## 2019-03-11 DIAGNOSIS — T50.905A ADVERSE EFFECT OF DRUG, INITIAL ENCOUNTER: Primary | ICD-10-CM

## 2019-03-11 PROCEDURE — 80047 BASIC METABLC PNL IONIZED CA: CPT | Performed by: FAMILY MEDICINE

## 2019-03-14 ENCOUNTER — HOSPITAL ENCOUNTER (OUTPATIENT)
Dept: GENERAL RADIOLOGY | Age: 54
Discharge: HOME | End: 2019-03-14
Payer: COMMERCIAL

## 2019-03-14 ENCOUNTER — TELEPHONE (OUTPATIENT)
Dept: FAMILY MEDICINE CLINIC | Age: 54
End: 2019-03-14

## 2019-03-14 DIAGNOSIS — N28.89 NODULE OF KIDNEY: Primary | ICD-10-CM

## 2019-03-14 DIAGNOSIS — N28.1 RENAL CYST: ICD-10-CM

## 2019-03-14 PROCEDURE — 74178 CT ABD&PLV WO CNTR FLWD CNTR: CPT

## 2019-03-14 PROCEDURE — 6360000004 HC RX CONTRAST MEDICATION: Performed by: NURSE PRACTITIONER

## 2019-03-14 RX ADMIN — IOPAMIDOL 75 ML: 755 INJECTION, SOLUTION INTRAVENOUS at 09:36

## 2019-03-15 DIAGNOSIS — G89.29 CHRONIC LOW BACK PAIN, UNSPECIFIED BACK PAIN LATERALITY, WITH SCIATICA PRESENCE UNSPECIFIED: Primary | ICD-10-CM

## 2019-03-15 DIAGNOSIS — S32.050A COMPRESSION FRACTURE OF L5 LUMBAR VERTEBRA, CLOSED, INITIAL ENCOUNTER (HCC): ICD-10-CM

## 2019-03-15 DIAGNOSIS — M54.5 CHRONIC LOW BACK PAIN, UNSPECIFIED BACK PAIN LATERALITY, WITH SCIATICA PRESENCE UNSPECIFIED: Primary | ICD-10-CM

## 2019-05-04 RX ORDER — GLUCOSAMINE HCL/CHONDROITIN SU 500-400 MG
1 CAPSULE ORAL
Qty: 360 STRIP | Refills: 0 | Status: SHIPPED | OUTPATIENT
Start: 2019-05-04 | End: 2021-08-25

## 2019-05-08 ENCOUNTER — HOSPITAL ENCOUNTER (OUTPATIENT)
Age: 54
Setting detail: SPECIMEN
Discharge: HOME | End: 2019-05-08
Payer: COMMERCIAL

## 2019-05-08 PROCEDURE — 88311 DECALCIFY TISSUE: CPT

## 2019-05-08 PROCEDURE — 88307 TISSUE EXAM BY PATHOLOGIST: CPT

## 2019-06-11 DIAGNOSIS — D89.89 LIGHT CHAIN DISEASE, KAPPA TYPE (HCC): Primary | ICD-10-CM

## 2019-06-12 DIAGNOSIS — D89.89 LIGHT CHAIN DISEASE, KAPPA TYPE (HCC): ICD-10-CM

## 2019-06-12 LAB
ALBUMIN SERPL-MCNC: 4.2 G/DL (ref 3.5–5.2)
ALP BLD-CCNC: 64 U/L (ref 35–104)
ALT SERPL-CCNC: 16 U/L (ref 5–33)
ANION GAP SERPL CALCULATED.3IONS-SCNC: 14 MMOL/L (ref 7–19)
AST SERPL-CCNC: 11 U/L (ref 5–32)
BILIRUB SERPL-MCNC: 0.4 MG/DL (ref 0.2–1.2)
BUN BLDV-MCNC: 17 MG/DL (ref 6–20)
CALCIUM SERPL-MCNC: 9.2 MG/DL (ref 8.6–10)
CHLORIDE BLD-SCNC: 105 MMOL/L (ref 98–111)
CO2: 25 MMOL/L (ref 22–29)
CREAT SERPL-MCNC: 0.5 MG/DL (ref 0.5–0.9)
GFR NON-AFRICAN AMERICAN: >60
GLUCOSE BLD-MCNC: 154 MG/DL (ref 74–109)
HCT VFR BLD CALC: 43.4 % (ref 37–47)
HEMOGLOBIN: 13.8 G/DL (ref 12–16)
MCH RBC QN AUTO: 30.4 PG (ref 27–31)
MCHC RBC AUTO-ENTMCNC: 31.8 G/DL (ref 33–37)
MCV RBC AUTO: 95.6 FL (ref 81–99)
PDW BLD-RTO: 13.2 % (ref 11.5–14.5)
PLATELET # BLD: 245 K/UL (ref 130–400)
PMV BLD AUTO: 10 FL (ref 9.4–12.3)
POTASSIUM SERPL-SCNC: 4.3 MMOL/L (ref 3.5–5)
RBC # BLD: 4.54 M/UL (ref 4.2–5.4)
SEDIMENTATION RATE, ERYTHROCYTE: 11 MM/HR (ref 0–25)
SODIUM BLD-SCNC: 144 MMOL/L (ref 136–145)
TOTAL PROTEIN: 6.7 G/DL (ref 6.6–8.7)
WBC # BLD: 6.7 K/UL (ref 4.8–10.8)

## 2019-06-15 LAB
ALBUMIN SERPL-MCNC: 4.47 G/DL (ref 3.75–5.01)
ALPHA-1-GLOBULIN: 0.32 G/DL (ref 0.19–0.46)
ALPHA-2-GLOBULIN: 0.96 G/DL (ref 0.48–1.05)
BETA GLOBULIN: 0.9 G/DL (ref 0.48–1.1)
GAMMA GLOBULIN: 0.96 G/DL (ref 0.62–1.51)
PROTEIN ELECTROPHORESIS, SERUM: NORMAL
SPE/IFE INTERPRETATION: NORMAL
TOTAL PROTEIN: 7.6 G/DL (ref 6–8.3)

## 2019-06-20 DIAGNOSIS — R89.7 ABNORMAL BIOPSY RESULT: Primary | ICD-10-CM

## 2019-07-01 ENCOUNTER — HOSPITAL ENCOUNTER (OUTPATIENT)
Dept: INFUSION THERAPY | Age: 54
Discharge: HOME | End: 2019-07-01
Payer: MEDICARE

## 2019-07-01 PROCEDURE — 36415 COLL VENOUS BLD VENIPUNCTURE: CPT

## 2019-07-01 PROCEDURE — 83615 LACTATE (LD) (LDH) ENZYME: CPT

## 2019-07-01 PROCEDURE — 84550 ASSAY OF BLOOD/URIC ACID: CPT

## 2019-07-01 PROCEDURE — 85025 COMPLETE CBC W/AUTO DIFF WBC: CPT

## 2019-07-01 PROCEDURE — 80053 COMPREHEN METABOLIC PANEL: CPT

## 2019-07-02 ENCOUNTER — HOSPITAL ENCOUNTER (OUTPATIENT)
Dept: INFUSION THERAPY | Age: 54
Discharge: HOME | End: 2019-07-02
Payer: MEDICARE

## 2019-07-02 PROCEDURE — 2500000003 HC RX 250 WO HCPCS: Performed by: NURSE PRACTITIONER

## 2019-07-02 PROCEDURE — 85025 COMPLETE CBC W/AUTO DIFF WBC: CPT

## 2019-07-02 PROCEDURE — 38222 DX BONE MARROW BX & ASPIR: CPT

## 2019-07-02 RX ORDER — LIDOCAINE HYDROCHLORIDE 20 MG/ML
20 INJECTION, SOLUTION INFILTRATION; PERINEURAL ONCE
Status: DISCONTINUED | OUTPATIENT
Start: 2019-07-02 | End: 2019-07-04 | Stop reason: HOSPADM

## 2019-07-12 ENCOUNTER — HOSPITAL ENCOUNTER (OUTPATIENT)
Dept: INFUSION THERAPY | Age: 54
Discharge: HOME | End: 2019-07-12
Payer: MEDICARE

## 2019-07-12 PROCEDURE — 85025 COMPLETE CBC W/AUTO DIFF WBC: CPT

## 2019-07-23 ENCOUNTER — HOSPITAL ENCOUNTER (OUTPATIENT)
Dept: NUCLEAR MEDICINE | Age: 54
Discharge: HOME | End: 2019-07-25
Payer: COMMERCIAL

## 2019-07-23 DIAGNOSIS — C90.00 MULTIPLE MYELOMA NOT HAVING ACHIEVED REMISSION (HCC): ICD-10-CM

## 2019-07-23 LAB
GLUCOSE BLD-MCNC: 114 MG/DL (ref 70–99)
PERFORMED ON: ABNORMAL

## 2019-07-23 PROCEDURE — 82948 REAGENT STRIP/BLOOD GLUCOSE: CPT

## 2019-07-23 PROCEDURE — 78813 PET IMAGE FULL BODY: CPT

## 2019-07-23 PROCEDURE — 3430000000 HC RX DIAGNOSTIC RADIOPHARMACEUTICAL: Performed by: INTERNAL MEDICINE

## 2019-07-23 PROCEDURE — A9552 F18 FDG: HCPCS | Performed by: INTERNAL MEDICINE

## 2019-07-23 RX ORDER — FLUDEOXYGLUCOSE F 18 200 MCI/ML
10 INJECTION, SOLUTION INTRAVENOUS
Status: COMPLETED | OUTPATIENT
Start: 2019-07-23 | End: 2019-07-23

## 2019-07-23 RX ADMIN — FLUDEOXYGLUCOSE F 18 10 MILLICURIE: 200 INJECTION, SOLUTION INTRAVENOUS at 13:30

## 2019-07-29 ENCOUNTER — HOSPITAL ENCOUNTER (EMERGENCY)
Age: 54
Discharge: HOME | End: 2019-07-29
Payer: MEDICARE

## 2019-07-29 ENCOUNTER — APPOINTMENT (OUTPATIENT)
Dept: GENERAL RADIOLOGY | Age: 54
End: 2019-07-29
Payer: MEDICARE

## 2019-07-29 VITALS
HEART RATE: 82 BPM | RESPIRATION RATE: 17 BRPM | OXYGEN SATURATION: 96 % | TEMPERATURE: 97.6 F | SYSTOLIC BLOOD PRESSURE: 133 MMHG | DIASTOLIC BLOOD PRESSURE: 82 MMHG

## 2019-07-29 DIAGNOSIS — G89.29 ACUTE EXACERBATION OF CHRONIC LOW BACK PAIN: Primary | ICD-10-CM

## 2019-07-29 DIAGNOSIS — M54.50 ACUTE EXACERBATION OF CHRONIC LOW BACK PAIN: Primary | ICD-10-CM

## 2019-07-29 PROCEDURE — 96372 THER/PROPH/DIAG INJ SC/IM: CPT

## 2019-07-29 PROCEDURE — 96374 THER/PROPH/DIAG INJ IV PUSH: CPT

## 2019-07-29 PROCEDURE — 99283 EMERGENCY DEPT VISIT LOW MDM: CPT | Performed by: PHYSICIAN ASSISTANT

## 2019-07-29 PROCEDURE — 6360000002 HC RX W HCPCS: Performed by: PHYSICIAN ASSISTANT

## 2019-07-29 PROCEDURE — 96375 TX/PRO/DX INJ NEW DRUG ADDON: CPT

## 2019-07-29 PROCEDURE — 72100 X-RAY EXAM L-S SPINE 2/3 VWS: CPT

## 2019-07-29 PROCEDURE — 99283 EMERGENCY DEPT VISIT LOW MDM: CPT

## 2019-07-29 RX ORDER — ONDANSETRON 2 MG/ML
4 INJECTION INTRAMUSCULAR; INTRAVENOUS ONCE
Status: COMPLETED | OUTPATIENT
Start: 2019-07-29 | End: 2019-07-29

## 2019-07-29 RX ADMIN — HYDROMORPHONE HYDROCHLORIDE 1 MG: 1 INJECTION, SOLUTION INTRAMUSCULAR; INTRAVENOUS; SUBCUTANEOUS at 13:38

## 2019-07-29 RX ADMIN — ONDANSETRON 4 MG: 2 INJECTION INTRAMUSCULAR; INTRAVENOUS at 13:38

## 2019-07-29 RX ADMIN — HYDROMORPHONE HYDROCHLORIDE 1 MG: 1 INJECTION, SOLUTION INTRAMUSCULAR; INTRAVENOUS; SUBCUTANEOUS at 14:19

## 2019-07-29 ASSESSMENT — PAIN DESCRIPTION - PAIN TYPE: TYPE: ACUTE PAIN

## 2019-07-29 ASSESSMENT — PAIN SCALES - GENERAL
PAINLEVEL_OUTOF10: 5
PAINLEVEL_OUTOF10: 9

## 2019-07-29 ASSESSMENT — PAIN DESCRIPTION - LOCATION: LOCATION: BACK

## 2019-07-30 ASSESSMENT — ENCOUNTER SYMPTOMS
RHINORRHEA: 0
COLOR CHANGE: 0
NAUSEA: 0
BACK PAIN: 1
SORE THROAT: 0
ABDOMINAL PAIN: 0
SHORTNESS OF BREATH: 0
COUGH: 0
EYE PAIN: 0
EYE DISCHARGE: 0
ABDOMINAL DISTENTION: 0
PHOTOPHOBIA: 0
APNEA: 0

## 2019-07-30 NOTE — ED PROVIDER NOTES
MG/ML SOLN SC injection R-1, DAWHistorical Med      insulin glargine (TOUJEO SOLOSTAR) 300 UNIT/ML injection pen INJECT 75 UNITS SUB-Q ONCE NIGHTLY., Disp-10 pen, R-5Please consider 90 day supplies to promote better adherenceNormal      losartan (COZAAR) 50 MG tablet TAKE ONE TABLET BY MOUTH ONCE DAILY, Disp-90 tablet, R-3Normal      DULoxetine (CYMBALTA) 60 MG extended release capsule TAKE ONE CAPSULE BY MOUTH ONCE DAILY, Disp-90 capsule, R-3Please consider 90 day supplies to promote better adherenceNormal      simvastatin (ZOCOR) 20 MG tablet TAKE ONE TABLET BY MOUTH ONCE DAILY IN THE EVENING, Disp-90 tablet, R-3Normal      metFORMIN (GLUCOPHAGE) 1000 MG tablet TAKE ONE TABLET BY MOUTH TWICE DAILY WITH MEALS, Disp-180 tablet, R-3Please consider 90 day supplies to promote better adherenceNormal      nabumetone (RELAFEN) 500 MG tablet Take 1 tablet by mouth 2 times daily, Disp-180 tablet, R-3Normal      furosemide (LASIX) 40 MG tablet Take 1 tablet by mouth daily, Disp-90 tablet, R-3Normal      potassium chloride (KLOR-CON M) 20 MEQ extended release tablet Take 1 tablet by mouth daily, Disp-90 tablet, R-3Normal      Lancets MISC 2 TIMES DAILY Starting Mon 12/10/2018, Disp-300 each, R-1, Normal      hydrochlorothiazide (MICROZIDE) 12.5 MG capsule TAKE 1 CAPSULE BY MOUTH ONCE DAILY, Disp-90 capsule, R-5Normal      gabapentin (NEURONTIN) 600 MG tablet Take 600 mg by mouth. .Historical Med      HYDROcodone-acetaminophen (NORCO) 7.5-325 MG per tablet Take 1 tablet by mouth every 8 hours as needed for Pain. Arbutus Ring Historical Med      glucose monitoring kit (FREESTYLE) monitoring kit DAILY Starting Wed 12/27/2017, Disp-1 kit, R-0, PrintDX: Diabetes      Blood Glucose Monitoring Suppl (ACURA BLOOD GLUCOSE METER) w/Device KIT 4 TIMES DAILY AFTER MEALS AND BEFORE BEDTIME Starting Fri 11/17/2017, Disp-1 kit, R-0, Normal      BD PEN NEEDLE MACKENZIE U/F 32G X 4 MM MISC Disp-100 Package, R-5, Normal      Calcium-Vitamin D (CALTRATE 600

## 2019-08-02 ENCOUNTER — HOSPITAL ENCOUNTER (OUTPATIENT)
Dept: INFUSION THERAPY | Age: 54
Discharge: HOME | End: 2019-08-02
Payer: MEDICARE

## 2019-08-02 PROCEDURE — 85025 COMPLETE CBC W/AUTO DIFF WBC: CPT

## 2019-08-04 PROBLEM — Z71.9 ENCOUNTER FOR CONSULTATION: Status: ACTIVE | Noted: 2019-08-04

## 2019-08-04 PROBLEM — F17.200 CURRENT EVERY DAY SMOKER: Status: ACTIVE | Noted: 2019-08-04

## 2019-08-04 NOTE — PROGRESS NOTES
"[facility]  MD Ning Ramirez BSN, PA-C  ________________________________________  [facility]  Department of Radiation Oncology  [address] [place] [id number]  Office:  [phone]  Fax: [phone]    DATE:  08/05/2019    PATIENT:   Riri Denise 1965                                   MEDICAL RECORD #:  [id number]                                                       REASON FOR CONSULTATION:  Riri Denise is a very pleasant 54 y.o. 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  She reports a pain level of 7/10 today even on medication, \"I felt a sudden increase in back pain and a 'crushing' sensation in lower back, it had gone away after surgery but now it feels like it did before, I see Dr. Serra this afternoon\".  Reports some urinary incontinence since surgery (kyphoplasty), fatigue and left leg numbness.     HISTORY OF PRESENT ILLNESS  Diagnosed in July 2019 with incidental solitary plasmacytoma involving L5 vertebra causing compression fracture.  Underwent kyphoplasty on May 8, 2019 revealing a solitary extramedullary plasmacytoma.  . CRAB Criteria: Calcium 10.2, Renal/Creatinine 0.60, Anemia/Hgb 14.4, Bone Lesions: solitary extramedullary plasma cytoma without lytic or bony lesions. Follows Dr. Kacy Garcia anticipates radiation therapy and routine monitoring with serology studies    02/14/2019 - X-ray right hip:  • No visualized acute fracture or malalignment.    02/14/2019 - X-ray lumbar spine:  • No acute fracture or traumatic malalignment.  • Underlying mild scoliotic curvature.  • Advanced facet arthropathy with neuroforaminal narrowing at L4-5 and L5-S1.    02/21/2019- CT Abdomen/Pelvis:  • Probable fatty infiltration of the liver. Prior cholecystectomy.  • Probable 1.4 cm left renal cyst but is not fully characterized " without contrast.  • Probable previous colon anastomosis in the sigmoid region.  • Prior hysterectomy.  • Postsurgical changes of the anterior abdominal wall, as described above.    03/01/2019 - MRI Right Hip due to right hip/low back pain:  • Minimal cartilage attenuation at the right hip joint  • Othwerwise unremarkable MRI of the right hip with no acute osseous or soft tissue abnormality seen    03/05/2019 - Renal Ultrasound:  • Hypoechoic lesion involving the interpolar aspect of the left kidney most likely represents a cyst but does not fit all the criteria for simple cyst as it does contain some internal echoes. Either short interval follow-up ultrasound to assure stability or CT urography would be recommended. The kidneys are otherwise normal in sonographic appearance.   • The urinary bladder is evacuated.    03/14/2019 - CT Abdomen/Pelvis:  • A left renal cyst which has not changed in size or shape since 2015.  • A tiny low-density nodule in the right kidney which is too small to be further characterized. A follow-up examination in 6 months may be obtained for comparison.  • The diverticulosis of the colon. No evidence for diverticulitis.  • A prior cholecystectomy.    05/08/2019 - Kyphoplasty to the fifth lumbar vertebrae per :  Bone, L5 vertebral body, biopsy:   • Small to minute fragments of bone with markedly increased number of marrow plasma cells.  Comment: Immunohistochemical studies performed at [facility] indicate that plasma cells account for approximately 70-80% of marrow cellularity.  There is a high background on both kappa and lambda immunostains, Kappa light chain restriction is favored.  Please refer to the complete pathology report from [facility] which has been scanned into EPIC.  The findings are compatible with involvement by a plasma cell myeloma.     07/02/2019 - Bone Marrow Biopsy:  • Overall low normocellular bone marrow for age ( ~30-40%) with trilineage  hematopoiesis, no significant dyspoiesis, no increase in blasts and no atypical plasmacytosis  • No evidence of plasma cell neoplasm or lymphoproliferative disorder  • A small population of polyclonal plasma cells  • Decreased stainable storage iron; no increase in reticulin fibrosis  • Karyotypic studies pending    07/08/2019 - Bone Survey:  • Previous cervical spine surgery  • Percutaneous kyphoplasty of L5 due to an L5 fracture  • No lytic or bony expansile lesions identified    07/12/2019 - Appointment with :  • Referral to  for adjuvant XRT for plasmacytoma  • Referral to  at [facility] for second opinion  • Ordered PET Scan  • Follow up in 2 -3 weeks     07/23/2019 - PET Scan:  • Mild uptake in the L5 vertebral body with an SUV max of 3.3. There has been prior compression deformity at this location with kyphoplasty. Therefore, this uptake is indeterminate and may be postoperative in nature. There are no other areas of abnormal bone uptake. There are no lytic appearing bone lesions identified on bone window images.  • Prior sigmoid colon resection and anastomoses.  • Prior hysterectomy. Prior cholecystectomy.  • Other nonacute findings, as discussed above.    07/24/2019 - Appointment with  ([facility]):  • Patient had incidentally found solitary plasmacytoma involving L5 vertebra causing compression fracture. Bone marrow is negative for clonal plasma cell and poor outside records SPEP was negative for M protein with normal serum free light chain ratio.  • I will complete her evaluation with repeat labs including spot urine electrophoresis, quantitative immunoglobulins.   • I explain her excellent prognosis and management options including curative intent radiation therapy.   • Patient will be referred to local provider Dr. Mickey South.   • Patient will need surveillance SPEP serum free light chain every 6-12 months for monitoring. Risk of progression to multiple  myeloma is around 30-50% over 10 years. CRAB criteria explained to the patient.  • Follow up in 4 months with repeat myeloma labs.     07/29/2019 - X-ray Lumbar Spine:  **Done as work up for patient feeling acute worsening of lower back pain  • Stable compression deformity of L5 with previous kyphoplasty.  • There may be very minimal compression of the superior endplate of L3 that is new compared to the prior study. This is minimal.  • Demineralized bones. Mild scoliosis to the right. Degenerative changes, as described.    History obtained from  PATIENT, FAMILY and CHART     Patient currently denies recent changes in bowel movement or urinary patterns.  She denies buttock or inner thigh numbness.  She denies new focal neurological deficits.  She denies other complaints.    PAST MEDICAL HISTORY   Past Medical History:   Diagnosis Date   • Arthritis    • Degenerative cervical disc    • Diabetes mellitus (CMS/HCC)    • Diverticula, colon    • Hypertension    • Mitral valve prolapse    • Osteopetrosis    • Plasmacytoma (CMS/HCC)    • PONV (postoperative nausea and vomiting)     only when has demerol       PAST SURGICAL HISTORY   Past Surgical History:   Procedure Laterality Date   • ANKLE SURGERY Right     X2   • APPENDECTOMY     • CERVICAL FUSION     • CHOLECYSTECTOMY     • COLON RESECTION     • ENDOMETRIAL ABLATION     • FACIAL FRACTURE SURGERY     • GASTROCNEMIUS RECESSION Right 12/19/2017    Procedure: RECESSION GASTROCNEMIUS RIGHT ANKLE ;  Surgeon: Herve Bojorquez DPM;  Location:  [facility] OR;  Service:    • HERNIA REPAIR      WITH MESH   • HYSTERECTOMY     • KYPHOPLASTY     • OVARIAN CYST REMOVAL      AND OVARY REMOVAL   • ND ARTHROPLASTY ANKLE JOINT Right 12/19/2017    Procedure: TOTAL ANKLE ARTHROPLASTY WITH INFINITY IMPLANT, GASTROCNEMIUS RECESSION RIGHT ANKLE ;  Surgeon: Herve Bojorquez DPM;  Location:  [facility] OR;  Service: Podiatry   • TONSILLECTOMY     • TUBAL ABDOMINAL LIGATION        FAMILY HISTORY  family  history includes Cancer in her nephew; Colon cancer in her paternal grandmother; Leukemia in her nephew; Lung cancer in her father; Thrombocytopenia in her daughter.     SOCIAL HISTORY  Social History     Tobacco Use   • Smoking status: Current Every Day Smoker     Packs/day: 1.00     Types: Cigarettes   • Smokeless tobacco: Former User     Types: Chew   Substance Use Topics   • Alcohol use: Yes     Frequency: Never     Comment: OCASSIONAL   • Drug use: No     ALLERGIES  Adhesive tape; Demerol [meperidine]; Levemir [insulin detemir]; and Tresiba flextouch [insulin degludec]     MEDICATIONS   Current Outpatient Medications   Medication Sig Dispense Refill   • aspirin 81 MG EC tablet Take 81 mg by mouth Daily.     • calcium carbonate (OS-AVELINO) 600 MG tablet Take 600 mg by mouth Daily.     • Cholecalciferol (VITAMIN D3) 2000 units tablet Take 1 capsule by mouth Daily.     • denosumab (PROLIA) 60 MG/ML solution syringe Inject 60 mg under the skin 1 (One) Time. EVERY 6 MONTHS     • DULoxetine (CYMBALTA) 60 MG capsule Take 60 mg by mouth Daily.     • furosemide (LASIX) 40 MG tablet Take 40 mg by mouth Daily.     • Insulin Glargine (TOUJEO SOLOSTAR SC) Inject 80 Units under the skin Daily.     • losartan (COZAAR) 50 MG tablet Take 50 mg by mouth Daily.     • metFORMIN (GLUCOPHAGE) 1000 MG tablet Take 1,000 mg by mouth 2 (Two) Times a Day With Meals.     • methylPREDNISolone (MEDROL, BRICE, PO) Take  by mouth.     • Multiple Vitamin (MULTI VITAMIN DAILY PO) Take 1 capsule by mouth Daily.     • NABUMETONE PO Take 500 mg by mouth 2 (Two) Times a Day.     • oxyCODONE-acetaminophen (PERCOCET) 5-325 MG per tablet Take 1 tablet by mouth Every 6 (Six) Hours As Needed.     • simvastatin (ZOCOR) 20 MG tablet Take 20 mg by mouth Every Night.     • tiZANidine (ZANAFLEX) 4 MG tablet Take 4 mg by mouth Every 8 (Eight) Hours As Needed for Muscle Spasms.     • HYDROcodone-acetaminophen (NORCO) 7.5-325 MG per tablet Take 1 tablet by mouth  "Every 8 (Eight) Hours As Needed for Moderate Pain .       No current facility-administered medications for this visit.       The following portions of the patient's history were reviewed and updated as appropriate: allergies, current medications, past family history, past medical history, past social history, past surgical history and problem list.    REVIEW OF SYSTEMS  Review of Systems   Constitutional: Positive for appetite change (decrease), fatigue and unexpected weight change (decreased 20 lbs over last 2 months).   HENT: Negative.    Eyes:        Glasses   Respiratory: Negative.    Cardiovascular: Negative.    Gastrointestinal: Positive for constipation (uses Miralax prn).   Endocrine: Negative.    Genitourinary: Positive for frequency (d/t lasix).        Occasional incontinence since surgery     Musculoskeletal: Positive for back pain.   Skin: Negative.    Allergic/Immunologic: Negative.    Neurological: Positive for numbness (right lower extremity).   Hematological: Negative.    Psychiatric/Behavioral: Negative.      PHYSICAL EXAM  VITAL SIGNS:   Vitals:    08/05/19 0923   BP: 132/74   Weight: 95.7 kg (211 lb)   Height: 158.8 cm (62.5\")   PainSc:   7   PainLoc: Back  Comment: low back      General Appearance:  awake, alert, oriented, in obvious discomfort while sitting in chair but in no distress. Cooperative.   Head: Normocephalic  Eyes: Conjunctiva pink, pupils equal and reactive.   Ears:  Normal externally.  Hearing- normal to conversation  Nose/Sinuses:  Mucosa normal. No drainage or sinus tenderness.  Mouth/Throat:  Mucosa moist, no lesions; pharynx without erythema, edema or exudate.  Neck: Supple, no mass, non-tender  Back:  Symmetric, no curvature, ROM normal, no CVA tenderness   Lungs:  Normal expansion.  Clear to auscultation.  No rales, rhonchi, or wheezing.  Heart:  Heart sounds are normal.  Regular rate and rhythm without murmur, gallop or rub.  Abdomen:  Soft, non-tender, normal bowel sounds; " no bruits, organomegaly or masses.  Extremities: Warm to touch, pink, with no edema. Pulses 2+ bilaterally  Musculoskeletal: strength and sensation grossly normal  Neurologic:  Alert and oriented, gait normal  Psych exam: normal situational behavior   Skin:  Warm and moist. No suspicious lesions or rashes of concern     Performance Status: ECOG (1) Restricted in physically strenuous activity, ambulatory and able to do work of light nature    Clinical Quality Measures  -Pain Documented by Standardized Tool, FPS Riri Denise reports a pain score of 7/10.  Given her pain assessment as noted, treatment options were discussed and the following options were decided upon as a follow-up plan to address the patient's pain: continuation of current treatment plan for pain.Patient is to return to neurosurgeon today for reassessment of her pain.  Currently on medrol dose pack and Percocet.   -Advanced Care Planning Care plan discussed, no care plan provided  -Body Mass Index Screening and Follow-Up Plan Patient's Body mass index is 37.98 kg/m². BMI is above normal parameters. Recommendations include: educational material.  -Tobacco Use: Screening and Cessation Intervention Social History    Tobacco Use      Smoking status: Current Every Day Smoker        Packs/day: 1.00        Types: Cigarettes      Smokeless tobacco: Former User        Types: Chew   Smoking cessation information given in after visit summary.    ASSESSMENT AND PLAN   1. Plasmacytoma of bone (CMS/HCC)    2. Status post kyphoplasty    3. Acute midline low back pain without sciatica    4. Urinary incontinence without sensory awareness    5. Current every day smoker    6. Encounter for consultation         Orders Placed This Encounter   Procedures   • CT Radiation Therapy Planning     Order Specific Question:   Reason for Exam:     Answer:   L5 Plasmacytoma     RECOMMENDATIONS: Riri Denise has been referred to our office today to discuss radiotherapy  recommendations. Diagnosed in July 2019 with incidental solitary plasmacytoma involving L5 vertebra causing compression fracture.  Underwent kyphoplasty on May 8, 2019 revealing a solitary plasma cytoma.  . CRAB Criteria: Calcium 10.2, Renal/Creatinine 0.60, Anemia/Hgb 14.4, Bone Lesions: solitary extramedullary plasma cytoma without lytic or bony lesions. Follows Dr. Kacy Garcia anticipates definitive intent radiation therapy and routine monitoring with serology studies    We have discussed the indications and rationale of definitive intent radiation therapy to L5 following kyphoplasty for the treatment of osseous solitary plasmacytoma according to the NCCN Guidelines. I have extensively reviewed the risks, benefits and alternatives of therapy and progression of disease in spite of therapy with either local or systemic failure.  I explained the intent, benefits, course, precautions, and side effects (acute skin reactions, softening of bowel movements or diarrhea, nausea, vomiting, radiation-induced kidney injury, radiation-induced bladder injury, increased risk for adhesion formation and possible small bowel obstruction, and rarely secondary malignancies) of definitive intent radiation therapy to the L5 region.  I have seen, examined and reviewed this patient's medication list, appropriate labs and imaging studies, reviewed relevant medical records and other physicians notes and discussed the goals and plans of care with the patient and family and answered all questions.     After careful consideration of the available clinical diagnostic data and extensive examination and evaluation of the patient, it is my recommendation that this patient be treated with definitive intent radiation therapy to a dose of 5040 cGy over 28 daily fractions, final course to be determined with planning.  The patient and her daughter verbalize understanding of this discussion, voices no further questions and wishes to  proceed with recommended therapy.      We will perform CT simulation tomorrow to initiate the treatment planning and notify the patient when complete to begin.     Riri Denise is a current cigarettes user.  She currently smokes 1 pack of cigarettes per day. I have educated her on the risk of diseases from using tobacco products such as cancer, COPD and heart diease. I advised her to quit and she is willing to quit. We have discussed the following method/s for tobacco cessation:  Education Material Counseling.  Together we have set a quit date for not set, she will follow her PCP for medication therapy..  She will follow up with me at time of CT simulation or sooner to check on her progress. I spent >10 minutes counseling the patient.    Todays appointment time was spent in counseling and coordination of care as follows: diagnosis, intent of treatment discussing radiation therapy specifics: logistics, possible and probable side effects and after effects, staging of cancer, standard of care in for this stage of this cancer and treatment options.  I saw this patient in consultation with Ning Love PA-C while covering for Dr. Mickey South, radiation oncologist.  Leland Rubio MD   08/05/2019

## 2019-08-05 ENCOUNTER — HOSPITAL ENCOUNTER (OUTPATIENT)
Dept: RADIATION ONCOLOGY | Facility: HOSPITAL | Age: 54
Setting detail: RADIATION/ONCOLOGY SERIES
End: 2019-08-05

## 2019-08-05 ENCOUNTER — CONSULT (OUTPATIENT)
Dept: RADIATION ONCOLOGY | Facility: HOSPITAL | Age: 54
End: 2019-08-05

## 2019-08-05 VITALS
BODY MASS INDEX: 37.39 KG/M2 | SYSTOLIC BLOOD PRESSURE: 132 MMHG | HEIGHT: 63 IN | DIASTOLIC BLOOD PRESSURE: 74 MMHG | WEIGHT: 211 LBS

## 2019-08-05 DIAGNOSIS — Z71.9 ENCOUNTER FOR CONSULTATION: ICD-10-CM

## 2019-08-05 DIAGNOSIS — C90.30 PLASMACYTOMA OF BONE (HCC): Primary | ICD-10-CM

## 2019-08-05 DIAGNOSIS — F17.200 CURRENT EVERY DAY SMOKER: ICD-10-CM

## 2019-08-05 DIAGNOSIS — Z98.890 STATUS POST KYPHOPLASTY: ICD-10-CM

## 2019-08-05 DIAGNOSIS — M54.50 ACUTE MIDLINE LOW BACK PAIN WITHOUT SCIATICA: ICD-10-CM

## 2019-08-05 DIAGNOSIS — N39.42 URINARY INCONTINENCE WITHOUT SENSORY AWARENESS: ICD-10-CM

## 2019-08-05 PROCEDURE — G0463 HOSPITAL OUTPT CLINIC VISIT: HCPCS | Performed by: RADIOLOGY

## 2019-08-05 RX ORDER — TIZANIDINE 4 MG/1
4 TABLET ORAL EVERY 8 HOURS PRN
COMMUNITY

## 2019-08-05 RX ORDER — HYDROCODONE BITARTRATE AND ACETAMINOPHEN 7.5; 325 MG/1; MG/1
1 TABLET ORAL EVERY 8 HOURS PRN
COMMUNITY
End: 2019-11-01

## 2019-08-05 RX ORDER — OXYCODONE HYDROCHLORIDE AND ACETAMINOPHEN 5; 325 MG/1; MG/1
1 TABLET ORAL EVERY 6 HOURS PRN
COMMUNITY
End: 2019-11-01

## 2019-08-05 RX ORDER — FUROSEMIDE 40 MG/1
40 TABLET ORAL DAILY
COMMUNITY
End: 2020-02-05

## 2019-08-06 ENCOUNTER — HOSPITAL ENCOUNTER (OUTPATIENT)
Dept: RADIATION ONCOLOGY | Facility: HOSPITAL | Age: 54
Discharge: HOME | End: 2019-08-06

## 2019-08-06 PROCEDURE — 77290 THER RAD SIMULAJ FIELD CPLX: CPT | Performed by: RADIOLOGY

## 2019-08-07 PROCEDURE — 77295 3-D RADIOTHERAPY PLAN: CPT | Performed by: RADIOLOGY

## 2019-08-07 PROCEDURE — 77300 RADIATION THERAPY DOSE PLAN: CPT | Performed by: RADIOLOGY

## 2019-08-07 PROCEDURE — 77334 RADIATION TREATMENT AID(S): CPT | Performed by: RADIOLOGY

## 2019-08-08 ENCOUNTER — HOSPITAL ENCOUNTER (OUTPATIENT)
Dept: RADIATION ONCOLOGY | Facility: HOSPITAL | Age: 54
Discharge: HOME | End: 2019-08-08

## 2019-08-08 PROCEDURE — 77412 RADIATION TX DELIVERY LVL 3: CPT | Performed by: RADIOLOGY

## 2019-08-08 PROCEDURE — 77417 THER RADIOLOGY PORT IMAGE(S): CPT | Performed by: RADIOLOGY

## 2019-08-09 ENCOUNTER — HOSPITAL ENCOUNTER (OUTPATIENT)
Dept: RADIATION ONCOLOGY | Facility: HOSPITAL | Age: 54
Discharge: HOME | End: 2019-08-09

## 2019-08-09 PROCEDURE — 77412 RADIATION TX DELIVERY LVL 3: CPT | Performed by: RADIOLOGY

## 2019-08-12 ENCOUNTER — HOSPITAL ENCOUNTER (OUTPATIENT)
Dept: RADIATION ONCOLOGY | Facility: HOSPITAL | Age: 54
Discharge: HOME | End: 2019-08-12

## 2019-08-12 PROCEDURE — 77412 RADIATION TX DELIVERY LVL 3: CPT | Performed by: RADIOLOGY

## 2019-08-13 ENCOUNTER — HOSPITAL ENCOUNTER (OUTPATIENT)
Dept: RADIATION ONCOLOGY | Facility: HOSPITAL | Age: 54
Discharge: HOME | End: 2019-08-13

## 2019-08-13 PROCEDURE — 77412 RADIATION TX DELIVERY LVL 3: CPT | Performed by: RADIOLOGY

## 2019-08-14 ENCOUNTER — HOSPITAL ENCOUNTER (OUTPATIENT)
Dept: RADIATION ONCOLOGY | Facility: HOSPITAL | Age: 54
Discharge: HOME | End: 2019-08-14

## 2019-08-14 PROCEDURE — 77412 RADIATION TX DELIVERY LVL 3: CPT | Performed by: RADIOLOGY

## 2019-08-14 PROCEDURE — 77336 RADIATION PHYSICS CONSULT: CPT | Performed by: RADIOLOGY

## 2019-08-15 ENCOUNTER — HOSPITAL ENCOUNTER (OUTPATIENT)
Dept: RADIATION ONCOLOGY | Facility: HOSPITAL | Age: 54
Discharge: HOME | End: 2019-08-15

## 2019-08-15 LAB
CALCIUM SERPL-MCNC: 10.9 MG/DL (ref 8.6–10)
VITAMIN D 25-HYDROXY: 38.1 NG/ML

## 2019-08-15 PROCEDURE — 77412 RADIATION TX DELIVERY LVL 3: CPT | Performed by: RADIOLOGY

## 2019-08-15 PROCEDURE — 77417 THER RADIOLOGY PORT IMAGE(S): CPT | Performed by: RADIOLOGY

## 2019-08-16 ENCOUNTER — HOSPITAL ENCOUNTER (OUTPATIENT)
Dept: RADIATION ONCOLOGY | Facility: HOSPITAL | Age: 54
Discharge: HOME | End: 2019-08-16

## 2019-08-16 PROCEDURE — 77412 RADIATION TX DELIVERY LVL 3: CPT | Performed by: RADIOLOGY

## 2019-08-19 ENCOUNTER — HOSPITAL ENCOUNTER (OUTPATIENT)
Dept: RADIATION ONCOLOGY | Facility: HOSPITAL | Age: 54
Discharge: HOME | End: 2019-08-19

## 2019-08-19 PROCEDURE — 77412 RADIATION TX DELIVERY LVL 3: CPT | Performed by: RADIOLOGY

## 2019-08-20 ENCOUNTER — HOSPITAL ENCOUNTER (OUTPATIENT)
Dept: RADIATION ONCOLOGY | Facility: HOSPITAL | Age: 54
Discharge: HOME | End: 2019-08-20

## 2019-08-20 PROCEDURE — 77412 RADIATION TX DELIVERY LVL 3: CPT | Performed by: RADIOLOGY

## 2019-08-21 ENCOUNTER — HOSPITAL ENCOUNTER (OUTPATIENT)
Dept: RADIATION ONCOLOGY | Facility: HOSPITAL | Age: 54
Discharge: HOME | End: 2019-08-21

## 2019-08-21 PROCEDURE — 77336 RADIATION PHYSICS CONSULT: CPT | Performed by: RADIOLOGY

## 2019-08-21 PROCEDURE — 77412 RADIATION TX DELIVERY LVL 3: CPT | Performed by: RADIOLOGY

## 2019-08-22 PROCEDURE — 77412 RADIATION TX DELIVERY LVL 3: CPT | Performed by: RADIOLOGY

## 2019-08-23 ENCOUNTER — HOSPITAL ENCOUNTER (OUTPATIENT)
Dept: RADIATION ONCOLOGY | Facility: HOSPITAL | Age: 54
Discharge: HOME | End: 2019-08-23

## 2019-08-23 PROCEDURE — 77412 RADIATION TX DELIVERY LVL 3: CPT | Performed by: RADIOLOGY

## 2019-08-26 ENCOUNTER — HOSPITAL ENCOUNTER (OUTPATIENT)
Dept: RADIATION ONCOLOGY | Facility: HOSPITAL | Age: 54
Discharge: HOME | End: 2019-08-26

## 2019-08-26 PROCEDURE — 77412 RADIATION TX DELIVERY LVL 3: CPT | Performed by: RADIOLOGY

## 2019-08-27 ENCOUNTER — HOSPITAL ENCOUNTER (OUTPATIENT)
Dept: RADIATION ONCOLOGY | Facility: HOSPITAL | Age: 54
Discharge: HOME | End: 2019-08-27

## 2019-08-27 PROCEDURE — 77412 RADIATION TX DELIVERY LVL 3: CPT | Performed by: RADIOLOGY

## 2019-08-28 ENCOUNTER — HOSPITAL ENCOUNTER (OUTPATIENT)
Dept: RADIATION ONCOLOGY | Facility: HOSPITAL | Age: 54
Discharge: HOME | End: 2019-08-28

## 2019-08-28 PROCEDURE — 77412 RADIATION TX DELIVERY LVL 3: CPT | Performed by: RADIOLOGY

## 2019-08-29 ENCOUNTER — HOSPITAL ENCOUNTER (EMERGENCY)
Facility: HOSPITAL | Age: 54
Discharge: HOME | End: 2019-08-29
Attending: EMERGENCY MEDICINE | Admitting: EMERGENCY MEDICINE

## 2019-08-29 ENCOUNTER — APPOINTMENT (OUTPATIENT)
Dept: MRI IMAGING | Facility: HOSPITAL | Age: 54
End: 2019-08-29

## 2019-08-29 ENCOUNTER — HOSPITAL ENCOUNTER (OUTPATIENT)
Dept: RADIATION ONCOLOGY | Facility: HOSPITAL | Age: 54
Discharge: HOME | End: 2019-08-29

## 2019-08-29 VITALS
BODY MASS INDEX: 38.09 KG/M2 | TEMPERATURE: 98 F | WEIGHT: 207 LBS | HEART RATE: 86 BPM | SYSTOLIC BLOOD PRESSURE: 112 MMHG | RESPIRATION RATE: 16 BRPM | HEIGHT: 62 IN | OXYGEN SATURATION: 95 % | DIASTOLIC BLOOD PRESSURE: 61 MMHG

## 2019-08-29 DIAGNOSIS — S32.000A LUMBAR COMPRESSION FRACTURE, CLOSED, INITIAL ENCOUNTER (HCC): Primary | ICD-10-CM

## 2019-08-29 LAB
ALBUMIN SERPL-MCNC: 4.1 G/DL (ref 3.5–5)
ALBUMIN/GLOB SERPL: 1.3 G/DL (ref 1.1–2.5)
ALP SERPL-CCNC: 94 U/L (ref 24–120)
ALT SERPL W P-5'-P-CCNC: 25 U/L (ref 0–54)
ANION GAP SERPL CALCULATED.3IONS-SCNC: 7 MMOL/L (ref 4–13)
AST SERPL-CCNC: 18 U/L (ref 7–45)
BASOPHILS # BLD AUTO: 0.03 10*3/MM3 (ref 0–0.2)
BASOPHILS NFR BLD AUTO: 0.5 % (ref 0–1.5)
BILIRUB SERPL-MCNC: 0.5 MG/DL (ref 0.1–1)
BILIRUB UR QL STRIP: NEGATIVE
BUN BLD-MCNC: 15 MG/DL (ref 5–21)
BUN/CREAT SERPL: 26.3 (ref 7–25)
CALCIUM SPEC-SCNC: 9.4 MG/DL (ref 8.4–10.4)
CHLORIDE SERPL-SCNC: 103 MMOL/L (ref 98–110)
CLARITY UR: CLEAR
CO2 SERPL-SCNC: 29 MMOL/L (ref 24–31)
COLOR UR: YELLOW
CREAT BLD-MCNC: 0.57 MG/DL (ref 0.5–1.4)
DEPRECATED RDW RBC AUTO: 43.3 FL (ref 37–54)
EOSINOPHIL # BLD AUTO: 0.45 10*3/MM3 (ref 0–0.4)
EOSINOPHIL NFR BLD AUTO: 8.2 % (ref 0.3–6.2)
ERYTHROCYTE [DISTWIDTH] IN BLOOD BY AUTOMATED COUNT: 13.2 % (ref 12.3–15.4)
GFR SERPL CREATININE-BSD FRML MDRD: 111 ML/MIN/1.73
GLOBULIN UR ELPH-MCNC: 3.1 GM/DL
GLUCOSE BLD-MCNC: 112 MG/DL (ref 70–100)
GLUCOSE UR STRIP-MCNC: NEGATIVE MG/DL
HCT VFR BLD AUTO: 40 % (ref 34–46.6)
HGB BLD-MCNC: 13.6 G/DL (ref 12–15.9)
HGB UR QL STRIP.AUTO: NEGATIVE
HOLD SPECIMEN: NORMAL
IMM GRANULOCYTES # BLD AUTO: 0.02 10*3/MM3 (ref 0–0.05)
IMM GRANULOCYTES NFR BLD AUTO: 0.4 % (ref 0–0.5)
KETONES UR QL STRIP: NEGATIVE
LEUKOCYTE ESTERASE UR QL STRIP.AUTO: NEGATIVE
LYMPHOCYTES # BLD AUTO: 1.07 10*3/MM3 (ref 0.7–3.1)
LYMPHOCYTES NFR BLD AUTO: 19.5 % (ref 19.6–45.3)
MCH RBC QN AUTO: 30.6 PG (ref 26.6–33)
MCHC RBC AUTO-ENTMCNC: 34 G/DL (ref 31.5–35.7)
MCV RBC AUTO: 89.9 FL (ref 79–97)
MONOCYTES # BLD AUTO: 0.4 10*3/MM3 (ref 0.1–0.9)
MONOCYTES NFR BLD AUTO: 7.3 % (ref 5–12)
NEUTROPHILS # BLD AUTO: 3.53 10*3/MM3 (ref 1.7–7)
NEUTROPHILS NFR BLD AUTO: 64.1 % (ref 42.7–76)
NITRITE UR QL STRIP: NEGATIVE
NRBC BLD AUTO-RTO: 0 /100 WBC (ref 0–0.2)
PH UR STRIP.AUTO: 7.5 [PH] (ref 5–8)
PLATELET # BLD AUTO: 184 10*3/MM3 (ref 140–450)
PMV BLD AUTO: 8.8 FL (ref 6–12)
POTASSIUM BLD-SCNC: 3.9 MMOL/L (ref 3.5–5.3)
PROT SERPL-MCNC: 7.2 G/DL (ref 6.3–8.7)
PROT UR QL STRIP: NEGATIVE
RBC # BLD AUTO: 4.45 10*6/MM3 (ref 3.77–5.28)
SODIUM BLD-SCNC: 139 MMOL/L (ref 135–145)
SP GR UR STRIP: 1.01 (ref 1–1.03)
UROBILINOGEN UR QL STRIP: NORMAL
WBC NRBC COR # BLD: 5.5 10*3/MM3 (ref 3.4–10.8)
WHOLE BLOOD HOLD SPECIMEN: NORMAL

## 2019-08-29 PROCEDURE — 96374 THER/PROPH/DIAG INJ IV PUSH: CPT

## 2019-08-29 PROCEDURE — 85025 COMPLETE CBC W/AUTO DIFF WBC: CPT | Performed by: EMERGENCY MEDICINE

## 2019-08-29 PROCEDURE — 25010000002 KETOROLAC TROMETHAMINE PER 15 MG: Performed by: EMERGENCY MEDICINE

## 2019-08-29 PROCEDURE — 72148 MRI LUMBAR SPINE W/O DYE: CPT

## 2019-08-29 PROCEDURE — 77336 RADIATION PHYSICS CONSULT: CPT | Performed by: RADIOLOGY

## 2019-08-29 PROCEDURE — 99284 EMERGENCY DEPT VISIT MOD MDM: CPT

## 2019-08-29 PROCEDURE — 96376 TX/PRO/DX INJ SAME DRUG ADON: CPT

## 2019-08-29 PROCEDURE — 25010000002 ONDANSETRON PER 1 MG: Performed by: EMERGENCY MEDICINE

## 2019-08-29 PROCEDURE — 96375 TX/PRO/DX INJ NEW DRUG ADDON: CPT

## 2019-08-29 PROCEDURE — 81003 URINALYSIS AUTO W/O SCOPE: CPT | Performed by: EMERGENCY MEDICINE

## 2019-08-29 PROCEDURE — 80053 COMPREHEN METABOLIC PANEL: CPT | Performed by: EMERGENCY MEDICINE

## 2019-08-29 RX ORDER — OXYCODONE HYDROCHLORIDE 5 MG/1
15 CAPSULE ORAL
COMMUNITY

## 2019-08-29 RX ORDER — KETOROLAC TROMETHAMINE 10 MG/1
10 TABLET, FILM COATED ORAL EVERY 6 HOURS PRN
Qty: 15 TABLET | Refills: 0 | Status: SHIPPED | OUTPATIENT
Start: 2019-08-29 | End: 2019-11-01

## 2019-08-29 RX ORDER — SODIUM CHLORIDE 0.9 % (FLUSH) 0.9 %
10 SYRINGE (ML) INJECTION AS NEEDED
Status: DISCONTINUED | OUTPATIENT
Start: 2019-08-29 | End: 2019-08-29 | Stop reason: HOSPADM

## 2019-08-29 RX ORDER — MORPHINE SULFATE 15 MG/1
30 TABLET ORAL 3 TIMES DAILY
COMMUNITY

## 2019-08-29 RX ORDER — ONDANSETRON 2 MG/ML
4 INJECTION INTRAMUSCULAR; INTRAVENOUS ONCE
Status: COMPLETED | OUTPATIENT
Start: 2019-08-29 | End: 2019-08-29

## 2019-08-29 RX ORDER — KETOROLAC TROMETHAMINE 30 MG/ML
30 INJECTION, SOLUTION INTRAMUSCULAR; INTRAVENOUS ONCE
Status: COMPLETED | OUTPATIENT
Start: 2019-08-29 | End: 2019-08-29

## 2019-08-29 RX ADMIN — HYDROMORPHONE HYDROCHLORIDE 1 MG: 1 INJECTION, SOLUTION INTRAMUSCULAR; INTRAVENOUS; SUBCUTANEOUS at 08:36

## 2019-08-29 RX ADMIN — HYDROMORPHONE HYDROCHLORIDE 1 MG: 1 INJECTION, SOLUTION INTRAMUSCULAR; INTRAVENOUS; SUBCUTANEOUS at 12:48

## 2019-08-29 RX ADMIN — HYDROMORPHONE HYDROCHLORIDE 1 MG: 1 INJECTION, SOLUTION INTRAMUSCULAR; INTRAVENOUS; SUBCUTANEOUS at 09:49

## 2019-08-29 RX ADMIN — KETOROLAC TROMETHAMINE 30 MG: 30 INJECTION, SOLUTION INTRAMUSCULAR at 11:10

## 2019-08-29 RX ADMIN — ONDANSETRON HYDROCHLORIDE 4 MG: 2 SOLUTION INTRAMUSCULAR; INTRAVENOUS at 08:36

## 2019-08-30 ENCOUNTER — HOSPITAL ENCOUNTER (OUTPATIENT)
Dept: RADIATION ONCOLOGY | Facility: HOSPITAL | Age: 54
Discharge: HOME | End: 2019-08-30

## 2019-08-30 PROCEDURE — 77412 RADIATION TX DELIVERY LVL 3: CPT | Performed by: RADIOLOGY

## 2019-09-03 ENCOUNTER — HOSPITAL ENCOUNTER (OUTPATIENT)
Dept: RADIATION ONCOLOGY | Facility: HOSPITAL | Age: 54
Discharge: HOME | End: 2019-09-03

## 2019-09-03 ENCOUNTER — HOSPITAL ENCOUNTER (OUTPATIENT)
Dept: RADIATION ONCOLOGY | Facility: HOSPITAL | Age: 54
Setting detail: RADIATION/ONCOLOGY SERIES
End: 2019-09-03

## 2019-09-03 PROCEDURE — 77412 RADIATION TX DELIVERY LVL 3: CPT | Performed by: RADIOLOGY

## 2019-09-04 ENCOUNTER — HOSPITAL ENCOUNTER (OUTPATIENT)
Dept: RADIATION ONCOLOGY | Facility: HOSPITAL | Age: 54
Discharge: HOME | End: 2019-09-04

## 2019-09-04 PROCEDURE — 77412 RADIATION TX DELIVERY LVL 3: CPT | Performed by: RADIOLOGY

## 2019-09-05 ENCOUNTER — HOSPITAL ENCOUNTER (OUTPATIENT)
Dept: RADIATION ONCOLOGY | Facility: HOSPITAL | Age: 54
Discharge: HOME | End: 2019-09-05

## 2019-09-05 PROCEDURE — 77412 RADIATION TX DELIVERY LVL 3: CPT | Performed by: RADIOLOGY

## 2019-09-05 PROCEDURE — 77336 RADIATION PHYSICS CONSULT: CPT | Performed by: RADIOLOGY

## 2019-09-06 ENCOUNTER — HOSPITAL ENCOUNTER (OUTPATIENT)
Dept: RADIATION ONCOLOGY | Facility: HOSPITAL | Age: 54
Discharge: HOME | End: 2019-09-06

## 2019-09-06 PROCEDURE — 77412 RADIATION TX DELIVERY LVL 3: CPT | Performed by: RADIOLOGY

## 2019-09-09 ENCOUNTER — HOSPITAL ENCOUNTER (OUTPATIENT)
Dept: RADIATION ONCOLOGY | Facility: HOSPITAL | Age: 54
Discharge: HOME | End: 2019-09-09

## 2019-09-09 PROCEDURE — 77412 RADIATION TX DELIVERY LVL 3: CPT | Performed by: RADIOLOGY

## 2019-09-09 PROCEDURE — 77417 THER RADIOLOGY PORT IMAGE(S): CPT | Performed by: RADIOLOGY

## 2019-09-10 ENCOUNTER — HOSPITAL ENCOUNTER (OUTPATIENT)
Dept: RADIATION ONCOLOGY | Facility: HOSPITAL | Age: 54
Discharge: HOME | End: 2019-09-10

## 2019-09-10 PROCEDURE — 77412 RADIATION TX DELIVERY LVL 3: CPT | Performed by: RADIOLOGY

## 2019-09-11 ENCOUNTER — HOSPITAL ENCOUNTER (OUTPATIENT)
Dept: RADIATION ONCOLOGY | Facility: HOSPITAL | Age: 54
Discharge: HOME | End: 2019-09-11

## 2019-09-11 PROCEDURE — 77336 RADIATION PHYSICS CONSULT: CPT | Performed by: RADIOLOGY

## 2019-09-11 PROCEDURE — 77412 RADIATION TX DELIVERY LVL 3: CPT | Performed by: RADIOLOGY

## 2019-09-12 NOTE — PROGRESS NOTES
Diabetes Maternal Grandmother     Colon Cancer Paternal Grandmother     Cancer Paternal Grandmother         colon CA       Vitals:  Vitals:    09/13/19 1202   BP: (!) 155/80   Pulse: 90   SpO2: 95%   Weight: 198 lb (89.8 kg)   Height: 5' 2\" (1.575 m)        Subjective   REVIEW OF SYSTEMS:   Review of Systems   Constitutional: Positive for fatigue. Negative for chills, diaphoresis and fever. HENT: Negative. Negative for congestion, ear pain, hearing loss, nosebleeds, sore throat and tinnitus. Eyes: Negative. Negative for pain, discharge and redness. Respiratory: Negative. Negative for cough, shortness of breath and wheezing. Cardiovascular: Negative. Negative for chest pain, palpitations and leg swelling. Gastrointestinal: Negative. Negative for abdominal pain, blood in stool, constipation, diarrhea, nausea and vomiting. Endocrine: Negative for polydipsia. Genitourinary: Negative for dysuria, flank pain, frequency, hematuria and urgency. Musculoskeletal: Positive for back pain and gait problem. Negative for myalgias and neck pain. Skin: Negative. Negative for rash. Neurological: Positive for weakness (Lower extremity). Negative for dizziness, tremors, seizures and headaches. Hematological: Does not bruise/bleed easily. Psychiatric/Behavioral: Negative. The patient is not nervous/anxious. Objective   PHYSICAL EXAM:  Physical Exam   Constitutional: She is oriented to person, place, and time. She appears well-developed and well-nourished. No distress. HENT:   Head: Normocephalic and atraumatic. Mouth/Throat: Uvula is midline, oropharynx is clear and moist and mucous membranes are normal. No tonsillar exudate. Eyes: Pupils are equal, round, and reactive to light. Conjunctivae, EOM and lids are normal. Right eye exhibits no discharge. Left eye exhibits no discharge. No scleral icterus. Neck: Trachea normal and normal range of motion. Neck supple. No JVD present.  No Specifically, we discussed the risk related tobacco including but not limited to carcinoma, cardiovascular disease, stroke, and financial loss. I advised to quit smoking and offered resources to include nicotine patches to help with the craving. The patient is contemplated at this time. I will follow-up on smoking cessation during the next visit and continue to encourage quitting tobacco use. FOLLOW UP:  1. Follow-up appointment given for 6 weeks  2. Follow-up with Dr. Howie Marrero to complete radiation therapy as recommended  3. Follow-up with pain management as recommended  4.  Follow-up with other medical providers as recommended    Electronically signed by AURY Jamison on 9/25/2019 at 5:45 PM

## 2019-09-13 ENCOUNTER — HOSPITAL ENCOUNTER (OUTPATIENT)
Dept: INFUSION THERAPY | Age: 54
Discharge: HOME | End: 2019-09-13
Payer: MEDICARE

## 2019-09-13 ENCOUNTER — OFFICE VISIT (OUTPATIENT)
Dept: HEMATOLOGY | Age: 54
End: 2019-09-13
Payer: MEDICARE

## 2019-09-13 ENCOUNTER — HOSPITAL ENCOUNTER (OUTPATIENT)
Dept: RADIATION ONCOLOGY | Facility: HOSPITAL | Age: 54
Discharge: HOME | End: 2019-09-13

## 2019-09-13 VITALS
HEIGHT: 62 IN | HEART RATE: 90 BPM | SYSTOLIC BLOOD PRESSURE: 155 MMHG | WEIGHT: 198 LBS | DIASTOLIC BLOOD PRESSURE: 80 MMHG | OXYGEN SATURATION: 95 % | BODY MASS INDEX: 36.44 KG/M2

## 2019-09-13 DIAGNOSIS — N28.89 RENAL MASS, RIGHT: ICD-10-CM

## 2019-09-13 DIAGNOSIS — M54.42 CHRONIC BILATERAL LOW BACK PAIN WITH BILATERAL SCIATICA: ICD-10-CM

## 2019-09-13 DIAGNOSIS — C90.00 MULTIPLE MYELOMA, REMISSION STATUS UNSPECIFIED (HCC): ICD-10-CM

## 2019-09-13 DIAGNOSIS — G89.29 CHRONIC BILATERAL LOW BACK PAIN WITH BILATERAL SCIATICA: ICD-10-CM

## 2019-09-13 DIAGNOSIS — M54.41 CHRONIC BILATERAL LOW BACK PAIN WITH BILATERAL SCIATICA: ICD-10-CM

## 2019-09-13 DIAGNOSIS — Z72.0 TOBACCO ABUSE: ICD-10-CM

## 2019-09-13 DIAGNOSIS — C90.30 SOLITARY PLASMACYTOMA OF BONE (HCC): Primary | ICD-10-CM

## 2019-09-13 PROCEDURE — 99213 OFFICE O/P EST LOW 20 MIN: CPT | Performed by: NURSE PRACTITIONER

## 2019-09-13 PROCEDURE — G8427 DOCREV CUR MEDS BY ELIG CLIN: HCPCS | Performed by: NURSE PRACTITIONER

## 2019-09-13 PROCEDURE — 85025 COMPLETE CBC W/AUTO DIFF WBC: CPT

## 2019-09-13 PROCEDURE — G8417 CALC BMI ABV UP PARAM F/U: HCPCS | Performed by: NURSE PRACTITIONER

## 2019-09-13 PROCEDURE — 77412 RADIATION TX DELIVERY LVL 3: CPT | Performed by: RADIOLOGY

## 2019-09-13 PROCEDURE — 4004F PT TOBACCO SCREEN RCVD TLK: CPT | Performed by: NURSE PRACTITIONER

## 2019-09-13 PROCEDURE — 3017F COLORECTAL CA SCREEN DOC REV: CPT | Performed by: NURSE PRACTITIONER

## 2019-09-16 ENCOUNTER — HOSPITAL ENCOUNTER (OUTPATIENT)
Dept: RADIATION ONCOLOGY | Facility: HOSPITAL | Age: 54
Discharge: HOME | End: 2019-09-16

## 2019-09-16 PROCEDURE — 77412 RADIATION TX DELIVERY LVL 3: CPT | Performed by: RADIOLOGY

## 2019-09-17 ENCOUNTER — HOSPITAL ENCOUNTER (OUTPATIENT)
Dept: RADIATION ONCOLOGY | Facility: HOSPITAL | Age: 54
Discharge: HOME | End: 2019-09-17

## 2019-09-17 PROCEDURE — 77412 RADIATION TX DELIVERY LVL 3: CPT | Performed by: RADIOLOGY

## 2019-09-18 ENCOUNTER — HOSPITAL ENCOUNTER (OUTPATIENT)
Dept: RADIATION ONCOLOGY | Facility: HOSPITAL | Age: 54
Discharge: HOME | End: 2019-09-18

## 2019-09-18 PROCEDURE — 77412 RADIATION TX DELIVERY LVL 3: CPT | Performed by: RADIOLOGY

## 2019-09-19 ENCOUNTER — HOSPITAL ENCOUNTER (OUTPATIENT)
Dept: RADIATION ONCOLOGY | Facility: HOSPITAL | Age: 54
Discharge: HOME | End: 2019-09-19

## 2019-09-19 PROCEDURE — 77336 RADIATION PHYSICS CONSULT: CPT | Performed by: RADIOLOGY

## 2019-09-19 PROCEDURE — 77412 RADIATION TX DELIVERY LVL 3: CPT | Performed by: RADIOLOGY

## 2019-09-25 PROBLEM — G89.29 CHRONIC BILATERAL LOW BACK PAIN WITH BILATERAL SCIATICA: Status: ACTIVE | Noted: 2019-09-25

## 2019-09-25 PROBLEM — M54.42 CHRONIC BILATERAL LOW BACK PAIN WITH BILATERAL SCIATICA: Status: ACTIVE | Noted: 2019-09-25

## 2019-09-25 PROBLEM — C90.30: Status: ACTIVE | Noted: 2019-09-25

## 2019-09-25 PROBLEM — Z72.0 TOBACCO ABUSE: Status: ACTIVE | Noted: 2019-09-25

## 2019-09-25 PROBLEM — M54.41 CHRONIC BILATERAL LOW BACK PAIN WITH BILATERAL SCIATICA: Status: ACTIVE | Noted: 2019-09-25

## 2019-09-25 PROBLEM — N28.89 RENAL MASS, RIGHT: Status: ACTIVE | Noted: 2019-09-25

## 2019-09-25 ASSESSMENT — ENCOUNTER SYMPTOMS
CONSTIPATION: 0
EYE PAIN: 0
SORE THROAT: 0
COUGH: 0
RESPIRATORY NEGATIVE: 1
BACK PAIN: 1
ABDOMINAL PAIN: 0
BLOOD IN STOOL: 0
EYE REDNESS: 0
DIARRHEA: 0
GASTROINTESTINAL NEGATIVE: 1
SHORTNESS OF BREATH: 0
EYE DISCHARGE: 0
EYES NEGATIVE: 1
WHEEZING: 0
NAUSEA: 0
VOMITING: 0

## 2019-10-02 ENCOUNTER — HOSPITAL ENCOUNTER (OUTPATIENT)
Dept: CT IMAGING | Age: 54
Discharge: HOME | End: 2019-10-02
Payer: MEDICARE

## 2019-10-02 DIAGNOSIS — N28.89 RENAL MASS, RIGHT: ICD-10-CM

## 2019-10-02 PROCEDURE — 6360000004 HC RX CONTRAST MEDICATION: Performed by: NURSE PRACTITIONER

## 2019-10-02 PROCEDURE — 74178 CT ABD&PLV WO CNTR FLWD CNTR: CPT

## 2019-10-02 RX ADMIN — IOPAMIDOL 75 ML: 755 INJECTION, SOLUTION INTRAVENOUS at 09:09

## 2019-10-07 ENCOUNTER — TELEPHONE (OUTPATIENT)
Dept: FAMILY MEDICINE CLINIC | Age: 54
End: 2019-10-07

## 2019-10-25 ENCOUNTER — HOSPITAL ENCOUNTER (OUTPATIENT)
Dept: INFUSION THERAPY | Age: 54
Discharge: HOME | End: 2019-10-25
Payer: COMMERCIAL

## 2019-10-25 ENCOUNTER — OFFICE VISIT (OUTPATIENT)
Dept: HEMATOLOGY | Age: 54
End: 2019-10-25
Payer: COMMERCIAL

## 2019-10-25 VITALS
DIASTOLIC BLOOD PRESSURE: 70 MMHG | HEIGHT: 62 IN | SYSTOLIC BLOOD PRESSURE: 128 MMHG | OXYGEN SATURATION: 94 % | HEART RATE: 78 BPM | WEIGHT: 195 LBS | BODY MASS INDEX: 35.88 KG/M2

## 2019-10-25 DIAGNOSIS — M54.42 CHRONIC BILATERAL LOW BACK PAIN WITH BILATERAL SCIATICA: ICD-10-CM

## 2019-10-25 DIAGNOSIS — M54.41 CHRONIC BILATERAL LOW BACK PAIN WITH BILATERAL SCIATICA: ICD-10-CM

## 2019-10-25 DIAGNOSIS — G89.29 CHRONIC BILATERAL LOW BACK PAIN WITH BILATERAL SCIATICA: ICD-10-CM

## 2019-10-25 DIAGNOSIS — C90.30 SOLITARY PLASMACYTOMA OF BONE (HCC): Primary | ICD-10-CM

## 2019-10-25 DIAGNOSIS — Z72.0 TOBACCO ABUSE: ICD-10-CM

## 2019-10-25 DIAGNOSIS — N28.1 RENAL CYST: ICD-10-CM

## 2019-10-25 PROCEDURE — 4004F PT TOBACCO SCREEN RCVD TLK: CPT | Performed by: NURSE PRACTITIONER

## 2019-10-25 PROCEDURE — G8427 DOCREV CUR MEDS BY ELIG CLIN: HCPCS | Performed by: NURSE PRACTITIONER

## 2019-10-25 PROCEDURE — G8417 CALC BMI ABV UP PARAM F/U: HCPCS | Performed by: NURSE PRACTITIONER

## 2019-10-25 PROCEDURE — 99213 OFFICE O/P EST LOW 20 MIN: CPT | Performed by: NURSE PRACTITIONER

## 2019-10-25 PROCEDURE — 3017F COLORECTAL CA SCREEN DOC REV: CPT | Performed by: NURSE PRACTITIONER

## 2019-10-25 PROCEDURE — G8482 FLU IMMUNIZE ORDER/ADMIN: HCPCS | Performed by: NURSE PRACTITIONER

## 2019-10-25 PROCEDURE — 80053 COMPREHEN METABOLIC PANEL: CPT

## 2019-10-25 PROCEDURE — 85025 COMPLETE CBC W/AUTO DIFF WBC: CPT

## 2019-10-25 RX ORDER — OXYCODONE HYDROCHLORIDE 15 MG/1
15 TABLET ORAL
COMMUNITY
End: 2020-12-18

## 2019-10-25 RX ORDER — MORPHINE SULFATE 30 MG/1
100 TABLET ORAL EVERY 8 HOURS
COMMUNITY
End: 2020-12-18

## 2019-10-31 PROBLEM — Z92.3 HISTORY OF RADIATION THERAPY: Status: ACTIVE | Noted: 2019-10-31

## 2019-11-01 ENCOUNTER — OFFICE VISIT (OUTPATIENT)
Dept: RADIATION ONCOLOGY | Facility: HOSPITAL | Age: 54
End: 2019-11-01

## 2019-11-01 ENCOUNTER — HOSPITAL ENCOUNTER (OUTPATIENT)
Dept: RADIATION ONCOLOGY | Facility: HOSPITAL | Age: 54
Setting detail: RADIATION/ONCOLOGY SERIES
End: 2019-11-01

## 2019-11-01 VITALS
DIASTOLIC BLOOD PRESSURE: 62 MMHG | BODY MASS INDEX: 36.25 KG/M2 | SYSTOLIC BLOOD PRESSURE: 118 MMHG | HEIGHT: 62 IN | WEIGHT: 197 LBS

## 2019-11-01 DIAGNOSIS — Z92.3 HISTORY OF RADIATION THERAPY: ICD-10-CM

## 2019-11-01 DIAGNOSIS — C90.30 PLASMACYTOMA OF BONE (HCC): Primary | ICD-10-CM

## 2019-11-01 DIAGNOSIS — F17.200 CURRENT EVERY DAY SMOKER: ICD-10-CM

## 2019-11-01 DIAGNOSIS — Z98.890 STATUS POST KYPHOPLASTY: ICD-10-CM

## 2019-11-01 PROCEDURE — G0463 HOSPITAL OUTPT CLINIC VISIT: HCPCS | Performed by: RADIOLOGY

## 2019-11-15 ENCOUNTER — TELEPHONE (OUTPATIENT)
Dept: RADIATION ONCOLOGY | Facility: HOSPITAL | Age: 54
End: 2019-11-15

## 2019-11-15 RX ORDER — PROMETHAZINE HYDROCHLORIDE 25 MG/1
25 TABLET ORAL EVERY 6 HOURS PRN
Qty: 30 TABLET | Refills: 1 | Status: SHIPPED | OUTPATIENT
Start: 2019-11-15

## 2019-11-15 NOTE — TELEPHONE ENCOUNTER
Pt called in complaining of nausea despite Zofran 8 mg.  Has been having nausea during XRT and since.  Sees pain management in [place].  She has spoken with them about nausea.    She is using a good bowel regimen.    Has not tried phenergan.  I will speak with Ning DAWSON re: possibly sending in Phenergan.    I did mention changes in pain meds might be considered but she would have to discuss with pain management.

## 2019-12-06 ENCOUNTER — OFFICE VISIT (OUTPATIENT)
Dept: FAMILY MEDICINE CLINIC | Age: 54
End: 2019-12-06
Payer: COMMERCIAL

## 2019-12-06 ENCOUNTER — HOSPITAL ENCOUNTER (OUTPATIENT)
Dept: GENERAL RADIOLOGY | Age: 54
Discharge: HOME | End: 2019-12-06
Payer: COMMERCIAL

## 2019-12-06 ENCOUNTER — OFFICE VISIT (OUTPATIENT)
Dept: HEMATOLOGY | Age: 54
End: 2019-12-06
Payer: COMMERCIAL

## 2019-12-06 ENCOUNTER — HOSPITAL ENCOUNTER (OUTPATIENT)
Dept: INFUSION THERAPY | Age: 54
Discharge: HOME | End: 2019-12-06
Payer: COMMERCIAL

## 2019-12-06 VITALS
SYSTOLIC BLOOD PRESSURE: 138 MMHG | WEIGHT: 190.6 LBS | OXYGEN SATURATION: 95 % | HEIGHT: 62 IN | HEART RATE: 78 BPM | BODY MASS INDEX: 35.07 KG/M2 | DIASTOLIC BLOOD PRESSURE: 72 MMHG

## 2019-12-06 VITALS
SYSTOLIC BLOOD PRESSURE: 136 MMHG | DIASTOLIC BLOOD PRESSURE: 84 MMHG | OXYGEN SATURATION: 98 % | WEIGHT: 191.4 LBS | BODY MASS INDEX: 35.01 KG/M2 | TEMPERATURE: 97.4 F | HEART RATE: 86 BPM

## 2019-12-06 DIAGNOSIS — M54.42 CHRONIC BILATERAL LOW BACK PAIN WITH BILATERAL SCIATICA: ICD-10-CM

## 2019-12-06 DIAGNOSIS — E78.5 HYPERLIPIDEMIA, UNSPECIFIED HYPERLIPIDEMIA TYPE: ICD-10-CM

## 2019-12-06 DIAGNOSIS — C90.30 SOLITARY PLASMACYTOMA OF BONE (HCC): Primary | ICD-10-CM

## 2019-12-06 DIAGNOSIS — G89.29 CHRONIC BILATERAL LOW BACK PAIN WITH BILATERAL SCIATICA: ICD-10-CM

## 2019-12-06 DIAGNOSIS — N28.1 RENAL CYST: ICD-10-CM

## 2019-12-06 DIAGNOSIS — M54.6 ACUTE MIDLINE THORACIC BACK PAIN: ICD-10-CM

## 2019-12-06 DIAGNOSIS — Z72.0 TOBACCO ABUSE: ICD-10-CM

## 2019-12-06 DIAGNOSIS — Z23 NEED FOR INFLUENZA VACCINATION: ICD-10-CM

## 2019-12-06 DIAGNOSIS — M54.41 CHRONIC BILATERAL LOW BACK PAIN WITH BILATERAL SCIATICA: ICD-10-CM

## 2019-12-06 DIAGNOSIS — C90.30 SOLITARY PLASMACYTOMA OF BONE (HCC): ICD-10-CM

## 2019-12-06 LAB
ALBUMIN SERPL-MCNC: 4.4 G/DL (ref 3.5–5.2)
ALP BLD-CCNC: 65 U/L (ref 35–104)
ALT SERPL-CCNC: 14 U/L (ref 5–33)
ANION GAP SERPL CALCULATED.3IONS-SCNC: 15 MMOL/L (ref 7–19)
AST SERPL-CCNC: 11 U/L (ref 5–32)
BACTERIA: NORMAL /HPF
BILIRUB SERPL-MCNC: 0.4 MG/DL (ref 0.2–1.2)
BILIRUBIN URINE: NEGATIVE
BLOOD, URINE: NEGATIVE
BUN BLDV-MCNC: 13 MG/DL (ref 6–20)
CALCIUM SERPL-MCNC: 10.1 MG/DL (ref 8.6–10)
CHLORIDE BLD-SCNC: 100 MMOL/L (ref 98–111)
CHOLESTEROL, TOTAL: 118 MG/DL (ref 160–199)
CLARITY: CLEAR
CO2: 25 MMOL/L (ref 22–29)
COLOR: ABNORMAL
CREAT SERPL-MCNC: 0.6 MG/DL (ref 0.5–0.9)
EPITHELIAL CELLS, UA: 8 /HPF (ref 0–5)
GFR NON-AFRICAN AMERICAN: >60
GLUCOSE BLD-MCNC: 99 MG/DL (ref 74–109)
GLUCOSE URINE: NEGATIVE MG/DL
HBA1C MFR BLD: 5.5 % (ref 4–6)
HCT VFR BLD CALC: 46.9 % (ref 37–47)
HDLC SERPL-MCNC: 38 MG/DL (ref 65–121)
HEMOGLOBIN: 14.9 G/DL (ref 12–16)
HYALINE CASTS: 8 /HPF (ref 0–8)
KETONES, URINE: ABNORMAL MG/DL
LDL CHOLESTEROL CALCULATED: 39 MG/DL
LEUKOCYTE ESTERASE, URINE: ABNORMAL
MCH RBC QN AUTO: 30.2 PG (ref 27–31)
MCHC RBC AUTO-ENTMCNC: 31.8 G/DL (ref 33–37)
MCV RBC AUTO: 95.1 FL (ref 81–99)
MICROALBUMIN UR-MCNC: 2.8 MG/DL (ref 0–19)
NITRITE, URINE: NEGATIVE
PDW BLD-RTO: 12.5 % (ref 11.5–14.5)
PH UA: 6 (ref 5–8)
PLATELET # BLD: 247 K/UL (ref 130–400)
PMV BLD AUTO: 10 FL (ref 9.4–12.3)
POTASSIUM SERPL-SCNC: 3.8 MMOL/L (ref 3.5–5)
PROTEIN UA: ABNORMAL MG/DL
RBC # BLD: 4.93 M/UL (ref 4.2–5.4)
RBC UA: 3 /HPF (ref 0–4)
SODIUM BLD-SCNC: 140 MMOL/L (ref 136–145)
SPECIFIC GRAVITY UA: 1.03 (ref 1–1.03)
TOTAL PROTEIN: 7.6 G/DL (ref 6.6–8.7)
TRIGL SERPL-MCNC: 205 MG/DL (ref 0–149)
UROBILINOGEN, URINE: 0.2 E.U./DL
WBC # BLD: 7.2 K/UL (ref 4.8–10.8)
WBC UA: 4 /HPF (ref 0–5)

## 2019-12-06 PROCEDURE — 3044F HG A1C LEVEL LT 7.0%: CPT | Performed by: FAMILY MEDICINE

## 2019-12-06 PROCEDURE — G8482 FLU IMMUNIZE ORDER/ADMIN: HCPCS | Performed by: FAMILY MEDICINE

## 2019-12-06 PROCEDURE — 2022F DILAT RTA XM EVC RTNOPTHY: CPT | Performed by: FAMILY MEDICINE

## 2019-12-06 PROCEDURE — 99213 OFFICE O/P EST LOW 20 MIN: CPT | Performed by: NURSE PRACTITIONER

## 2019-12-06 PROCEDURE — 72072 X-RAY EXAM THORAC SPINE 3VWS: CPT

## 2019-12-06 PROCEDURE — G8417 CALC BMI ABV UP PARAM F/U: HCPCS | Performed by: NURSE PRACTITIONER

## 2019-12-06 PROCEDURE — 85025 COMPLETE CBC W/AUTO DIFF WBC: CPT

## 2019-12-06 PROCEDURE — 99211 OFF/OP EST MAY X REQ PHY/QHP: CPT

## 2019-12-06 PROCEDURE — 4004F PT TOBACCO SCREEN RCVD TLK: CPT | Performed by: FAMILY MEDICINE

## 2019-12-06 PROCEDURE — G8417 CALC BMI ABV UP PARAM F/U: HCPCS | Performed by: FAMILY MEDICINE

## 2019-12-06 PROCEDURE — 90686 IIV4 VACC NO PRSV 0.5 ML IM: CPT | Performed by: FAMILY MEDICINE

## 2019-12-06 PROCEDURE — G8427 DOCREV CUR MEDS BY ELIG CLIN: HCPCS | Performed by: NURSE PRACTITIONER

## 2019-12-06 PROCEDURE — 90471 IMMUNIZATION ADMIN: CPT | Performed by: FAMILY MEDICINE

## 2019-12-06 PROCEDURE — G8427 DOCREV CUR MEDS BY ELIG CLIN: HCPCS | Performed by: FAMILY MEDICINE

## 2019-12-06 PROCEDURE — 3017F COLORECTAL CA SCREEN DOC REV: CPT | Performed by: FAMILY MEDICINE

## 2019-12-06 PROCEDURE — 3017F COLORECTAL CA SCREEN DOC REV: CPT | Performed by: NURSE PRACTITIONER

## 2019-12-06 PROCEDURE — G8482 FLU IMMUNIZE ORDER/ADMIN: HCPCS | Performed by: NURSE PRACTITIONER

## 2019-12-06 PROCEDURE — 99214 OFFICE O/P EST MOD 30 MIN: CPT | Performed by: FAMILY MEDICINE

## 2019-12-06 PROCEDURE — 4004F PT TOBACCO SCREEN RCVD TLK: CPT | Performed by: NURSE PRACTITIONER

## 2019-12-06 ASSESSMENT — ENCOUNTER SYMPTOMS
RESPIRATORY NEGATIVE: 1
GASTROINTESTINAL NEGATIVE: 1
EYES NEGATIVE: 1
BACK PAIN: 1
ALLERGIC/IMMUNOLOGIC NEGATIVE: 1

## 2019-12-10 PROBLEM — N28.1 RENAL CYST: Status: ACTIVE | Noted: 2019-09-25

## 2019-12-10 ASSESSMENT — ENCOUNTER SYMPTOMS
CONSTIPATION: 0
EYES NEGATIVE: 1
BACK PAIN: 1
COUGH: 0
EYE DISCHARGE: 0
WHEEZING: 0
EYE REDNESS: 0
GASTROINTESTINAL NEGATIVE: 1
WHEEZING: 0
EYE REDNESS: 0
SHORTNESS OF BREATH: 0
NAUSEA: 0
DIARRHEA: 0
CONSTIPATION: 0
RESPIRATORY NEGATIVE: 1
EYES NEGATIVE: 1
VOMITING: 0
RESPIRATORY NEGATIVE: 1
EYE PAIN: 0
EYE PAIN: 0
GASTROINTESTINAL NEGATIVE: 1
SHORTNESS OF BREATH: 0
BACK PAIN: 1
NAUSEA: 0
SORE THROAT: 0
SORE THROAT: 0
COUGH: 0
ABDOMINAL PAIN: 0
VOMITING: 0
ABDOMINAL PAIN: 0
BLOOD IN STOOL: 0
DIARRHEA: 0
EYE DISCHARGE: 0
BLOOD IN STOOL: 0

## 2020-01-17 ENCOUNTER — DOCUMENTATION (OUTPATIENT)
Dept: RADIATION ONCOLOGY | Facility: HOSPITAL | Age: 55
End: 2020-01-17

## 2020-01-17 NOTE — PROGRESS NOTES
This patient called with questions regarding a PET scan for follow-up of her solitary plasmacytoma.  The NCCN guidelines indicate follow-up includes imaging at yearly intervals as well as laboratory evaluation which is she is having performed by her other physicians.    She will be due for a follow-up PET scan in September 2020.

## 2020-01-20 ENCOUNTER — TELEPHONE (OUTPATIENT)
Dept: RADIATION ONCOLOGY | Facility: HOSPITAL | Age: 55
End: 2020-01-20

## 2020-01-20 NOTE — TELEPHONE ENCOUNTER
"Returned call re: need to schedule PET question.     \"The NCCN guidelines indicate follow-up includes imaging at yearly intervals as well as laboratory evaluation which is she is having performed by her other physicians.     She will be due for a follow-up PET scan in September 2020.\" per Dr. South.    Instructed to call back if she has more questions.      "

## 2020-02-04 NOTE — PROGRESS NOTES
[facility]  MD Ning Ramirez, ANGELIQUEN, PA-C  ____________________________________________________________  [facility]  Department of Radiation Oncology  [address] [place] [id number]  Office:  [phone]  Fax: [phone]    DATE:   02/05/2020    PATIENT: Riri Denise                                MEDICAL RECORD #: [id number]    Reason for Follow up Visit: Riri Denise is a very pleasant 55 y.o. patient that completed radiation therapy to L5 for plasmacytoma and returns to the clinic today for routine follow up exam. Reports constipation, back pain, and some hip pain. Patient follows pain management in [place]. Denies activity change, appetite change, unexpected weight change, nausea/vomiting, diarrhea, light-headedness, weakness, and headaches. He continues to follow MDs in [place] and .    HISTORY OF PRESENT ILLNESS  Diagnosed in July 2019 with incidental solitary plasmacytoma involving L5 vertebra causing compression fracture.  Underwent kyphoplasty on May 8, 2019 revealing a solitary extramedullary plasmacytoma.  . CRAB Criteria: Calcium 10.2, Renal/Creatinine 0.60, Anemia/Hgb 14.4, Bone Lesions: solitary extramedullary plasma cytoma without lytic or bony lesions. Follows Dr. Kacy Garcia anticipates radiation therapy and routine monitoring with serology studies. She completed radiation therapy, 5040 cGy in 28 fractions to L5 on 09/19/2019.     02/14/2019 - X-ray right hip:  • No visualized acute fracture or malalignment.    02/14/2019 - X-ray lumbar spine:  • No acute fracture or traumatic malalignment.  • Underlying mild scoliotic curvature.  • Advanced facet arthropathy with neuroforaminal narrowing at L4-5 and L5-S1.    02/21/2019- CT Abdomen/Pelvis:  • Probable fatty infiltration of the liver. Prior cholecystectomy.  • Probable 1.4 cm left renal cyst but is not fully characterized without contrast.  • Probable  previous colon anastomosis in the sigmoid region.  • Prior hysterectomy.  • Postsurgical changes of the anterior abdominal wall, as described above.    03/01/2019 - MRI Right Hip due to right hip/low back pain:  • Minimal cartilage attenuation at the right hip joint  • Othwerwise unremarkable MRI of the right hip with no acute osseous or soft tissue abnormality seen    03/05/2019 - Renal Ultrasound:  • Hypoechoic lesion involving the interpolar aspect of the left kidney most likely represents a cyst but does not fit all the criteria for simple cyst as it does contain some internal echoes. Either short interval follow-up ultrasound to assure stability or CT urography would be recommended. The kidneys are otherwise normal in sonographic appearance.   • The urinary bladder is evacuated.    03/14/2019 - CT Abdomen/Pelvis:  • A left renal cyst which has not changed in size or shape since 2015.  • A tiny low-density nodule in the right kidney which is too small to be further characterized. A follow-up examination in 6 months may be obtained for comparison.  • The diverticulosis of the colon. No evidence for diverticulitis.  • A prior cholecystectomy.    05/08/2019 - Kyphoplasty to the fifth lumbar vertebrae per :  Bone, L5 vertebral body, biopsy:   • Small to minute fragments of bone with markedly increased number of marrow plasma cells.  Comment: Immunohistochemical studies performed at [facility] indicate that plasma cells account for approximately 70-80% of marrow cellularity.  There is a high background on both kappa and lambda immunostains, Kappa light chain restriction is favored.  Please refer to the complete pathology report from [facility] which has been scanned into EPIC.  The findings are compatible with involvement by a plasma cell myeloma.     07/02/2019 - Bone Marrow Biopsy:  • Overall low normocellular bone marrow for age ( ~30-40%) with trilineage hematopoiesis, no significant  dyspoiesis, no increase in blasts and no atypical plasmacytosis  • No evidence of plasma cell neoplasm or lymphoproliferative disorder  • A small population of polyclonal plasma cells  • Decreased stainable storage iron; no increase in reticulin fibrosis  • Karyotypic studies pending    07/08/2019 - Bone Survey:  • Previous cervical spine surgery  • Percutaneous kyphoplasty of L5 due to an L5 fracture  • No lytic or bony expansile lesions identified    07/12/2019 - Appointment with :  • Referral to  for adjuvant XRT for plasmacytoma  • Referral to  at [facility] for second opinion  • Ordered PET Scan  • Follow up in 2 -3 weeks     07/23/2019 - PET Scan:  • Mild uptake in the L5 vertebral body with an SUV max of 3.3. There has been prior compression deformity at this location with kyphoplasty. Therefore, this uptake is indeterminate and may be postoperative in nature. There are no other areas of abnormal bone uptake. There are no lytic appearing bone lesions identified on bone window images.  • Prior sigmoid colon resection and anastomoses.  • Prior hysterectomy. Prior cholecystectomy.  • Other nonacute findings, as discussed above.    07/24/2019 - Appointment with  ([facility]):  • Patient had incidentally found solitary plasmacytoma involving L5 vertebra causing compression fracture. Bone marrow is negative for clonal plasma cell and poor outside records SPEP was negative for M protein with normal serum free light chain ratio.  • I will complete her evaluation with repeat labs including spot urine electrophoresis, quantitative immunoglobulins.   • I explain her excellent prognosis and management options including curative intent radiation therapy.   • Patient will be referred to local provider Dr. Mickey South.   • Patient will need surveillance SPEP serum free light chain every 6-12 months for monitoring. Risk of progression to multiple myeloma is around 30-50% over 10  years. CRAB criteria explained to the patient.  • Follow up in 4 months with repeat myeloma labs.     07/29/2019 - X-ray Lumbar Spine:  • Stable compression deformity of L5 with previous kyphoplasty.  • There may be very minimal compression of the superior endplate of L3 that is new compared to the prior study. This is minimal.  • Demineralized bones. Mild scoliosis to the right. Degenerative changes, as described.    08/08/2019 - 09/19/2019 - Completed Radiation course:  • Received 5040 cGy in 28 fractions to L5 via external beam radiation therapy.    10/02/2019 - CT Abdomen/Pelvis:  • Mild fatty infiltration of the liver. Prior cholecystectomy. Mild prominence of the extrahepatic biliary tree likely due to reservoir effect.  • A 1 cm lesion in the right mid kidney and a 1.4 cm lesion in the mid pole region left kidney do not enhance significantly and Hounsfield unit measurements are 20 or less. These findings are consistent with cysts. 2 other smaller lesions, one in each kidney, are likely small cysts but are too small to fully characterize.  • Multiple lumbar compression deformities with kyphoplasty at L2, L3, L4 and L5.  • Atheromatous disease of the aortoiliac vessels.  • Prior appendectomy and colon resection with a proximal sigmoid colon anastomosis.    11/01/2019 - Appointment with :  • Return to Dr. South in 3 months  • Follow with Dr. Garcia as scheduled    12/06/2019 - X-ray Thoracic spine:  • Generalized demineralization with minimal scoliosis and no compression deformities in the thoracic spine  • Evidence of kyphoplasty at L2  • Status post anterior fusion cervical spine.    01/07/2020 - Appointment with :  • Follow up in 4 weeks     History obtained from  PATIENT and CHART    PAST MEDICAL HISTORY  Past Medical History:   Diagnosis Date   • Arthritis    • Degenerative cervical disc    • Diabetes mellitus (CMS/HCC)    • Diverticula, colon    • Hypertension    • Mitral valve  prolapse    • Osteopetrosis    • Plasmacytoma (CMS/HCC)    • PONV (postoperative nausea and vomiting)     only when has demerol       PAST SURGICAL HISTORY  Past Surgical History:   Procedure Laterality Date   • ANKLE SURGERY Right     X2   • APPENDECTOMY     • CERVICAL FUSION     • CHOLECYSTECTOMY     • COLON RESECTION     • ENDOMETRIAL ABLATION     • FACIAL FRACTURE SURGERY     • GASTROCNEMIUS RECESSION Right 12/19/2017    Procedure: RECESSION GASTROCNEMIUS RIGHT ANKLE ;  Surgeon: Herve Bojorquez DPM;  Location:  [facility] OR;  Service:    • HERNIA REPAIR      WITH MESH   • HYSTERECTOMY     • KYPHOPLASTY     • OVARIAN CYST REMOVAL      AND OVARY REMOVAL   • PA ARTHROPLASTY ANKLE JOINT Right 12/19/2017    Procedure: TOTAL ANKLE ARTHROPLASTY WITH INFINITY IMPLANT, GASTROCNEMIUS RECESSION RIGHT ANKLE ;  Surgeon: Herve Bojorquez DPM;  Location:  [facility] OR;  Service: Podiatry   • TONSILLECTOMY     • TUBAL ABDOMINAL LIGATION        FAMILY HISTORY  family history includes Cancer in her nephew; Colon cancer in her paternal grandmother; Leukemia in her nephew; Lung cancer in her father; Thrombocytopenia in her daughter.     SOCIAL HISTORY  Social History     Tobacco Use   • Smoking status: Current Every Day Smoker     Packs/day: 1.00     Years: 30.00     Pack years: 30.00     Types: Cigarettes   • Smokeless tobacco: Former User     Types: Chew   • Tobacco comment: Trying to quit   Substance Use Topics   • Alcohol use: Yes     Frequency: Never     Comment: OCASSIONAL   • Drug use: No      ALLERGIES  Ace inhibitors; Adhesive tape; Demerol [meperidine]; Levemir [insulin detemir]; and Tresiba flextouch [insulin degludec]     MEDICATIONS  Current Outpatient Medications   Medication Sig Dispense Refill   • aspirin 81 MG EC tablet Take 81 mg by mouth Daily.     • bisacodyl (DULCOLAX) 5 MG EC tablet Take 1 mg by mouth.     • calcium carbonate (OS-AVELINO) 600 MG tablet Take 600 mg by mouth Every Other Day.     • Cholecalciferol  (VITAMIN D3) 2000 units tablet Take 1 capsule by mouth Daily.     • denosumab (PROLIA) 60 MG/ML solution syringe Inject 60 mg under the skin 1 (One) Time. EVERY 6 MONTHS     • docusate sodium (COLACE) 250 MG capsule Take 250 mg by mouth Every 12 (Twelve) Hours.     • escitalopram (LEXAPRO) 20 MG tablet Take 20 mg by mouth Daily.     • Insulin Glargine (BASAGLAR KWIKPEN SC) Inject  under the skin into the appropriate area as directed.     • LACTULOSE PO Take 10 g by mouth Every 4 (Four) Hours As Needed.     • metFORMIN (GLUCOPHAGE) 1000 MG tablet Take 1,000 mg by mouth 2 (Two) Times a Day With Meals.     • Morphine (MSIR) 15 MG tablet Take 30 mg by mouth 3 (Three) Times a Day.     • Multiple Vitamin (MULTI VITAMIN DAILY PO) Take 1 capsule by mouth Daily.     • oxyCODONE (OXY-IR) 5 MG capsule Take 15 mg by mouth. Takes every 2 hours as needed     • polyethylene glycol (MIRALAX) packet Take 17 g by mouth Daily.     • promethazine (PHENERGAN) 25 MG tablet Take 1 tablet by mouth Every 6 (Six) Hours As Needed for Nausea or Vomiting. 30 tablet 1   • senna (SENOKOT) 8.6 MG tablet Take 1 tablet by mouth.     • simvastatin (ZOCOR) 20 MG tablet Take 20 mg by mouth Every Night.     • tiZANidine (ZANAFLEX) 4 MG tablet Take 4 mg by mouth Every 8 (Eight) Hours As Needed for Muscle Spasms.       No current facility-administered medications for this visit.       The following portions of the patient's history were reviewed and updated as appropriate: allergies, current medications, past family history, past medical history, past social history, past surgical history and problem list.  Current outpatient and discharge medications have been reconciled for the patient.  Reviewed by: Mickey South III, MD    REVIEW OF SYSTEMS  Review of Systems   Constitutional: Negative.    HENT: Negative.         Sinus problems   Eyes: Negative.    Respiratory: Negative.    Cardiovascular: Negative.    Gastrointestinal: Negative.          "Constipation  Taking multiple products to help   Endocrine: Negative.    Genitourinary: Negative.    Musculoskeletal: Negative.         Still with back pain  Seeing Pain management in [place]  Some hip pain   Skin: Negative.    Allergic/Immunologic: Negative.    Neurological: Negative.    Hematological: Negative.    Psychiatric/Behavioral: Negative.      PHYSICAL EXAM  VITAL SIGNS:   Vitals:    02/05/20 1020   BP: 136/64   Pulse: 71   Resp: 18   Weight: 81.6 kg (180 lb)   Height: 157.5 cm (62\")   PainSc:   2   PainLoc: Back      General:  Alert and oriented, no acute distress, well developed, Vitals reviewed.  Head:  Normocephalic, without obvious abnormality    Nose/Sinuses:  Nares normal externally, no sinus tenderness.  Mouth/Throat:  Mucosa moist, pharynx without erythema  Neck:  supple, No evidence of adenopathy in the cervical or supraclavicular areas.  Eyes: No gross abnormalities   Ears: Ears intact with no external abnormalities noted  Chest:  Respiratory efforts are normal and unlabored, chest is clear to auscultation.  Cardiovascular: Regular rate and rhythm without murmurs, rubs, or gallops.   Abdomen:  Soft, non-tender, normal bowel sounds; no CVA tenderness   Extremities:  ROSARIO well, warm to touch, no evidence of cyanosis or edema.  Skin: No suspicious lesions or rashes of concern  Neurologic:  Alert and oriented, non focal exam, strength and sensation grossly normal  Psych: Mood and affect are appropriate    Performance Status: ECOG (0) Fully active, able to carry on all predisease performance without restriction    Clinical Quality Measures  -Pain Documented by Standardized Tool, FPS Riri CRISS Denise reports a pain score of 2.  Given her pain assessment as noted, treatment options were discussed and the following options were decided upon as a follow-up plan to address the patient's pain: continuation of current treatment plan for pain and use of non-medical modalities (ice, heat, stretching and/or " behavior modifications).    -Advanced Care Planning   -Body Mass Index Screening and Follow-Up Plan Patient's Body mass index is 32.92 kg/m². BMI is above normal parameters. Recommendations include: educational material.  -Tobacco Use: Screening and Cessation Intervention Social History    Tobacco Use      Smoking status: Current Every Day Smoker        Packs/day: 1.00        Years: 30.00        Pack years: 30        Types: Cigarettes      Smokeless tobacco: Former User        Types: Chew      Tobacco comment: Trying to quit   Smoking cessation information given in after visit summary.    ASSESSMENT AND PLAN  1. Plasmacytoma of bone (CMS/HCC)    2. Status post kyphoplasty    3. History of radiation therapy    4. Current every day smoker      RECOMMENDATIONS: Riri Denise is status post completion of radiation therapy to the L5 and presents to our clinic today for inital follow up exam. Diagnosed in July 2019 with incidental solitary plasmacytoma involving L5 vertebra causing compression fracture. Underwent kyphoplasty on May 8, 2019 revealing a solitary extramedullary plasmacytoma.  . CRAB Criteria: Calcium 10.2, Renal/Creatinine 0.60, Anemia/Hgb 14.4, Bone Lesions: solitary extramedullary plasma cytoma without lytic or bony lesions. Follows Dr. Kacy Garcia anticipates radiation therapy and routine monitoring with serology studies. She completed radiation therapy, 5040 cGy in 28 fractions to L5 on 09/19/2019.     Pt is without symptoms or evidence for recurrent or metastatic disease at this time and denies untoward side effects from treatment.I will not schedule a routine follow up appointment in clinic at this time, will be happy to see patient in the future if needed. Continue to follow with MDs at [place] and .     Patient Instructions   1)  Return to Dr. South only as needed, no routine follow up  2)  Follow at [facility] and with Dr. Garcia as scheduled    Todays appointment time  was spent in counseling, coordination of care and surveillance related to patients diagnosis as well as radiation therapy possible and probable after effects.   Mickey South III, MD  02/05/2020

## 2020-02-05 ENCOUNTER — HOSPITAL ENCOUNTER (OUTPATIENT)
Dept: RADIATION ONCOLOGY | Facility: HOSPITAL | Age: 55
Setting detail: RADIATION/ONCOLOGY SERIES
End: 2020-02-05

## 2020-02-05 ENCOUNTER — OFFICE VISIT (OUTPATIENT)
Dept: RADIATION ONCOLOGY | Facility: HOSPITAL | Age: 55
End: 2020-02-05

## 2020-02-05 VITALS
SYSTOLIC BLOOD PRESSURE: 136 MMHG | WEIGHT: 180 LBS | HEART RATE: 71 BPM | DIASTOLIC BLOOD PRESSURE: 64 MMHG | HEIGHT: 62 IN | RESPIRATION RATE: 18 BRPM | BODY MASS INDEX: 33.13 KG/M2

## 2020-02-05 DIAGNOSIS — F17.200 CURRENT EVERY DAY SMOKER: ICD-10-CM

## 2020-02-05 DIAGNOSIS — Z98.890 STATUS POST KYPHOPLASTY: ICD-10-CM

## 2020-02-05 DIAGNOSIS — C90.30 PLASMACYTOMA OF BONE (HCC): Primary | ICD-10-CM

## 2020-02-05 DIAGNOSIS — Z92.3 HISTORY OF RADIATION THERAPY: ICD-10-CM

## 2020-02-05 PROBLEM — Z79.891 LONG TERM (CURRENT) USE OF OPIATE ANALGESIC: Status: ACTIVE | Noted: 2019-09-17

## 2020-02-05 PROCEDURE — G0463 HOSPITAL OUTPT CLINIC VISIT: HCPCS | Performed by: RADIOLOGY

## 2020-02-05 RX ORDER — POLYETHYLENE GLYCOL 3350 17 G/17G
17 POWDER, FOR SOLUTION ORAL DAILY
COMMUNITY

## 2020-02-05 RX ORDER — BISACODYL 5 MG/1
1 TABLET, DELAYED RELEASE ORAL
COMMUNITY

## 2020-02-05 RX ORDER — ESCITALOPRAM OXALATE 20 MG/1
20 TABLET ORAL DAILY
COMMUNITY
Start: 2020-02-03 | End: 2020-05-04

## 2020-02-05 RX ORDER — SENNA PLUS 8.6 MG/1
1 TABLET ORAL
COMMUNITY

## 2020-02-05 RX ORDER — DOCUSATE SODIUM 250 MG
250 CAPSULE ORAL EVERY 12 HOURS
COMMUNITY
Start: 2020-02-03

## 2020-02-05 NOTE — PATIENT INSTRUCTIONS
1)  Return to Dr. South only as needed, no routine follow up  2)  Follow at [facility] and with Dr. Garcia as scheduled

## 2020-02-21 LAB
CALCIUM SERPL-MCNC: 10.1 MG/DL (ref 8.6–10)
VITAMIN D 25-HYDROXY: 70.5 NG/ML

## 2020-03-06 ENCOUNTER — OFFICE VISIT (OUTPATIENT)
Dept: HEMATOLOGY | Age: 55
End: 2020-03-06
Payer: COMMERCIAL

## 2020-03-06 ENCOUNTER — HOSPITAL ENCOUNTER (OUTPATIENT)
Dept: INFUSION THERAPY | Age: 55
Discharge: HOME | End: 2020-03-06
Payer: COMMERCIAL

## 2020-03-06 VITALS
DIASTOLIC BLOOD PRESSURE: 78 MMHG | SYSTOLIC BLOOD PRESSURE: 132 MMHG | BODY MASS INDEX: 33.2 KG/M2 | OXYGEN SATURATION: 96 % | WEIGHT: 180.4 LBS | HEIGHT: 62 IN | HEART RATE: 73 BPM | TEMPERATURE: 98.2 F

## 2020-03-06 PROCEDURE — 99211 OFF/OP EST MAY X REQ PHY/QHP: CPT

## 2020-03-06 PROCEDURE — 85025 COMPLETE CBC W/AUTO DIFF WBC: CPT

## 2020-03-06 PROCEDURE — 80053 COMPREHEN METABOLIC PANEL: CPT

## 2020-03-06 PROCEDURE — G8427 DOCREV CUR MEDS BY ELIG CLIN: HCPCS | Performed by: NURSE PRACTITIONER

## 2020-03-06 PROCEDURE — G8482 FLU IMMUNIZE ORDER/ADMIN: HCPCS | Performed by: NURSE PRACTITIONER

## 2020-03-06 PROCEDURE — 4004F PT TOBACCO SCREEN RCVD TLK: CPT | Performed by: NURSE PRACTITIONER

## 2020-03-06 PROCEDURE — G8417 CALC BMI ABV UP PARAM F/U: HCPCS | Performed by: NURSE PRACTITIONER

## 2020-03-06 PROCEDURE — 3017F COLORECTAL CA SCREEN DOC REV: CPT | Performed by: NURSE PRACTITIONER

## 2020-03-06 PROCEDURE — 99213 OFFICE O/P EST LOW 20 MIN: CPT | Performed by: NURSE PRACTITIONER

## 2020-03-06 ASSESSMENT — ENCOUNTER SYMPTOMS
EYE REDNESS: 0
BLOOD IN STOOL: 0
CONSTIPATION: 0
EYES NEGATIVE: 1
EYE DISCHARGE: 0
COUGH: 0
VOMITING: 0
WHEEZING: 0
EYE PAIN: 0
SORE THROAT: 0
DIARRHEA: 0
GASTROINTESTINAL NEGATIVE: 1
RESPIRATORY NEGATIVE: 1
SHORTNESS OF BREATH: 0
NAUSEA: 0
ABDOMINAL PAIN: 0
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
## 2020-03-10 ENCOUNTER — CLINICAL DOCUMENTATION (OUTPATIENT)
Dept: HEMATOLOGY | Age: 55
End: 2020-03-10

## 2020-03-10 ENCOUNTER — TELEPHONE (OUTPATIENT)
Dept: HEMATOLOGY | Age: 55
End: 2020-03-10

## 2020-03-10 ASSESSMENT — ENCOUNTER SYMPTOMS: BACK PAIN: 1

## 2020-03-10 NOTE — TELEPHONE ENCOUNTER
Called and tried to get medical records from [place] [id number] and they need a signed release from patient. I called patient and she is going to go there and get it signed so we can get records from the past year from her radiology and office notes.

## 2020-04-14 ENCOUNTER — TELEPHONE (OUTPATIENT)
Dept: INFUSION THERAPY | Age: 55
End: 2020-04-14

## 2020-04-14 NOTE — TELEPHONE ENCOUNTER
Msg left for Antonella Angeles regarding chart message. Per Julia keep apt 7/10/2020, labs from 3/6/2020 normal, and follow up with pain clinic to see if they want Xrays.

## 2020-04-27 RX ORDER — SIMVASTATIN 20 MG
TABLET ORAL
Qty: 90 TABLET | Refills: 0 | Status: SHIPPED | OUTPATIENT
Start: 2020-04-27 | End: 2020-07-24

## 2020-07-10 ENCOUNTER — HOSPITAL ENCOUNTER (OUTPATIENT)
Dept: INFUSION THERAPY | Age: 55
Discharge: HOME | End: 2020-07-10
Payer: COMMERCIAL

## 2020-07-10 ENCOUNTER — OFFICE VISIT (OUTPATIENT)
Dept: HEMATOLOGY | Age: 55
End: 2020-07-10
Payer: COMMERCIAL

## 2020-07-10 VITALS
WEIGHT: 181.6 LBS | DIASTOLIC BLOOD PRESSURE: 80 MMHG | HEIGHT: 62 IN | TEMPERATURE: 98.6 F | OXYGEN SATURATION: 97 % | BODY MASS INDEX: 33.42 KG/M2 | HEART RATE: 76 BPM | SYSTOLIC BLOOD PRESSURE: 130 MMHG

## 2020-07-10 DIAGNOSIS — C90.30 SOLITARY PLASMACYTOMA OF BONE (HCC): ICD-10-CM

## 2020-07-10 LAB
ALBUMIN SERPL-MCNC: 4.5 G/DL (ref 3.5–5.2)
ALP BLD-CCNC: 77 U/L (ref 35–104)
ALT SERPL-CCNC: 14 U/L (ref 9–52)
ANION GAP SERPL CALCULATED.3IONS-SCNC: 8 MMOL/L (ref 7–19)
AST SERPL-CCNC: 17 U/L (ref 14–36)
BASOPHILS ABSOLUTE: 0.04 K/UL (ref 0.01–0.08)
BASOPHILS RELATIVE PERCENT: 0.5 % (ref 0.1–1.2)
BILIRUB SERPL-MCNC: 0.6 MG/DL (ref 0.2–1.3)
BUN BLDV-MCNC: 13 MG/DL (ref 7–17)
CALCIUM SERPL-MCNC: 9.9 MG/DL (ref 8.4–10.2)
CHLORIDE BLD-SCNC: 102 MMOL/L (ref 98–111)
CO2: 32 MMOL/L (ref 22–29)
CREAT SERPL-MCNC: 0.7 MG/DL (ref 0.5–1)
EOSINOPHILS ABSOLUTE: 0.48 K/UL (ref 0.04–0.54)
EOSINOPHILS RELATIVE PERCENT: 6.5 % (ref 0.7–7)
GFR NON-AFRICAN AMERICAN: >60
GLOBULIN: 3 G/DL
GLUCOSE BLD-MCNC: 111 MG/DL (ref 74–106)
HCT VFR BLD CALC: 47.1 % (ref 34.1–44.9)
HEMOGLOBIN: 15.5 G/DL (ref 11.2–15.7)
LYMPHOCYTES ABSOLUTE: 1.45 K/UL (ref 1.18–3.74)
LYMPHOCYTES RELATIVE PERCENT: 19.6 % (ref 19.3–53.1)
MCH RBC QN AUTO: 30.9 PG (ref 25.6–32.2)
MCHC RBC AUTO-ENTMCNC: 32.9 G/DL (ref 32.3–35.5)
MCV RBC AUTO: 94 FL (ref 79.4–94.8)
MONOCYTES ABSOLUTE: 0.34 K/UL (ref 0.24–0.82)
MONOCYTES RELATIVE PERCENT: 4.6 % (ref 4.7–12.5)
NEUTROPHILS ABSOLUTE: 5.07 K/UL (ref 1.56–6.13)
NEUTROPHILS RELATIVE PERCENT: 68.8 % (ref 34–71.1)
PDW BLD-RTO: 13.3 % (ref 11.7–14.4)
PLATELET # BLD: 197 K/UL (ref 182–369)
PMV BLD AUTO: 9.4 FL (ref 7.4–10.4)
POTASSIUM SERPL-SCNC: 4.3 MMOL/L (ref 3.5–5.1)
RBC # BLD: 5.01 M/UL (ref 3.93–5.22)
SODIUM BLD-SCNC: 142 MMOL/L (ref 137–145)
TOTAL PROTEIN: 7.5 G/DL (ref 6.3–8.2)
WBC # BLD: 7.38 K/UL (ref 3.98–10.04)

## 2020-07-10 PROCEDURE — G8427 DOCREV CUR MEDS BY ELIG CLIN: HCPCS | Performed by: NURSE PRACTITIONER

## 2020-07-10 PROCEDURE — 99212 OFFICE O/P EST SF 10 MIN: CPT

## 2020-07-10 PROCEDURE — 80053 COMPREHEN METABOLIC PANEL: CPT

## 2020-07-10 PROCEDURE — 3017F COLORECTAL CA SCREEN DOC REV: CPT | Performed by: NURSE PRACTITIONER

## 2020-07-10 PROCEDURE — 85025 COMPLETE CBC W/AUTO DIFF WBC: CPT

## 2020-07-10 PROCEDURE — G8417 CALC BMI ABV UP PARAM F/U: HCPCS | Performed by: NURSE PRACTITIONER

## 2020-07-10 PROCEDURE — 4004F PT TOBACCO SCREEN RCVD TLK: CPT | Performed by: NURSE PRACTITIONER

## 2020-07-10 PROCEDURE — 99213 OFFICE O/P EST LOW 20 MIN: CPT | Performed by: NURSE PRACTITIONER

## 2020-07-10 RX ORDER — OMEPRAZOLE 20 MG/1
20 CAPSULE, DELAYED RELEASE ORAL DAILY
COMMUNITY
End: 2020-12-18

## 2020-07-10 RX ORDER — GABAPENTIN 300 MG/1
300 CAPSULE ORAL 3 TIMES DAILY
COMMUNITY
End: 2020-10-29

## 2020-07-10 ASSESSMENT — ENCOUNTER SYMPTOMS
EYE PAIN: 0
NAUSEA: 0
EYE REDNESS: 0
WHEEZING: 0
SHORTNESS OF BREATH: 0
COUGH: 0
EYES NEGATIVE: 1
BACK PAIN: 1
VOMITING: 0
RESPIRATORY NEGATIVE: 1
ABDOMINAL PAIN: 0
BLOOD IN STOOL: 0
SORE THROAT: 0
EYE DISCHARGE: 0
DIARRHEA: 0
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
## 2020-07-15 ASSESSMENT — ENCOUNTER SYMPTOMS: CONSTIPATION: 1

## 2020-07-16 ENCOUNTER — HOSPITAL ENCOUNTER (OUTPATIENT)
Dept: CT IMAGING | Age: 55
Discharge: HOME | End: 2020-07-16
Payer: COMMERCIAL

## 2020-07-16 PROCEDURE — 6360000004 HC RX CONTRAST MEDICATION: Performed by: NURSE PRACTITIONER

## 2020-07-16 PROCEDURE — 74177 CT ABD & PELVIS W/CONTRAST: CPT

## 2020-07-16 RX ADMIN — IOPAMIDOL 75 ML: 755 INJECTION, SOLUTION INTRAVENOUS at 11:13

## 2020-07-24 RX ORDER — SIMVASTATIN 20 MG
TABLET ORAL
Qty: 90 TABLET | Refills: 0 | Status: SHIPPED | OUTPATIENT
Start: 2020-07-24 | End: 2020-10-29 | Stop reason: SDUPTHER

## 2020-10-17 ENCOUNTER — APPOINTMENT (OUTPATIENT)
Dept: GENERAL RADIOLOGY | Age: 55
End: 2020-10-17
Payer: COMMERCIAL

## 2020-10-17 ENCOUNTER — HOSPITAL ENCOUNTER (EMERGENCY)
Age: 55
Discharge: HOME | End: 2020-10-17
Payer: COMMERCIAL

## 2020-10-17 VITALS
SYSTOLIC BLOOD PRESSURE: 145 MMHG | HEART RATE: 78 BPM | OXYGEN SATURATION: 98 % | RESPIRATION RATE: 18 BRPM | TEMPERATURE: 98.4 F | DIASTOLIC BLOOD PRESSURE: 85 MMHG

## 2020-10-17 PROCEDURE — 6360000002 HC RX W HCPCS: Performed by: NURSE PRACTITIONER

## 2020-10-17 PROCEDURE — 72072 X-RAY EXAM THORAC SPINE 3VWS: CPT

## 2020-10-17 PROCEDURE — 96372 THER/PROPH/DIAG INJ SC/IM: CPT

## 2020-10-17 PROCEDURE — 72100 X-RAY EXAM L-S SPINE 2/3 VWS: CPT

## 2020-10-17 PROCEDURE — 99283 EMERGENCY DEPT VISIT LOW MDM: CPT

## 2020-10-17 PROCEDURE — 99282 EMERGENCY DEPT VISIT SF MDM: CPT

## 2020-10-17 PROCEDURE — 99999 PR OFFICE/OUTPT VISIT,PROCEDURE ONLY: CPT | Performed by: NURSE PRACTITIONER

## 2020-10-17 RX ORDER — ONDANSETRON 2 MG/ML
4 INJECTION INTRAMUSCULAR; INTRAVENOUS ONCE
Status: DISCONTINUED | OUTPATIENT
Start: 2020-10-17 | End: 2020-10-17

## 2020-10-17 RX ORDER — ONDANSETRON 2 MG/ML
4 INJECTION INTRAMUSCULAR; INTRAVENOUS ONCE
Status: COMPLETED | OUTPATIENT
Start: 2020-10-17 | End: 2020-10-17

## 2020-10-17 RX ORDER — HYDROMORPHONE HYDROCHLORIDE 1 MG/ML
1 INJECTION, SOLUTION INTRAMUSCULAR; INTRAVENOUS; SUBCUTANEOUS ONCE
Status: COMPLETED | OUTPATIENT
Start: 2020-10-17 | End: 2020-10-17

## 2020-10-17 RX ORDER — HYDROMORPHONE HYDROCHLORIDE 1 MG/ML
1 INJECTION, SOLUTION INTRAMUSCULAR; INTRAVENOUS; SUBCUTANEOUS ONCE
Status: DISCONTINUED | OUTPATIENT
Start: 2020-10-17 | End: 2020-10-17

## 2020-10-17 RX ADMIN — ONDANSETRON 4 MG: 2 INJECTION INTRAMUSCULAR; INTRAVENOUS at 12:13

## 2020-10-17 RX ADMIN — HYDROMORPHONE HYDROCHLORIDE 1 MG: 1 INJECTION, SOLUTION INTRAMUSCULAR; INTRAVENOUS; SUBCUTANEOUS at 12:12

## 2020-10-17 ASSESSMENT — ENCOUNTER SYMPTOMS
TROUBLE SWALLOWING: 0
ABDOMINAL PAIN: 0
SHORTNESS OF BREATH: 0
DIARRHEA: 0
COUGH: 0
BACK PAIN: 1
NAUSEA: 0
SINUS PRESSURE: 0
SORE THROAT: 0
VOMITING: 0
CONSTIPATION: 0
RHINORRHEA: 0

## 2020-10-17 ASSESSMENT — PAIN SCALES - GENERAL
PAINLEVEL_OUTOF10: 7
PAINLEVEL_OUTOF10: 7
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
## 2020-10-20 ENCOUNTER — HOSPITAL ENCOUNTER (OUTPATIENT)
Dept: INFUSION THERAPY | Age: 55
Discharge: HOME | End: 2020-10-20
Payer: COMMERCIAL

## 2020-10-20 ENCOUNTER — OFFICE VISIT (OUTPATIENT)
Dept: HEMATOLOGY | Age: 55
End: 2020-10-20
Payer: COMMERCIAL

## 2020-10-20 VITALS
WEIGHT: 183.9 LBS | BODY MASS INDEX: 33.84 KG/M2 | OXYGEN SATURATION: 97 % | HEIGHT: 62 IN | DIASTOLIC BLOOD PRESSURE: 76 MMHG | SYSTOLIC BLOOD PRESSURE: 120 MMHG | HEART RATE: 74 BPM | TEMPERATURE: 96.9 F

## 2020-10-20 DIAGNOSIS — C90.30 SOLITARY PLASMACYTOMA OF BONE (HCC): ICD-10-CM

## 2020-10-20 LAB
BASOPHILS ABSOLUTE: 0.05 K/UL (ref 0.01–0.08)
BASOPHILS RELATIVE PERCENT: 0.8 % (ref 0.1–1.2)
EOSINOPHILS ABSOLUTE: 0.55 K/UL (ref 0.04–0.54)
EOSINOPHILS RELATIVE PERCENT: 9.2 % (ref 0.7–7)
HCT VFR BLD CALC: 47.1 % (ref 34.1–44.9)
HEMOGLOBIN: 15.1 G/DL (ref 11.2–15.7)
LYMPHOCYTES ABSOLUTE: 1.26 K/UL (ref 1.18–3.74)
LYMPHOCYTES RELATIVE PERCENT: 21 % (ref 19.3–53.1)
MCH RBC QN AUTO: 30.9 PG (ref 25.6–32.2)
MCHC RBC AUTO-ENTMCNC: 32.1 G/DL (ref 32.3–35.5)
MCV RBC AUTO: 96.5 FL (ref 79.4–94.8)
MONOCYTES ABSOLUTE: 0.45 K/UL (ref 0.24–0.82)
MONOCYTES RELATIVE PERCENT: 7.5 % (ref 4.7–12.5)
NEUTROPHILS ABSOLUTE: 3.68 K/UL (ref 1.56–6.13)
NEUTROPHILS RELATIVE PERCENT: 61.5 % (ref 34–71.1)
PDW BLD-RTO: 12.9 % (ref 11.7–14.4)
PLATELET # BLD: 177 K/UL (ref 182–369)
PMV BLD AUTO: 8.9 FL (ref 7.4–10.4)
RBC # BLD: 4.88 M/UL (ref 3.93–5.22)
WBC # BLD: 5.99 K/UL (ref 3.98–10.04)

## 2020-10-20 PROCEDURE — G8484 FLU IMMUNIZE NO ADMIN: HCPCS | Performed by: NURSE PRACTITIONER

## 2020-10-20 PROCEDURE — 3017F COLORECTAL CA SCREEN DOC REV: CPT | Performed by: NURSE PRACTITIONER

## 2020-10-20 PROCEDURE — G8427 DOCREV CUR MEDS BY ELIG CLIN: HCPCS | Performed by: NURSE PRACTITIONER

## 2020-10-20 PROCEDURE — 99212 OFFICE O/P EST SF 10 MIN: CPT | Performed by: NURSE PRACTITIONER

## 2020-10-20 PROCEDURE — 4004F PT TOBACCO SCREEN RCVD TLK: CPT | Performed by: NURSE PRACTITIONER

## 2020-10-20 PROCEDURE — 99211 OFF/OP EST MAY X REQ PHY/QHP: CPT

## 2020-10-20 PROCEDURE — G8417 CALC BMI ABV UP PARAM F/U: HCPCS | Performed by: NURSE PRACTITIONER

## 2020-10-20 PROCEDURE — 85025 COMPLETE CBC W/AUTO DIFF WBC: CPT

## 2020-10-20 ASSESSMENT — ENCOUNTER SYMPTOMS
DIARRHEA: 0
NAUSEA: 0
COUGH: 0
BACK PAIN: 1
SORE THROAT: 0
EYE PAIN: 0
RESPIRATORY NEGATIVE: 1
VOMITING: 0
EYE DISCHARGE: 0
EYES NEGATIVE: 1
EYE REDNESS: 0
BLOOD IN STOOL: 0
ABDOMINAL PAIN: 0
WHEEZING: 0
SHORTNESS OF BREATH: 0
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
## 2020-10-22 ASSESSMENT — ENCOUNTER SYMPTOMS
CONSTIPATION: 0
GASTROINTESTINAL NEGATIVE: 1

## 2020-10-29 ENCOUNTER — VIRTUAL VISIT (OUTPATIENT)
Dept: FAMILY MEDICINE CLINIC | Age: 55
End: 2020-10-29
Payer: COMMERCIAL

## 2020-10-29 PROCEDURE — 99442 PR PHYS/QHP TELEPHONE EVALUATION 11-20 MIN: CPT | Performed by: NURSE PRACTITIONER

## 2020-10-29 RX ORDER — INSULIN GLARGINE 100 [IU]/ML
35 INJECTION, SOLUTION SUBCUTANEOUS NIGHTLY
Qty: 8 PEN | Refills: 5 | Status: SHIPPED
Start: 2020-10-29 | End: 2020-11-03 | Stop reason: ALTCHOICE

## 2020-10-29 RX ORDER — LANOLIN ALCOHOL/MO/W.PET/CERES
3 CREAM (GRAM) TOPICAL DAILY
Qty: 30 TABLET | Refills: 3
Start: 2020-10-29

## 2020-10-29 RX ORDER — SIMVASTATIN 20 MG
TABLET ORAL
Qty: 90 TABLET | Refills: 1 | Status: SHIPPED | OUTPATIENT
Start: 2020-10-29 | End: 2021-06-14

## 2020-10-29 RX ORDER — CITALOPRAM 20 MG/1
20 TABLET ORAL DAILY
Qty: 30 TABLET | Refills: 5
Start: 2020-10-29 | End: 2021-04-01

## 2020-10-29 RX ORDER — CLOTRIMAZOLE AND BETAMETHASONE DIPROPIONATE 10; .64 MG/G; MG/G
CREAM TOPICAL
Qty: 45 G | Refills: 5 | Status: SHIPPED | OUTPATIENT
Start: 2020-10-29 | End: 2022-09-09 | Stop reason: SDUPTHER

## 2020-10-29 RX ORDER — GABAPENTIN 400 MG/1
800 CAPSULE ORAL 3 TIMES DAILY
Qty: 906 CAPSULE | Refills: 0
Start: 2020-10-29 | End: 2020-12-18

## 2020-10-29 RX ORDER — ONDANSETRON 8 MG/1
8 TABLET, ORALLY DISINTEGRATING ORAL EVERY 8 HOURS PRN
Qty: 20 TABLET | Refills: 0
Start: 2020-10-29 | End: 2021-04-01 | Stop reason: ALTCHOICE

## 2020-10-29 NOTE — PROGRESS NOTES
Juliet Joyner is a 54 y.o. female evaluated via telephone on 10/29/2020. Consent:  She and/or health care decision maker is aware that that she may receive a bill for this telephone service, depending on her insurance coverage, and has provided verbal consent to proceed: Yes      Documentation:  I communicated with the patient and/or health care decision maker about DM,   DM is doing great checking Glucose .  200 eating sweets. Was after eating. Stop Metformin in the morning cause stomach upset. Taking 35 units of Marilou Gola has gone down due to low glucose. Now that pain control no longer needs HTN med. Still taking omeprazole before meds in the morning. Having sweating middle of the night. Recommend check glucose in the middle of the night   Cholesterol out of med. Needing labs. Seeing pain management helping a lot. Talking surgery again. Breaking ribs don't know what it is related to   [facility] watching remission for MM. Details of this discussion including any medical advice provided: see above      Diagnosis Orders   1. Fungal dermatitis  clotrimazole-betamethasone (LOTRISONE) 1-0.05 % cream   2. Hyperlipidemia, unspecified hyperlipidemia type  simvastatin (ZOCOR) 20 MG tablet   3. Chronic bilateral low back pain with bilateral sciatica  gabapentin (NEURONTIN) 400 MG capsule   4. Anxiety  citalopram (CELEXA) 20 MG tablet   5. Nausea  ondansetron (ZOFRAN-ODT) 8 MG TBDP disintegrating tablet   6.  Insomnia, unspecified type  melatonin (RA MELATONIN) 3 MG TABS tablet     She is to get her labs done that was ordered in August I was able to look at her CBC CMP she has had some more fractures of her ribs and her calcium level was really high so they have taken off a lot of her calcium and there also is some concern about the Prolia she needs fungal cream for skin folds and we have updated her med list  I affirm this is a Patient Initiated Episode with a Patient who has not had a related appointment within my department in the past 7 days or scheduled within the next 24 hours.     Patient identification was verified at the start of the visit: Yes    Total Time: minutes: 21-30 minutes    Note: not billable if this call serves to triage the patient into an appointment for the relevant concern      Sindhu Murcia

## 2020-11-02 LAB
ALBUMIN SERPL-MCNC: 4.3 G/DL (ref 3.5–5.2)
ALP BLD-CCNC: 93 U/L (ref 35–104)
ALT SERPL-CCNC: 11 U/L (ref 5–33)
ANION GAP SERPL CALCULATED.3IONS-SCNC: 10 MMOL/L (ref 7–19)
AST SERPL-CCNC: 14 U/L (ref 5–32)
BILIRUB SERPL-MCNC: 0.3 MG/DL (ref 0.2–1.2)
BUN BLDV-MCNC: 20 MG/DL (ref 6–20)
CALCIUM SERPL-MCNC: 9.5 MG/DL (ref 8.6–10)
CHLORIDE BLD-SCNC: 102 MMOL/L (ref 98–111)
CHOLESTEROL, TOTAL: 167 MG/DL (ref 160–199)
CO2: 27 MMOL/L (ref 22–29)
CREAT SERPL-MCNC: 0.5 MG/DL (ref 0.5–0.9)
CREATININE URINE: 276.3 MG/DL (ref 4.2–622)
GFR AFRICAN AMERICAN: >59
GFR NON-AFRICAN AMERICAN: >60
GLUCOSE BLD-MCNC: 73 MG/DL (ref 74–109)
HBA1C MFR BLD: 5.6 % (ref 4–6)
HCT VFR BLD CALC: 46.8 % (ref 37–47)
HDLC SERPL-MCNC: 47 MG/DL (ref 65–121)
HEMOGLOBIN: 14.8 G/DL (ref 12–16)
LDL CHOLESTEROL CALCULATED: 89 MG/DL
MCH RBC QN AUTO: 30 PG (ref 27–31)
MCHC RBC AUTO-ENTMCNC: 31.6 G/DL (ref 33–37)
MCV RBC AUTO: 94.7 FL (ref 81–99)
MICROALBUMIN UR-MCNC: <1.2 MG/DL (ref 0–19)
MICROALBUMIN/CREAT UR-RTO: NORMAL MG/G
PDW BLD-RTO: 12.7 % (ref 11.5–14.5)
PLATELET # BLD: 178 K/UL (ref 130–400)
PMV BLD AUTO: 10.2 FL (ref 9.4–12.3)
POTASSIUM SERPL-SCNC: 4.5 MMOL/L (ref 3.5–5)
RBC # BLD: 4.94 M/UL (ref 4.2–5.4)
SODIUM BLD-SCNC: 139 MMOL/L (ref 136–145)
TOTAL PROTEIN: 7.3 G/DL (ref 6.6–8.7)
TRIGL SERPL-MCNC: 157 MG/DL (ref 0–149)
WBC # BLD: 5.3 K/UL (ref 4.8–10.8)

## 2020-11-23 ENCOUNTER — E-VISIT (OUTPATIENT)
Dept: FAMILY MEDICINE CLINIC | Age: 55
End: 2020-11-23
Payer: COMMERCIAL

## 2020-11-23 PROCEDURE — 98971 NQHP OL DIG ASSMT&MGMT 11-20: CPT | Performed by: NURSE PRACTITIONER

## 2020-11-23 RX ORDER — INSULIN GLARGINE 100 [IU]/ML
35 INJECTION, SOLUTION SUBCUTANEOUS NIGHTLY
Qty: 8 PEN | Refills: 0 | Status: SHIPPED | OUTPATIENT
Start: 2020-11-23 | End: 2020-12-18 | Stop reason: SDUPTHER

## 2020-11-23 RX ORDER — PEN NEEDLE, DIABETIC 31 G X1/4"
1 NEEDLE, DISPOSABLE MISCELLANEOUS DAILY
Qty: 100 EACH | Refills: 3 | Status: SHIPPED | OUTPATIENT
Start: 2020-11-23 | End: 2021-08-25 | Stop reason: ALTCHOICE

## 2020-11-23 NOTE — PROGRESS NOTES
S:pt is reporting glucose range to be 100s to 119 fasting and 2 hours after meal 150s to 200. Patient has reported that ranges 1 20-1 49 with random blood sugar checks. There is been no report of hypoglycemia. Patient has reported that she does need refill on long-acting, Basaglar, pen. O-EVISIT     Diagnosis Orders   1. Insulin-requiring or dependent type II diabetes mellitus (HCC)  insulin glargine (BASAGLAR KWIKPEN) 100 UNIT/ML injection pen    Insulin Pen Needle ([employer] PEN NEEDLES) 31G X 6 MM MISC   In office f/u with pcp is needed. Zeyad Gray, I have reviewed the questions and answers that you have submitted. I have found your Dolph Dario in your chart and reordered to your listed pharmacy along with more pen needles. The nurse I work with has tried to contact you today regarding the message that you sent earlier in the day or this weekend. I was curious about the symptoms that you are having with the dose of insulin you are taking and also recommend that you would follow-up with your primary care provider for an \"in office\" follow-up visit. I do see that you saw Jalyn Nguyen at your last visit/virtual visit. And, then I also see that you do have Dr. Colleen Walker listed as your primary care provider. I would encourage you to follow-up with provider of your choice in relation to overall health care, lab work, and office visit assessment. If you are noting hypoglycemia when using Basaglar, I would not continue with the 35 units, but instead decrease to 30 units and monitor blood sugar closely. Bring your blood sugar log to your follow-up in office visit. Please feel free to return the call to the nurse who will be in the office on Tuesday this week. The phone number is [phone]. Thank you! 11-20 minutes were spent on the digital evaluation and management of this patient.

## 2020-12-18 ENCOUNTER — OFFICE VISIT (OUTPATIENT)
Dept: FAMILY MEDICINE CLINIC | Age: 55
End: 2020-12-18
Payer: COMMERCIAL

## 2020-12-18 VITALS
OXYGEN SATURATION: 94 % | WEIGHT: 200 LBS | SYSTOLIC BLOOD PRESSURE: 138 MMHG | TEMPERATURE: 97.1 F | HEART RATE: 65 BPM | BODY MASS INDEX: 36.58 KG/M2 | DIASTOLIC BLOOD PRESSURE: 78 MMHG | RESPIRATION RATE: 16 BRPM

## 2020-12-18 DIAGNOSIS — R39.15 URINARY URGENCY: ICD-10-CM

## 2020-12-18 LAB
BILIRUBIN URINE: ABNORMAL
BLOOD, URINE: NEGATIVE
CLARITY: CLEAR
COLOR: ABNORMAL
GLUCOSE URINE: NEGATIVE MG/DL
KETONES, URINE: ABNORMAL MG/DL
LEUKOCYTE ESTERASE, URINE: NEGATIVE
NITRITE, URINE: NEGATIVE
PH UA: 6 (ref 5–8)
PROTEIN UA: NEGATIVE MG/DL
SPECIFIC GRAVITY UA: >=1.03 (ref 1–1.03)
URINE REFLEX TO CULTURE: NO
URINE TYPE: ABNORMAL
UROBILINOGEN, URINE: 0.2 E.U./DL

## 2020-12-18 PROCEDURE — G8482 FLU IMMUNIZE ORDER/ADMIN: HCPCS | Performed by: NURSE PRACTITIONER

## 2020-12-18 PROCEDURE — 2022F DILAT RTA XM EVC RTNOPTHY: CPT | Performed by: NURSE PRACTITIONER

## 2020-12-18 PROCEDURE — G8417 CALC BMI ABV UP PARAM F/U: HCPCS | Performed by: NURSE PRACTITIONER

## 2020-12-18 PROCEDURE — G8427 DOCREV CUR MEDS BY ELIG CLIN: HCPCS | Performed by: NURSE PRACTITIONER

## 2020-12-18 PROCEDURE — 4004F PT TOBACCO SCREEN RCVD TLK: CPT | Performed by: NURSE PRACTITIONER

## 2020-12-18 PROCEDURE — 3017F COLORECTAL CA SCREEN DOC REV: CPT | Performed by: NURSE PRACTITIONER

## 2020-12-18 PROCEDURE — 99213 OFFICE O/P EST LOW 20 MIN: CPT | Performed by: NURSE PRACTITIONER

## 2020-12-18 PROCEDURE — 90471 IMMUNIZATION ADMIN: CPT | Performed by: NURSE PRACTITIONER

## 2020-12-18 PROCEDURE — 3044F HG A1C LEVEL LT 7.0%: CPT | Performed by: NURSE PRACTITIONER

## 2020-12-18 PROCEDURE — 90686 IIV4 VACC NO PRSV 0.5 ML IM: CPT | Performed by: NURSE PRACTITIONER

## 2020-12-18 RX ORDER — LACTULOSE 10 G/15ML
SOLUTION ORAL
COMMUNITY
Start: 2020-11-11

## 2020-12-18 RX ORDER — MORPHINE SULFATE 100 MG/1
100 TABLET ORAL
COMMUNITY
Start: 2020-12-17

## 2020-12-18 RX ORDER — ONDANSETRON HYDROCHLORIDE 8 MG/1
8 TABLET, FILM COATED ORAL
COMMUNITY
Start: 2020-12-03

## 2020-12-18 RX ORDER — OXYCODONE HYDROCHLORIDE 15 MG/1
15 TABLET ORAL
COMMUNITY
Start: 2020-12-17

## 2020-12-18 RX ORDER — ESCITALOPRAM OXALATE 20 MG/1
20 TABLET ORAL DAILY
COMMUNITY
Start: 2020-11-11

## 2020-12-18 RX ORDER — INSULIN GLARGINE 100 [IU]/ML
35 INJECTION, SOLUTION SUBCUTANEOUS NIGHTLY
Qty: 8 PEN | Refills: 3 | Status: SHIPPED | OUTPATIENT
Start: 2020-12-18 | End: 2021-07-14 | Stop reason: ALTCHOICE

## 2020-12-18 RX ORDER — CELECOXIB 200 MG/1
200 CAPSULE ORAL DAILY
COMMUNITY
Start: 2020-12-03 | End: 2022-07-16

## 2020-12-18 RX ORDER — LIDOCAINE 50 MG/G
PATCH TOPICAL
COMMUNITY
Start: 2020-12-03

## 2020-12-18 RX ORDER — OMEPRAZOLE 40 MG/1
40 CAPSULE, DELAYED RELEASE ORAL DAILY
COMMUNITY
Start: 2020-12-08

## 2020-12-18 RX ORDER — SENNA PLUS 8.6 MG/1
1 TABLET ORAL PRN
COMMUNITY

## 2020-12-18 RX ORDER — GABAPENTIN 600 MG/1
TABLET ORAL
COMMUNITY
Start: 2020-12-08

## 2020-12-18 RX ORDER — PSEUDOEPHEDRINE HCL 30 MG
250 TABLET ORAL EVERY 12 HOURS
COMMUNITY
Start: 2020-11-12

## 2020-12-18 SDOH — ECONOMIC STABILITY: TRANSPORTATION INSECURITY
IN THE PAST 12 MONTHS, HAS LACK OF TRANSPORTATION KEPT YOU FROM MEETINGS, WORK, OR FROM GETTING THINGS NEEDED FOR DAILY LIVING?: NO

## 2020-12-18 SDOH — ECONOMIC STABILITY: TRANSPORTATION INSECURITY
IN THE PAST 12 MONTHS, HAS THE LACK OF TRANSPORTATION KEPT YOU FROM MEDICAL APPOINTMENTS OR FROM GETTING MEDICATIONS?: NO

## 2020-12-18 SDOH — ECONOMIC STABILITY: FOOD INSECURITY: WITHIN THE PAST 12 MONTHS, THE FOOD YOU BOUGHT JUST DIDN'T LAST AND YOU DIDN'T HAVE MONEY TO GET MORE.: NEVER TRUE

## 2020-12-18 SDOH — ECONOMIC STABILITY: FOOD INSECURITY: WITHIN THE PAST 12 MONTHS, YOU WORRIED THAT YOUR FOOD WOULD RUN OUT BEFORE YOU GOT MONEY TO BUY MORE.: NEVER TRUE

## 2020-12-18 SDOH — ECONOMIC STABILITY: INCOME INSECURITY: HOW HARD IS IT FOR YOU TO PAY FOR THE VERY BASICS LIKE FOOD, HOUSING, MEDICAL CARE, AND HEATING?: SOMEWHAT HARD

## 2020-12-18 ASSESSMENT — PATIENT HEALTH QUESTIONNAIRE - PHQ9
2. FEELING DOWN, DEPRESSED OR HOPELESS: 0
SUM OF ALL RESPONSES TO PHQ9 QUESTIONS 1 & 2: 1
SUM OF ALL RESPONSES TO PHQ QUESTIONS 1-9: 1
1. LITTLE INTEREST OR PLEASURE IN DOING THINGS: 1
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
## 2020-12-21 ENCOUNTER — APPOINTMENT (OUTPATIENT)
Dept: MRI IMAGING | Age: 55
End: 2020-12-21
Payer: COMMERCIAL

## 2020-12-21 ENCOUNTER — HOSPITAL ENCOUNTER (EMERGENCY)
Age: 55
Discharge: HOME | End: 2020-12-21
Attending: EMERGENCY MEDICINE
Payer: COMMERCIAL

## 2020-12-21 VITALS
RESPIRATION RATE: 18 BRPM | HEART RATE: 75 BPM | BODY MASS INDEX: 36.8 KG/M2 | HEIGHT: 62 IN | SYSTOLIC BLOOD PRESSURE: 159 MMHG | OXYGEN SATURATION: 94 % | WEIGHT: 200 LBS | TEMPERATURE: 96.9 F | DIASTOLIC BLOOD PRESSURE: 81 MMHG

## 2020-12-21 PROCEDURE — 72148 MRI LUMBAR SPINE W/O DYE: CPT

## 2020-12-21 PROCEDURE — 99999 PR OFFICE/OUTPT VISIT,PROCEDURE ONLY: CPT | Performed by: EMERGENCY MEDICINE

## 2020-12-21 PROCEDURE — 99282 EMERGENCY DEPT VISIT SF MDM: CPT

## 2020-12-21 PROCEDURE — 72146 MRI CHEST SPINE W/O DYE: CPT

## 2020-12-21 RX ORDER — HYDROMORPHONE HYDROCHLORIDE 1 MG/ML
2 INJECTION, SOLUTION INTRAMUSCULAR; INTRAVENOUS; SUBCUTANEOUS ONCE
Status: DISCONTINUED | OUTPATIENT
Start: 2020-12-21 | End: 2020-12-22 | Stop reason: HOSPADM

## 2020-12-21 ASSESSMENT — ENCOUNTER SYMPTOMS
CHEST TIGHTNESS: 0
ABDOMINAL PAIN: 0
VOMITING: 0
SHORTNESS OF BREATH: 0
BACK PAIN: 1

## 2020-12-21 ASSESSMENT — PAIN SCALES - GENERAL: PAINLEVEL_OUTOF10: 6

## 2020-12-22 NOTE — ED PROVIDER NOTES
[facility]  eMERGENCYdEPARTMENT eNCOUnter      Pt Name: Byron Lomeli  MRN: [id number]  [place] 1965  Date of evaluation: 12/21/2020  Anshu Martel MD    Emergency Department care of this patient was assumed at [address] [place] [address] from Dr. Mallory Vickers. We have discussed the case and the plan of care. I have seen and evaluated patient and reviewed ED course. CHIEF COMPLAINT       Chief Complaint   Patient presents with    Back Pain    Cancer    Incontinence     Wears LSO all the time, increased back pain from baseline chronic pain, more leakage of urine from baseline, stool leakage which is new, pending MRI but had one recently that was ok, no fall or trauma hx, still ambulatory    PHYSICAL EXAM    (up to 7 for level 4, 8 or more for level 5)     ED Triage Vitals [12/21/20 1754]   BP Temp Temp Source Pulse Resp SpO2 Height Weight   (!) 159/81 96.9 °F (36.1 °C) Tympanic 75 18 94 % 5' 2\" (1.575 m) 200 lb (90.7 kg)       Physical Exam  Constitutional:       Appearance: Normal appearance. She is not ill-appearing or diaphoretic. Comments: Sitting up in bed wearing LSO brace in NAD   HENT:      Head: Normocephalic and atraumatic. Neurological:      Mental Status: She is alert. Sensory: No sensory deficit. Motor: No weakness. Comments: Lifts both legs off bed with no issue, no saddle anesthesia         DIAGNOSTIC RESULTS         RADIOLOGY:   Non-plain film imagessuch as CT, Ultrasound and MRI are read by the radiologist. Plain radiographic images are visualized and preliminarily interpreted by the emergency physician with the below findings:      MRI THORACIC SPINE WO CONTRAST   Final Result   1. Previously described mild acute compression injuries of T11 and   T12. Otherwise intact thoracic vertebral bodies. 2. No significant stenosis or cord compression is seen.    Signed by Dr Ron Bojorquez on 12/21/2020 9:17 PM      MRI LUMBAR SPINE WO CONTRAST   Final Result 12/21/20 2159

## 2020-12-22 NOTE — ED PROVIDER NOTES
[facility]  eMERGENCY dEPARTMENT eNCOUnter      Pt Name: Margy Jeff  MRN: [id number]  [place] 1965  Date of evaluation: 12/21/2020  Provider: Ck Meyer MD    [address] [place] [address]       Chief Complaint   Patient presents with    Back Pain    Cancer    Incontinence         HISTORY OF PRESENT ILLNESS   (Location/Symptom, Timing/Onset,Context/Setting, Quality, Duration, Modifying Factors, Severity)  Note limiting factors. Margy Jeff is a 54 y.o. female who presents to the emergency department for evaluation of increasing low back pain over the last several days along with increased feelings of bladder incontinence and leakage of stool over the past 1 to 2 days. Patient reports that she is able to walk and bear weight on her lower extremities they just feels somewhat numb and tingly. States that she has had a longstanding history of issues with her low back. Multiple previous compression fractures involving her thoracic and lumbar spine. She is underwent multilevel kyphoplasty performed in her lumbar spine previously. States that she has not had any specific fall or trauma. 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 She underwent radiation and is currently followed at [facility]. Patient tells me she wears her LSO brace except when she is sleeping. HPI    NursingNotes were reviewed. REVIEW OF SYSTEMS    (2-9 systems for level 4, 10 or more for level 5)     Review of Systems   Constitutional: Negative for chills and fever. Respiratory: Negative for chest tightness and shortness of breath. Cardiovascular: Negative for chest pain. Gastrointestinal: Negative for abdominal pain and vomiting. Musculoskeletal: Positive for back pain. Neurological: Positive for numbness. All other systems reviewed and are negative.            PAST MEDICALHISTORY     Past Medical History: Family History   Problem Relation Age of Onset    Diabetes Sister     Diabetes Brother     High Blood Pressure Mother     Heart Disease Mother     Cancer Father         lung    Diabetes Maternal Grandmother     Colon Cancer Paternal Grandmother     Cancer Paternal Grandmother         colon CA          SOCIAL HISTORY       Social History     Socioeconomic History    Marital status:      Spouse name: Not on file    Number of children: Not on file    Years of education: Not on file    Highest education level: Not on file   Occupational History    Not on file   Social Needs    Financial resource strain: Somewhat hard    Food insecurity     Worry: Never true     Inability: Never true    Transportation needs     Medical: No     Non-medical: No   Tobacco Use    Smoking status: Former Smoker     Packs/day: 1.00     Years: 30.00     Pack years: 30.00     Types: Cigarettes     Quit date: 10/2020     Years since quittin.2    Smokeless tobacco: Current User     Types: Snuff    Tobacco comment: Pt encouraged to quit.    Substance and Sexual Activity    Alcohol use: Yes     Comment: socially    Drug use: No    Sexual activity: Not on file   Lifestyle    Physical activity     Days per week: Not on file     Minutes per session: Not on file    Stress: Not on file   Relationships    Social connections     Talks on phone: Not on file     Gets together: Not on file     Attends Rastafari service: Not on file     Active member of club or organization: Not on file     Attends meetings of clubs or organizations: Not on file     Relationship status: Not on file    Intimate partner violence     Fear of current or ex partner: Not on file     Emotionally abused: Not on file     Physically abused: Not on file     Forced sexual activity: Not on file   Other Topics Concern    Not on file   Social History Narrative    Not on file       SCREENINGS             PHYSICAL EXAM (up to 7 for level 4, 8 or more for level 5)     ED Triage Vitals [12/21/20 1754]   BP Temp Temp Source Pulse Resp SpO2 Height Weight   (!) 159/81 96.9 °F (36.1 °C) Tympanic 75 18 94 % 5' 2\" (1.575 m) 200 lb (90.7 kg)       Physical Exam  Vitals signs and nursing note reviewed. HENT:      Head: Atraumatic. Mouth/Throat:      Mouth: Mucous membranes are not dry. Pharynx: No posterior oropharyngeal erythema. Eyes:      General: No scleral icterus. Pupils: Pupils are equal, round, and reactive to light. Neck:      Trachea: No tracheal deviation. Cardiovascular:      Rate and Rhythm: Normal rate and regular rhythm. Heart sounds: Normal heart sounds. No murmur. Pulmonary:      Effort: Pulmonary effort is normal. No respiratory distress. Breath sounds: Normal breath sounds. No stridor. Abdominal:      General: There is no distension. Palpations: Abdomen is soft. Tenderness: There is no abdominal tenderness. There is no guarding. Musculoskeletal:      Comments: LSO brace in place   Skin:     Capillary Refill: Capillary refill takes less than 2 seconds. Coloration: Skin is not pale. Findings: No rash. Neurological:      Mental Status: She is alert and oriented to person, place, and time. Comments: Patient able to plantar flex and dorsiflex bilateral lower extremities. She is able to raise her legs off the bed bilaterally. Psychiatric:         Behavior: Behavior is cooperative.          DIAGNOSTIC RESULTS       RADIOLOGY:  Non-plain film images such as CT, Ultrasound and MRI are read by the radiologist. Plain radiographic images are visualized and preliminarily interpreted bythe emergency physician with the below findings:         [facility]    (Results Pending)   MRI LUMBAR SPINE [facility]    (Results Pending)           LABS:  Labs Reviewed - No data to display

## 2020-12-28 ASSESSMENT — ENCOUNTER SYMPTOMS: TROUBLE SWALLOWING: 0

## 2021-01-11 LAB
CALCIUM SERPL-MCNC: 9.4 MG/DL (ref 8.6–10)
VITAMIN D 25-HYDROXY: 28.7 NG/ML

## 2021-03-18 ASSESSMENT — ENCOUNTER SYMPTOMS
CONSTIPATION: 0
DIARRHEA: 0
ABDOMINAL PAIN: 0
GASTROINTESTINAL NEGATIVE: 1
RESPIRATORY NEGATIVE: 1
COUGH: 0
EYES NEGATIVE: 1
EYE DISCHARGE: 0
SHORTNESS OF BREATH: 0
EYE PAIN: 0
NAUSEA: 0
BLOOD IN STOOL: 0
BACK PAIN: 1
WHEEZING: 0
VOMITING: 0
EYE REDNESS: 0
SORE THROAT: 0
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
## 2021-03-19 ENCOUNTER — HOSPITAL ENCOUNTER (OUTPATIENT)
Dept: INFUSION THERAPY | Age: 56
Discharge: HOME | End: 2021-03-19
Payer: MEDICARE

## 2021-03-19 ENCOUNTER — OFFICE VISIT (OUTPATIENT)
Dept: HEMATOLOGY | Age: 56
End: 2021-03-19
Payer: MEDICARE

## 2021-03-19 VITALS
HEIGHT: 62 IN | DIASTOLIC BLOOD PRESSURE: 84 MMHG | WEIGHT: 193.2 LBS | BODY MASS INDEX: 35.55 KG/M2 | TEMPERATURE: 97.1 F | OXYGEN SATURATION: 93 % | SYSTOLIC BLOOD PRESSURE: 134 MMHG | HEART RATE: 85 BPM

## 2021-03-19 DIAGNOSIS — G89.29 CHRONIC BILATERAL LOW BACK PAIN WITH BILATERAL SCIATICA: ICD-10-CM

## 2021-03-19 DIAGNOSIS — M54.42 CHRONIC BILATERAL LOW BACK PAIN WITH BILATERAL SCIATICA: ICD-10-CM

## 2021-03-19 DIAGNOSIS — C90.30 SOLITARY PLASMACYTOMA OF BONE (HCC): Primary | ICD-10-CM

## 2021-03-19 DIAGNOSIS — C90.30 SOLITARY PLASMACYTOMA OF BONE (HCC): ICD-10-CM

## 2021-03-19 DIAGNOSIS — Z72.0 TOBACCO ABUSE: ICD-10-CM

## 2021-03-19 DIAGNOSIS — M54.41 CHRONIC BILATERAL LOW BACK PAIN WITH BILATERAL SCIATICA: ICD-10-CM

## 2021-03-19 LAB
BASOPHILS ABSOLUTE: 0.04 K/UL (ref 0.01–0.08)
BASOPHILS RELATIVE PERCENT: 0.7 % (ref 0.1–1.2)
EOSINOPHILS ABSOLUTE: 0.31 K/UL (ref 0.04–0.54)
EOSINOPHILS RELATIVE PERCENT: 5.3 % (ref 0.7–7)
HCT VFR BLD CALC: 45.5 % (ref 34.1–44.9)
HEMOGLOBIN: 15 G/DL (ref 11.2–15.7)
LYMPHOCYTES ABSOLUTE: 1.21 K/UL (ref 1.18–3.74)
LYMPHOCYTES RELATIVE PERCENT: 20.8 % (ref 19.3–53.1)
MCH RBC QN AUTO: 30.9 PG (ref 25.6–32.2)
MCHC RBC AUTO-ENTMCNC: 33 G/DL (ref 32.3–35.5)
MCV RBC AUTO: 93.8 FL (ref 79.4–94.8)
MONOCYTES ABSOLUTE: 0.38 K/UL (ref 0.24–0.82)
MONOCYTES RELATIVE PERCENT: 6.5 % (ref 4.7–12.5)
NEUTROPHILS ABSOLUTE: 3.88 K/UL (ref 1.56–6.13)
NEUTROPHILS RELATIVE PERCENT: 66.7 % (ref 34–71.1)
PDW BLD-RTO: 13.2 % (ref 11.7–14.4)
PLATELET # BLD: 165 K/UL (ref 182–369)
PMV BLD AUTO: 8.7 FL (ref 7.4–10.4)
RBC # BLD: 4.85 M/UL (ref 3.93–5.22)
WBC # BLD: 5.82 K/UL (ref 3.98–10.04)

## 2021-03-19 PROCEDURE — 99213 OFFICE O/P EST LOW 20 MIN: CPT | Performed by: NURSE PRACTITIONER

## 2021-03-19 PROCEDURE — G8417 CALC BMI ABV UP PARAM F/U: HCPCS | Performed by: NURSE PRACTITIONER

## 2021-03-19 PROCEDURE — G8427 DOCREV CUR MEDS BY ELIG CLIN: HCPCS | Performed by: NURSE PRACTITIONER

## 2021-03-19 PROCEDURE — G8482 FLU IMMUNIZE ORDER/ADMIN: HCPCS | Performed by: NURSE PRACTITIONER

## 2021-03-19 PROCEDURE — 3017F COLORECTAL CA SCREEN DOC REV: CPT | Performed by: NURSE PRACTITIONER

## 2021-03-19 PROCEDURE — 85025 COMPLETE CBC W/AUTO DIFF WBC: CPT

## 2021-03-19 PROCEDURE — 99212 OFFICE O/P EST SF 10 MIN: CPT

## 2021-03-19 PROCEDURE — 4004F PT TOBACCO SCREEN RCVD TLK: CPT | Performed by: NURSE PRACTITIONER

## 2021-03-23 LAB
CALCIUM URINE: 4.7 MG/DL (ref 6.8–21.3)
CREATININE URINE: 334.6 MG/DL (ref 4.2–622)
PARATHYROID HORMONE INTACT: 109.8 PG/ML (ref 15–65)
PHOSPHORUS: 2.5 MG/DL (ref 2.5–4.5)
TSH SERPL DL<=0.05 MIU/L-ACNC: 1.25 UIU/ML (ref 0.27–4.2)
VITAMIN D 25-HYDROXY: 57 NG/ML

## 2021-04-09 PROBLEM — M81.0 OSTEOPOROSIS: Status: ACTIVE | Noted: 2021-04-09

## 2021-04-09 LAB
CALCIUM SERPL-MCNC: 9 MG/DL (ref 8.6–10)
VITAMIN D 25-HYDROXY: 43.2 NG/ML

## 2021-04-15 ENCOUNTER — INFUSION (OUTPATIENT)
Dept: ONCOLOGY | Facility: HOSPITAL | Age: 56
End: 2021-04-15

## 2021-04-15 VITALS
SYSTOLIC BLOOD PRESSURE: 151 MMHG | WEIGHT: 200 LBS | OXYGEN SATURATION: 100 % | RESPIRATION RATE: 16 BRPM | DIASTOLIC BLOOD PRESSURE: 66 MMHG | HEART RATE: 57 BPM | HEIGHT: 62 IN | TEMPERATURE: 97.5 F | BODY MASS INDEX: 36.8 KG/M2

## 2021-04-15 DIAGNOSIS — M81.0 OSTEOPOROSIS, UNSPECIFIED OSTEOPOROSIS TYPE, UNSPECIFIED PATHOLOGICAL FRACTURE PRESENCE: Primary | ICD-10-CM

## 2021-04-15 PROCEDURE — 25010000002 ROMOSOZUMAB-AQQG 105 MG/1.17ML SOLUTION PREFILLED SYRINGE: Performed by: PHYSICIAN ASSISTANT

## 2021-04-15 PROCEDURE — 96372 THER/PROPH/DIAG INJ SC/IM: CPT

## 2021-04-15 RX ADMIN — ROMOSOZUMAB-AQQG 210 MG: 105 INJECTION, SOLUTION SUBCUTANEOUS at 13:57

## 2021-04-21 ENCOUNTER — TRANSCRIBE ORDERS (OUTPATIENT)
Dept: ADMINISTRATIVE | Facility: HOSPITAL | Age: 56
End: 2021-04-21

## 2021-04-21 DIAGNOSIS — M81.0 AGE-RELATED OSTEOPOROSIS WITHOUT CURRENT PATHOLOGICAL FRACTURE: Primary | ICD-10-CM

## 2021-06-02 DIAGNOSIS — E78.5 HYPERLIPIDEMIA, UNSPECIFIED HYPERLIPIDEMIA TYPE: ICD-10-CM

## 2021-06-03 RX ORDER — SIMVASTATIN 20 MG
TABLET ORAL
Qty: 90 TABLET | Refills: 0 | OUTPATIENT
Start: 2021-06-03

## 2021-06-12 DIAGNOSIS — E78.5 HYPERLIPIDEMIA, UNSPECIFIED HYPERLIPIDEMIA TYPE: ICD-10-CM

## 2021-06-14 RX ORDER — SIMVASTATIN 20 MG
TABLET ORAL
Qty: 30 TABLET | Refills: 0 | Status: SHIPPED | OUTPATIENT
Start: 2021-06-14 | End: 2021-07-08

## 2021-07-05 DIAGNOSIS — E78.5 HYPERLIPIDEMIA, UNSPECIFIED HYPERLIPIDEMIA TYPE: ICD-10-CM

## 2021-07-06 ENCOUNTER — HOSPITAL ENCOUNTER (OUTPATIENT)
Dept: NUCLEAR MEDICINE | Age: 56
Discharge: HOME | End: 2021-07-08
Payer: COMMERCIAL

## 2021-07-06 DIAGNOSIS — C90.30 SOLITARY PLASMACYTOMA OF BONE (HCC): ICD-10-CM

## 2021-07-06 LAB
GLUCOSE BLD-MCNC: 102 MG/DL (ref 70–99)
PERFORMED ON: ABNORMAL

## 2021-07-06 PROCEDURE — 82947 ASSAY GLUCOSE BLOOD QUANT: CPT

## 2021-07-06 PROCEDURE — A9552 F18 FDG: HCPCS | Performed by: NURSE PRACTITIONER

## 2021-07-06 PROCEDURE — 3430000000 HC RX DIAGNOSTIC RADIOPHARMACEUTICAL: Performed by: NURSE PRACTITIONER

## 2021-07-06 PROCEDURE — 78815 PET IMAGE W/CT SKULL-THIGH: CPT

## 2021-07-06 RX ORDER — FLUDEOXYGLUCOSE F 18 200 MCI/ML
10 INJECTION, SOLUTION INTRAVENOUS
Status: COMPLETED | OUTPATIENT
Start: 2021-07-06 | End: 2021-07-06

## 2021-07-06 RX ADMIN — FLUDEOXYGLUCOSE F 18 10 MILLICURIE: 200 INJECTION, SOLUTION INTRAVENOUS at 13:33

## 2021-07-08 RX ORDER — SIMVASTATIN 20 MG
TABLET ORAL
Qty: 30 TABLET | Refills: 0 | Status: SHIPPED | OUTPATIENT
Start: 2021-07-08 | End: 2021-08-16

## 2021-07-08 NOTE — TELEPHONE ENCOUNTER
Patient needs to schedule an apt to be seen by PCP, Dr. Lauren Zambrano for follow up and additional refills.

## 2021-07-14 ENCOUNTER — OFFICE VISIT (OUTPATIENT)
Dept: FAMILY MEDICINE CLINIC | Age: 56
End: 2021-07-14
Payer: COMMERCIAL

## 2021-07-14 VITALS
WEIGHT: 184.6 LBS | DIASTOLIC BLOOD PRESSURE: 74 MMHG | BODY MASS INDEX: 36.24 KG/M2 | SYSTOLIC BLOOD PRESSURE: 138 MMHG | OXYGEN SATURATION: 95 % | TEMPERATURE: 97.4 F | HEIGHT: 60 IN | HEART RATE: 71 BPM

## 2021-07-14 DIAGNOSIS — Z11.59 NEED FOR HEPATITIS C SCREENING TEST: ICD-10-CM

## 2021-07-14 DIAGNOSIS — Z12.31 SCREENING MAMMOGRAM, ENCOUNTER FOR: ICD-10-CM

## 2021-07-14 DIAGNOSIS — Z00.00 ROUTINE GENERAL MEDICAL EXAMINATION AT A HEALTH CARE FACILITY: ICD-10-CM

## 2021-07-14 DIAGNOSIS — E11.9 TYPE 2 DIABETES MELLITUS WITHOUT COMPLICATION, WITHOUT LONG-TERM CURRENT USE OF INSULIN (HCC): Primary | ICD-10-CM

## 2021-07-14 DIAGNOSIS — Z11.4 ENCOUNTER FOR SCREENING FOR HIV: ICD-10-CM

## 2021-07-14 DIAGNOSIS — E11.9 TYPE 2 DIABETES MELLITUS WITHOUT COMPLICATION, WITHOUT LONG-TERM CURRENT USE OF INSULIN (HCC): ICD-10-CM

## 2021-07-14 DIAGNOSIS — Z23 NEED FOR 23-POLYVALENT PNEUMOCOCCAL POLYSACCHARIDE VACCINE: ICD-10-CM

## 2021-07-14 LAB
ALBUMIN SERPL-MCNC: 4.4 G/DL (ref 3.5–5.2)
ALP BLD-CCNC: 84 U/L (ref 35–104)
ALT SERPL-CCNC: 8 U/L (ref 5–33)
ANION GAP SERPL CALCULATED.3IONS-SCNC: 8 MMOL/L (ref 7–19)
AST SERPL-CCNC: 11 U/L (ref 5–32)
BACTERIA: NEGATIVE /HPF
BILIRUB SERPL-MCNC: 0.6 MG/DL (ref 0.2–1.2)
BILIRUBIN URINE: ABNORMAL
BLOOD, URINE: NEGATIVE
BUN BLDV-MCNC: 20 MG/DL (ref 6–20)
CALCIUM SERPL-MCNC: 9.8 MG/DL (ref 8.6–10)
CHLORIDE BLD-SCNC: 101 MMOL/L (ref 98–111)
CHOLESTEROL, TOTAL: 129 MG/DL (ref 160–199)
CLARITY: ABNORMAL
CO2: 30 MMOL/L (ref 22–29)
COLOR: ABNORMAL
CREAT SERPL-MCNC: 0.6 MG/DL (ref 0.5–0.9)
CRYSTALS, UA: ABNORMAL /HPF
EPITHELIAL CELLS, UA: 5 /HPF (ref 0–5)
GFR AFRICAN AMERICAN: >59
GFR NON-AFRICAN AMERICAN: >60
GLUCOSE BLD-MCNC: 93 MG/DL (ref 74–109)
GLUCOSE URINE: NEGATIVE MG/DL
HBA1C MFR BLD: 5.6 % (ref 4–6)
HCT VFR BLD CALC: 49 % (ref 37–47)
HDLC SERPL-MCNC: 44 MG/DL (ref 65–121)
HEMOGLOBIN: 15.8 G/DL (ref 12–16)
HEPATITIS C ANTIBODY INTERPRETATION: NORMAL
HIV-1 P24 AG: NORMAL
HYALINE CASTS: 5 /HPF (ref 0–8)
KETONES, URINE: ABNORMAL MG/DL
LDL CHOLESTEROL CALCULATED: 46 MG/DL
LEUKOCYTE ESTERASE, URINE: ABNORMAL
MCH RBC QN AUTO: 30.8 PG (ref 27–31)
MCHC RBC AUTO-ENTMCNC: 32.2 G/DL (ref 33–37)
MCV RBC AUTO: 95.5 FL (ref 81–99)
MICROALBUMIN UR-MCNC: 5.7 MG/DL (ref 0–19)
NITRITE, URINE: NEGATIVE
PDW BLD-RTO: 13.9 % (ref 11.5–14.5)
PH UA: 5.5 (ref 5–8)
PLATELET # BLD: 196 K/UL (ref 130–400)
PMV BLD AUTO: 9.8 FL (ref 9.4–12.3)
POTASSIUM SERPL-SCNC: 4.2 MMOL/L (ref 3.5–5)
PROTEIN UA: 30 MG/DL
RAPID HIV 1&2: NORMAL
RBC # BLD: 5.13 M/UL (ref 4.2–5.4)
RBC UA: 5 /HPF (ref 0–4)
SODIUM BLD-SCNC: 139 MMOL/L (ref 136–145)
SPECIFIC GRAVITY UA: 1.03 (ref 1–1.03)
TOTAL PROTEIN: 7.4 G/DL (ref 6.6–8.7)
TRIGL SERPL-MCNC: 196 MG/DL (ref 0–149)
UROBILINOGEN, URINE: 1 E.U./DL
WBC # BLD: 6.6 K/UL (ref 4.8–10.8)
WBC UA: 2 /HPF (ref 0–5)

## 2021-07-14 PROCEDURE — 3046F HEMOGLOBIN A1C LEVEL >9.0%: CPT | Performed by: FAMILY MEDICINE

## 2021-07-14 PROCEDURE — G0009 ADMIN PNEUMOCOCCAL VACCINE: HCPCS | Performed by: FAMILY MEDICINE

## 2021-07-14 PROCEDURE — G0438 PPPS, INITIAL VISIT: HCPCS | Performed by: FAMILY MEDICINE

## 2021-07-14 PROCEDURE — 90732 PPSV23 VACC 2 YRS+ SUBQ/IM: CPT | Performed by: FAMILY MEDICINE

## 2021-07-14 PROCEDURE — 3017F COLORECTAL CA SCREEN DOC REV: CPT | Performed by: FAMILY MEDICINE

## 2021-07-14 ASSESSMENT — LIFESTYLE VARIABLES
HOW OFTEN DO YOU HAVE A DRINK CONTAINING ALCOHOL: 0
HOW OFTEN DO YOU HAVE A DRINK CONTAINING ALCOHOL: NEVER
AUDIT-C TOTAL SCORE: INCOMPLETE
AUDIT TOTAL SCORE: INCOMPLETE

## 2021-07-14 ASSESSMENT — PATIENT HEALTH QUESTIONNAIRE - PHQ9
2. FEELING DOWN, DEPRESSED OR HOPELESS: 1
1. LITTLE INTEREST OR PLEASURE IN DOING THINGS: 1
SUM OF ALL RESPONSES TO PHQ QUESTIONS 1-9: 2
SUM OF ALL RESPONSES TO PHQ9 QUESTIONS 1 & 2: 2
SUM OF ALL RESPONSES TO PHQ QUESTIONS 1-9: 2
SUM OF ALL RESPONSES TO PHQ QUESTIONS 1-9: 2

## 2021-07-14 NOTE — PATIENT INSTRUCTIONS
Stopping Smoking: Care Instructions  Your Care Instructions     Cigarette smokers crave the nicotine in cigarettes. Giving it up is much harder than simply changing a habit. Your body has to stop craving the nicotine. It is hard to quit, but you can do it. There are many tools that people use to quit smoking. You may find that combining tools works best for you. There are several steps to quitting. First you get ready to quit. Then you get support to help you. After that, you learn new skills and behaviors to become a nonsmoker. For many people, a necessary step is getting and using medicine. Your doctor will help you set up the plan that best meets your needs. You may want to attend a smoking cessation program to help you quit smoking. When you choose a program, look for one that has proven success. Ask your doctor for ideas. You will greatly increase your chances of success if you take medicine as well as get counseling or join a cessation program.  Some of the changes you feel when you first quit tobacco are uncomfortable. Your body will miss the nicotine at first, and you may feel short-tempered and grumpy. You may have trouble sleeping or concentrating. Medicine can help you deal with these symptoms. You may struggle with changing your smoking habits and rituals. The last step is the tricky one: Be prepared for the smoking urge to continue for a time. This is a lot to deal with, but keep at it. You will feel better. Follow-up care is a key part of your treatment and safety. Be sure to make and go to all appointments, and call your doctor if you are having problems. It's also a good idea to know your test results and keep a list of the medicines you take. How can you care for yourself at home? · Ask your family, friends, and coworkers for support. You have a better chance of quitting if you have help and support.   · Join a support group, such as Nicotine Anonymous, for people who are trying to quit smoking. · Consider signing up for a smoking cessation program, such as the American Lung Association's Freedom from Smoking program.  · Get text messaging support. Go to the website at www.smokefree. gov to sign up for the [facility] program.  · Set a quit date. Pick your date carefully so that it is not right in the middle of a big deadline or stressful time. Once you quit, do not even take a puff. Get rid of all ashtrays and lighters after your last cigarette. Clean your house and your clothes so that they do not smell of smoke. · Learn how to be a nonsmoker. Think about ways you can avoid those things that make you reach for a cigarette. ? Avoid situations that put you at greatest risk for smoking. For some people, it is hard to have a drink with friends without smoking. For others, they might skip a coffee break with coworkers who smoke. ? Change your daily routine. Take a different route to work or eat a meal in a different place. · Cut down on stress. Calm yourself or release tension by doing an activity you enjoy, such as reading a book, taking a hot bath, or gardening. · Talk to your doctor or pharmacist about nicotine replacement therapy, which replaces the nicotine in your body. You still get nicotine but you do not use tobacco. Nicotine replacement products help you slowly reduce the amount of nicotine you need. These products come in several forms, many of them available over-the-counter:  ? Nicotine patches  ? Nicotine gum and lozenges  ? Nicotine inhaler  · Ask your doctor about bupropion (Wellbutrin) or varenicline (Chantix), which are prescription medicines. They do not contain nicotine. They help you by reducing withdrawal symptoms, such as stress and anxiety. · Some people find hypnosis, acupuncture, and massage helpful for ending the smoking habit. · Eat a healthy diet and get regular exercise. Having healthy habits will help your body move past its craving for nicotine.   · Be prepared to keep trying. Most people are not successful the first few times they try to quit. Do not get mad at yourself if you smoke again. Make a list of things you learned and think about when you want to try again, such as next week, next month, or next year. Where can you learn more? Go to https://chpepicewlos.iBio. org and sign in to your [employer] account. Enter Q143 in the [facility] box to learn more about \"Stopping Smoking: Care Instructions. \"     If you do not have an account, please click on the \"Sign Up Now\" link. Current as of: February 11, 2021               Content Version: 12.9  © 2006-2021 Healthwise, [employer]. Care instructions adapted under license by [facility] [employer] [employer] [facility] If you have questions about a medical condition or this instruction, always ask your healthcare professional. [facility] [address] any warranty or liability for your use of this information. Personalized Preventive Plan for Nawaf Grain - 7/14/2021  Medicare offers a range of preventive health benefits. Some of the tests and screenings are paid in full while other may be subject to a deductible, co-insurance, and/or copay. Some of these benefits include a comprehensive review of your medical history including lifestyle, illnesses that may run in your family, and various assessments and screenings as appropriate. After reviewing your medical record and screening and assessments performed today your provider may have ordered immunizations, labs, imaging, and/or referrals for you. A list of these orders (if applicable) as well as your Preventive Care list are included within your After Visit Summary for your review. Other Preventive Recommendations:    · A preventive eye exam performed by an eye specialist is recommended every 1-2 years to screen for glaucoma; cataracts, macular degeneration, and other eye disorders. · A preventive dental visit is recommended every 6 months.   · Try to get at least 150 minutes of exercise per week or 10,000 steps per day on a pedometer . · Order or download the FREE \"Exercise & Physical Activity: Your Everyday Guide\" from [employer]. Call 6-529.999.2248 or search [employer] online. · You need 8475-1932 mg of calcium and 1107-4219 IU of vitamin D per day. It is possible to meet your calcium requirement with diet alone, but a vitamin D supplement is usually necessary to meet this goal.  · When exposed to the sun, use a sunscreen that protects against both UVA and UVB radiation with an SPF of 30 or greater. Reapply every 2 to 3 hours or after sweating, drying off with a towel, or swimming. · Always wear a seat belt when traveling in a car. Always wear a helmet when riding a bicycle or motorcycle. Keep Your Memory Gayl Him       Many factors can affect your ability to remembera hectic lifestyle, aging, stress, chronic disease, and certain medicines. But, there are steps you can take to sharpen your mind and help preserve your memory. Challenge Your Brain   Regularly challenging your mind may help keeps it in top shape. Good mental exercises include:   Crossword puzzlesUse a dictionary if you need it; you will learn more that way. Brainteasers Try some! Crafts, such as wood working and sewing   Hobbies, such as gardening and building model airplanes   SocializingVisit old friends or join groups to meet new ones. Reading   Learning a new language   Taking a class, whether it be art history or lucie chi   TravelingExperience the food, history, and culture of your destination   Learning to use a computer   Going to museums, the theater, or thought-provoking movies   Changing things in your daily life, such as reversing your pattern in the grocery store or brushing your teeth using your nondominant hand   Use Memory Aids   There is no need to remember every detail on your own.  These memory aids can help:   Calendars and day planners   Electronic organizers to store all sorts of helpful informationThese devices can \"beep\" to remind you of appointments. A book of days to record birthdays, anniversaries, and other occasions that occur on the same date every year   Detailed \"to-do\" lists and strategically placed sticky notes   Quick \"study\" sessionsBefore a gathering, review who will be there so their names will be fresh in your mind. Establish routinesFor example, keep your keys, wallet, and umbrella in the same place all the time or take medicine with your 8:00 AM glass of juice   Live a Healthy Life   Many actions that will keep your body strong will do the same for your mind. For example:   Talk to Your Doctor About Herbs and Supplements    Malnutrition and vitamin deficiencies can impair your mental function. For example, vitamin B12 deficiency can cause a range of symptoms, including confusion. But, what if your nutritional needs are being met? Can herbs and supplements still offer a benefit? Researchers have investigated a range of natural remedies, such as ginkgo , ginseng , and the supplement phosphatidylserine (PS). So far, though, the evidence is inconsistent as to whether these products can improve memory or thinking. If you are interested in taking herbs and supplements, talk to your doctor first because they may interact with other medicines that you are taking. Exercise Regularly    Among the many benefits of regular exercise are increased blood flow to the brain and decreased risk of certain diseases that can interfere with memory function. One study found that even moderate exercise has a beneficial effect. Examples of \"moderate\" exercise include:   Playing 18 holes of golf once a week, without a cart   Playing tennis twice a week   Walking one mile per day   Manage Stress    It can be tough to remember what is important when your mind is cluttered. Make time for relaxation.  Choose activities that calm you down, and make it routine. Manage Chronic Conditions    Side effects of high blood pressure , diabetes, and heart disease can interfere with mental function. Many of the lifestyle steps discussed here can help manage these conditions. Strive to eat a healthy diet, exercise regularly, get stress under control, and follow your doctor's advice for your condition. Minimize Medications    Talk to your doctor about the medicines that you take. Some may be unnecessary. Also, healthy lifestyle habits may lower the need for certain drugs. Last Reviewed: April 2010 Ramón Nick MD   Updated: 4/13/2010   ·   Smoking Cessation  This document explains the best ways for you to quit smoking and new treatments to help. It lists new medicines that can double or triple your chances of quitting and quitting for good. It also considers ways to avoid relapses and concerns you may have about quitting, including weight gain. NICOTINE: A POWERFUL ADDICTION  If you have tried to quit smoking, you know how hard it can be. It is hard because nicotine is a very addictive drug. For some people, it can be as addictive as heroin or cocaine. Usually, people make 2 or 3 tries, or more, before finally being able to quit. Each time you try to quit, you can learn about what helps and what hurts. Quitting takes hard work and a lot of effort, but you can quit smoking. QUITTING SMOKING IS ONE OF THE MOST IMPORTANT THINGS YOU WILL EVER DO. You will live longer, feel better, and live better. The impact on your body of quitting smoking is felt almost immediately:  Within 20 minutes, blood pressure decreases. Pulse returns to its normal level. After 8 hours, carbon monoxide levels in the blood return to normal. Oxygen level increases. After 24 hours, chance of heart attack starts to decrease. Breath, hair, and body stop smelling like smoke. After 48 hours, damaged nerve endings begin to recover. Sense of taste and smell improve.   After 72 hours, the body is virtually free of nicotine. Bronchial tubes relax and breathing becomes easier. After 2 to 12 weeks, lungs can hold more air. Exercise becomes easier and circulation improves. Quitting will reduce your risk of having a heart attack, stroke, cancer, or lung disease:  After 1 year, the risk of coronary heart disease is cut in half. After 5 years, the risk of stroke falls to the same as a nonsmoker. After 10 years, the risk of lung cancer is cut in half and the risk of other cancers decreases significantly. After 15 years, the risk of coronary heart disease drops, usually to the level of a nonsmoker. If you are pregnant, quitting smoking will improve your chances of having a healthy baby. The people you live with, especially your children, will be healthier. You will have extra money to spend on things other than cigarettes. FIVE KEYS TO QUITTING  Studies have shown that these 5 steps will help you quit smoking and quit for good. You have the best chances of quitting if you use them together:  Get ready. Get support and encouragement. Learn new skills and behaviors. Get medicine to reduce your nicotine addiction and use it correctly. Be prepared for relapse or difficult situations. Be determined to continue trying to quit, even if you do not succeed at first.  1. GET READY  Set a quit date. Change your environment. Get rid of ALL cigarettes, ashtrays, matches, and lighters in your home, car, and place of work. Do not let people smoke in your home. Review your past attempts to quit. Think about what worked and what did not. Once you quit, do not smoke. NOT EVEN A PUFF! 2. GET SUPPORT AND ENCOURAGEMENT  Studies have shown that you have a better chance of being successful if you have help. You can get support in many ways. Tell your family, friends, and coworkers that you are going to quit and need their support. Ask them not to smoke around you.   Talk to your caregivers (doctor, dentist, nurse, pharmacist, psychologist, and/or smoking counselor). Get individual, group, or telephone counseling and support. The more counseling you have, the better your chances are of quitting. Programs are available at [facility]. Call your local health department for information about programs in your area. Spiritual beliefs and practices may help some smokers quit. Quit meters are small computer programs online or downloadable that keep track of quit statistics, such as amount of \"quit-time,\" cigarettes not smoked, and money saved. Many smokers find one or more of the many self-help books available useful in helping them quit and stay off tobacco.  3. LEARN NEW SKILLS AND BEHAVIORS  Try to distract yourself from urges to smoke. Talk to someone, go for a walk, or occupy your time with a task. When you first try to quit, change your routine. Take a different route to work. Drink tea instead of coffee. Eat breakfast in a different place. Do something to reduce your stress. Take a hot bath, exercise, or read a book. Plan something enjoyable to do every day. Reward yourself for not smoking. Explore interactive web-based programs that specialize in helping you quit. 4. GET MEDICINE AND USE IT CORRECTLY  Medicines can help you stop smoking and decrease the urge to smoke. Combining medicine with the above behavioral methods and support can quadruple your chances of successfully quitting smoking. The [place] Food and Drug Administration (FDA) has approved 7 medicines to help you quit smoking. These medicines fall into 3 categories. Nicotine replacement therapy (delivers nicotine to your body without the negative effects and risks of smoking):  Nicotine gum: Available over-the-counter. Nicotine lozenges: Available over-the-counter. Nicotine inhaler: Available by prescription. Nicotine nasal spray: Available by prescription. Nicotine skin patches (transdermal):  Available by prescription and over-the-counter. Antidepressant medicine (helps people abstain from smoking, but how this works is unknown): Bupropion sustained-release (SR) tablets: Available by prescription. Nicotinic receptor partial agonist (simulates the effect of nicotine in your brain):  Varenicline tartrate tablets: Available by prescription. Ask your caregiver for advice about which medicines to use and how to use them. Carefully read the information on the package. Everyone who is trying to quit may benefit from using a medicine. If you are pregnant or trying to become pregnant, nursing an infant, you are under age 25, or you smoke fewer than 10 cigarettes per day, talk to your caregiver before taking any nicotine replacement medicines. You should stop using a nicotine replacement product and call your caregiver if you experience nausea, dizziness, weakness, vomiting, fast or irregular heartbeat, mouth problems with the lozenge or gum, or redness or swelling of the skin around the patch that does not go away. Do not use any other product containing nicotine while using a nicotine replacement product. Talk to your caregiver before using these products if you have diabetes, heart disease, asthma, stomach ulcers, you had a recent heart attack, you have high blood pressure that is not controlled with medicine, a history of irregular heartbeat, or you have been prescribed medicine to help you quit smoking. 5. BE PREPARED FOR RELAPSE OR DIFFICULT SITUATIONS  Most relapses occur within the first 3 months after quitting. Do not be discouraged if you start smoking again. Remember, most people try several times before they finally quit. You may have symptoms of withdrawal because your body is used to nicotine. You may crave cigarettes, be irritable, feel very hungry, cough often, get headaches, or have difficulty concentrating. The withdrawal symptoms are only temporary.  They are strongest when you first quit, but they will go away within 10 to 14 days. Here are some difficult situations to watch for:  Alcohol. Avoid drinking alcohol. Drinking lowers your chances of successfully quitting. Caffeine. Try to reduce the amount of caffeine you consume. It also lowers your chances of successfully quitting. Other smokers. Being around smoking can make you want to smoke. Avoid smokers. Weight gain. Many smokers will gain weight when they quit, usually less than 10 pounds. Eat a healthy diet and stay active. Do not let weight gain distract you from your main goal, quitting smoking. Some medicines that help you quit smoking may also help delay weight gain. You can always lose the weight gained after you quit. Bad mood or depression. There are a lot of ways to improve your mood other than smoking. If you are having problems with any of these situations, talk to your caregiver. SPECIAL SITUATIONS AND CONDITIONS  Studies suggest that everyone can quit smoking. Your situation or condition can give you a special reason to quit. Pregnant women/new mothers: By quitting, you protect your baby's health and your own. Hospitalized patients: By quitting, you reduce health problems and help healing. Heart attack patients: By quitting, you reduce your risk of a second heart attack. Lung, head, and neck cancer patients: By quitting, you reduce your chance of a second cancer. Parents of children and adolescents: By quitting, you protect your children from illnesses caused by secondhand smoke. QUESTIONS TO THINK ABOUT  Think about the following questions before you try to stop smoking. You may want to talk about your answers with your caregiver. Why do you want to quit? If you tried to quit in the past, what helped and what did not? What will be the most difficult situations for you after you quit? How will you plan to handle them? Who can help you through the tough times? Your family? Friends? Caregiver?   What pleasures do you get from smoking? What ways can you still get pleasure if you quit? Here are some questions to ask your caregiver: How can you help me to be successful at quitting? What medicine do you think would be best for me and how should I take it? What should I do if I need more help? What is smoking withdrawal like? How can I get information on withdrawal?  Quitting takes hard work and a lot of effort, but you can quit smoking. FOR MORE INFORMATION   Smokefree. gov ([employer]able[employer]) provides free, accurate, evidence-based information and professional assistance to help support the immediate and long-term needs of people trying to quit smoking. Document Released: 12/12/2002 Document Revised: 12/06/2012 Document Reviewed: 10/04/2010  [facility] Patient Information ©2012 Mabel Butts. ·     Keeping Home a [facility]       As we get older, changes in balance, gait, strength, vision, hearing, and cognition make even the most youthful senior more prone to accidents. Falls are one of the leading health risks for older people. This increased risk of falling is related to:   Aging process (eg, decreased muscle strength, slowed reflexes)   Higher incidence of chronic health problems (eg, arthritis, diabetes) that may limit mobility, agility or sensory awareness   Side effects of medicine (eg, dizziness, blurred vision)especially medicines like prescription pain medicines and drugs used to treat mental health conditions   Depending on the brittleness of your bones, the consequences of a fall can be serious and long lasting. Home Life   Research by the Association of Aging [facility]) shows that some home accidents among older adults can be prevented by making simple lifestyle changes and basic modifications and repairs to the home environment. Here are some lifestyle changes that experts recommend:   Have your hearing and vision checked regularly. Be sure to wear prescription glasses that are right for you.    Speak to your doctor or pharmacist about the possible side effects of your medicines. A number of medicines can cause dizziness. If you have problems with sleep, talk to your doctor. Limit your intake of alcohol. If necessary, use a cane or walker to help maintain your balance. Wear supportive, rubber-soled shoes, even at home. If you live in a region that gets wintry weather, you may want to put special cleats on your shoes to prevent you from slipping on the snow and ice. Exercise regularly to help maintain muscle tone, agility, and balance. Always hold the banister when going up or down stairs. Also, use  bars when getting in or out of the bath or shower, or using the toilet. To avoid dizziness, get up slowly from a lying down position. Sit up first, dangling your legs for a minute or two before rising to a standing position. Overall Home Safety Check   According to the Consumer Product Safety Commision's \"Older Consumer Home Safety Checklist,\" it is important to check for potential hazards in each room. And remember, proper lighting is an essential factor in home safety. If you cannot see clearly, you are more likely to fall. Important questions to ask yourself include:   Are lamp, electric, extension, and telephone cords placed out of the flow of traffic and maintained in good condition? Have frayed cords been replaced? Are all small rugs and runners slip resistant? If not, you can secure them to the floor with a special double-sided carpet tape. Are smoke detectors properly locatedone on every floor of your home and one outside of every sleeping area? Are they in good working order? Are batteries replaced at least once a year? Do you have a well-maintained carbon monoxide detector outside every sleeping are in your home? Does your furniture layout leave plenty of space to maneuver between and around chairs, tables, beds, and sofas? Are hallways, stairs and passages between rooms well lit?  Can you reach a lamp without getting out of bed? Are floor surfaces well maintained? Shag rugs, high-pile carpeting, tile floors, and polished wood floors can be particularly slippery. Stairs should always have handrails and be carpeted or fitted with a non-skid tread. Is your telephone easily reachable. Is the cord safely tucked away? Room by Room   According to the Association of Aging, bathrooms and marielle are the two most potentially hazardous rooms in your home. In the Kitchen    Be sure your stove is in proper working order and always make sure burners and the oven are off before you go out or go to sleep. Keep pots on the back burners, turn handles away from the front of the stove, and keep stove clean and free of grease build-up. Kitchen ventilation systems and range exhausts should be working properly. Keep flammable objects such as towels and pot holders away from the cooking area except when in use. Make sure kitchen curtains are tied back. Move cords and appliances away from the sink and hot surfaces. If extension cords are needed, install wiring guides so they do not hang over the sink, range, or working areas. Look for coffee pots, kettles and toaster ovens with automatic shut-offs. Keep a mop handy in the kitchen so you can wipe up spills instantly. You should also have a small fire extinguisher. Arrange your kitchen with frequently used items on lower shelves to avoid the need to stand on a stepstool to reach them. Make sure countertops are well-lit to avoid injuries while cutting and preparing food. In the Bathroom    Use a non-slip mat or decals in the tub and shower, since wet, soapy tile or porcelain surfaces are extremely slippery. Make sure bathroom rugs are non-skid or tape them firmly to the floor. Bathtubs should have at least one, preferably two, grab bars, firmly attached to structural supports in the wall.  (Do not use built-in soap holders or glass shower fireplace and other fuel-burning appliances yearly. Helping Hands   Baby boomers entering the peña years will continue to see the development of new products to help older adults live safely and independently in spite of age-related changes. Making Life More Livable  , by Camelia York, lists over 1,000 products for \"living well in the mature years,\" such as bathing and mobility aids, household security devices, ergonomically designed knives and peelers, and faucet valves and knobs for temperature control. Medical supply stores and organizations are good sources of information about products that improve your quality of life and insure your safety. Last Reviewed: November 2009 Miryam Weaver MD   Updated: 3/7/2011     ·        Advance Care Planning: Care Instructions  Your Care Instructions     It can be hard to live with an illness that cannot be cured. But if your health is getting worse, you may want to make decisions about end-of-life care. Planning for the end of your life does not mean that you are giving up. It is a way to make sure that your wishes are met. Clearly stating your wishes can make it easier for your loved ones. Making plans while you are still able may also ease your mind and make your final days less stressful and more meaningful. Follow-up care is a key part of your treatment and safety. Be sure to make and go to all appointments, and call your doctor if you are having problems. It's also a good idea to know your test results and keep a list of the medicines you take. What can you do to plan for the end of life? You can bring these issues up with your doctor. You do not need to wait until your doctor starts the conversation. You might start with, \"What makes life worth living for me is. Benedetta Ou Benedetta Ou \" And then follow it with, \"I would not be willing to live with . Benedetta Ou Benedetta Ou Benedetta Ou \" When you complete this sentence it helps your doctor understand your wishes. Talk openly and honestly with your doctor.  This is the best way to understand the decisions you will need to make as your health changes. Know that you can always change your mind. Ask your doctor about commonly used life-support measures. These include tube feedings, breathing machines, and fluids given through a vein (IV). Understanding these treatments will help you decide whether you want them. You may choose to have these life-supporting treatments for a limited time. This allows a trial period to see whether they will help you. You may also decide that you want your doctor to take only certain measures to keep you alive. It may help to think about the big picture, like what makes life worth living for you or what your values and goals are. Talk to your doctor about how long you are likely to live. Your doctor may be able to give you an idea of what usually happens with your specific illness. Think about preparing papers that state your wishes. These papers are called advance directives. If you do this early and review them often, there will not be any confusion about what you want. You can change your instructions at any time. Which papers should you prepare? Advance directives are legal papers that tell doctors how you want to be cared for at the end of your life. You do not need a  to write these papers. Ask your doctor or your state health department for information on how to write your advance directives. They may have the forms for each of these types of papers. Make sure your doctor has a copy of these on file, and give a copy to a family member or close friend. Consider a do-not-resuscitate order (DNR). This order asks that no extra treatments be done if your heart stops or you stop breathing. Extra treatments may include cardiopulmonary resuscitation (CPR), electrical shock to restart your heart, or a machine to breathe for you. If you decide to have a DNR order, ask your doctor to explain and write it.  Place the order in your home where everyone can easily see it. Consider a living will. A living will explains your wishes about life support and other treatments at the end of your life if you become unable to speak for yourself. Living andrade tell doctors to use or not use treatments that would keep you alive. You must have one or two witnesses or a notary present when you sign this form. A living will may be called something else in your state. Consider a medical power of . This form allows you to name a person to make decisions about your care if you are not able to. Most people ask a close friend or family member. Talk to this person about the kinds of treatments you want and those that you do not want. Make sure this person understands your wishes. A medical power of  may be called something else in your state. These legal papers are simple to change. Tell your doctor what you want to change, and ask him or her to make a note in your medical file. Give your family updated copies of the papers. Where can you learn more? Go to [email or web address] org and sign in to your Stereomood account. Enter P184 in the [facility] box to learn more about \"Advance Care Planning: Care Instructions. \"     If you do not have an account, please click on the \"Sign Up Now\" link. Current as of: March 17, 2021               Content Version: 12.9  © 5525-8627 Healthwise, Incorporated. Care instructions adapted under license by [facility] [place] ([facility]). If you have questions about a medical condition or this instruction, always ask your healthcare professional. [place] [address] any warranty or liability for your use of this information. ·        Learning About Living Luzmarian Sergey  What is a living will? A living will, also called a declaration, is a legal form. It tells your family and your doctor your wishes when you can't speak for yourself.  It's used by the health professionals who will treat you as you another state, make sure that your living will is legal in the state where you now live. In most cases, doctors will respect your wishes even if you have a form from a different state. You might use a universal form that has been approved by many states. This kind of form can sometimes be filled out and stored online. Your digital copy will then be available wherever you have a connection to the internet. The doctors and nurses who need to treat you can find it right away. Your state may offer an online registry. This is another place where you can store your living will online. It's a good idea to get your living will notarized. This means using a person called a [employer] to watch two people sign, or witness, your living will. What should you know when you create a living will? Here are some questions to ask yourself as you make your living will:  Do you know enough about life support methods that might be used? If not, talk to your doctor so you know what might be done if you can't breathe on your own, your heart stops, or you can't swallow. What things would you still want to be able to do after you receive life-support methods? Would you want to be able to walk? To speak? To eat on your own? To live without the help of machines? Do you want certain Mormonism practices performed if you become very ill? If you have a choice, where do you want to be cared for? In your home? At a hospital or nursing home? If you have a choice at the end of your life, where would you prefer to die? At home? In a hospital or nursing home? Somewhere else? Would you prefer to be buried or cremated? Do you want your organs to be donated after you die? What should you do with your living will? Make sure that your family members and your health care agent have copies of your living will (also called a declaration). Give your doctor a copy of your living will. Ask him or her to keep it as part of your medical record.  If you have more than one doctor, make sure that each one has a copy. Put a copy of your living will where it can be easily found. For example, some people may put a copy on their refrigerator door. If you are using a digital copy, be sure your doctor, family members, and health care agent know how to find and access it. Where can you learn more? Go to https://[email or web address] org and sign in to your TrenDemon account. Enter D404 in the [facility] box to learn more about \"Learning About Living Jennifer Monsalve. \"     If you do not have an account, please click on the \"Sign Up Now\" link. Current as of: March 17, 2021               Content Version: 12.9  © 2006-2021 Healthwise, [employer]. Care instructions adapted under license by [facility] [place] ([facility]). If you have questions about a medical condition or this instruction, always ask your healthcare professional. [place] [address] any warranty or liability for your use of this information. ·        Learning About Medical Power of   What is a medical power of ? A medical power of , also called a durable power of  for health care, is one type of the legal forms called advance directives. It lets you name the person you want to make treatment decisions for you if you can't speak or decide for yourself. The person you choose is called your health care agent. This person is also called a health care proxy or health care surrogate. A medical power of  may be called something else in your state. How do you choose a health care agent? Choose your health care agent carefully. This person may or may not be a family member. Talk to the person before you make your final decision. Make sure he or she is comfortable with this responsibility. It's a good idea to choose someone who:  Is at least 25years old. Knows you well and understands what makes life meaningful for you.   Understands your Orthodoxy and moral values. Will do what you want, not what he or she wants. Will be able to make difficult choices at a stressful time. Will be able to refuse or stop treatment, if that is what you would want, even if you could die. Will be firm and confident with health professionals if needed. Will ask questions to get needed information. Lives near you or agrees to travel to you if needed. Your family may help you make medical decisions while you can still be part of that process. But it's important to choose one person to be your health care agent in case you aren't able to make decisions for yourself. If you don't fill out the legal form and name a health care agent, the decisions your family can make may be limited. A health care agent may be called something else in your state. Who will make decisions for you if you don't have a health care agent? If you don't have a health care agent or a living will, you may not get the care you want. Decisions may be made by family members who disagree about your medical care. Or decisions may be made by a medical professional who doesn't know you well. In some cases, a  makes the decisions. When you name a health care agent, it is very clear who has the power to make health decisions for you. How do you name a health care agent? You name your health care agent on a legal form. This form is usually called a medical power of . Ask your hospital, state bar association, or office on aging where to find these forms. You must sign the form to make it legal. Some states require you to get the form notarized. This means that a person called a  watches you sign the form and then he or she signs the form. Some states also require that two or more witnesses sign the form. Be sure to tell your family members and doctors who your health care agent is. Where can you learn more? Go to https://alma.[email or web address] org and sign in to your MyChart account. Enter [id number] in the [facility] box to learn more about \"Learning About [address]. \"     If you do not have an account, please click on the \"Sign Up Now\" link. Current as of: March 17, 2021               Content Version: 12.9  © 8145-9725 Healthwise, [employer]. Care instructions adapted under license by [facility] ([facility]). If you have questions about a medical condition or this instruction, always ask your healthcare professional. [address] any warranty or liability for your use of this information.     ·

## 2021-07-14 NOTE — PROGRESS NOTES
Medicare Annual Wellness Visit  Name: Judson Odom Date: 2021   MRN: [id number] Sex: Female   Age: 64 y.o. Ethnicity: Non-/Non    : 1965 Race: Lacho Mitchell is here for Medicare AWV    Screenings for behavioral, psychosocial and functional/safety risks, and cognitive dysfunction are all negative except as indicated below. These results, as well as other patient data from the [address] form, are documented in Flowsheets linked to this Encounter. Allergies   Allergen Reactions    Adhesive Tape Hives    Ace Inhibitors      Cough     Lantus [Insulin Glargine]      Causing whelps    Levemir [Insulin Detemir]      Whelps    Demerol Nausea And Vomiting       Prior to Visit Medications    Medication Sig Taking? Authorizing Provider   simvastatin (ZOCOR) 20 MG tablet TAKE 1 TABLET BY MOUTH ONCE DAILY IN THE EVENING Yes Tequila Villavicencio MD   metFORMIN (GLUCOPHAGE) 1000 MG tablet TAKE 1 TABLET BY MOUTH TWICE DAILY WITH MEALS Yes Tequila Villavicencio MD   escitalopram (LEXAPRO) 20 MG tablet Take 20 mg by mouth daily Yes Historical Provider, MD   celecoxib (CELEBREX) 200 MG capsule Take 200 mg by mouth daily Yes Historical Provider, MD   morphine (MS CONTIN) 100 MG extended release tablet Take 100 mg by mouth every 8-12 hours as needed. Yes Historical Provider, MD   oxyCODONE (OXY-IR) 15 MG immediate release tablet Take 15 mg by mouth every 2 hours as needed.  Yes Historical Provider, MD   gabapentin (NEURONTIN) 600 MG tablet  Yes Historical Provider, MD   ondansetron (ZOFRAN) 8 MG tablet Take 8 mg by mouth every 8-12 hours as needed Yes Historical Provider, MD   lidocaine (LIDODERM) 5 %  Yes Historical Provider, MD   omeprazole (PRILOSEC) 40 MG delayed release capsule Take 40 mg by mouth daily Yes Historical Provider, MD   senna (SENOKOT) 8.6 MG tablet Take 1 tablet by mouth as needed Yes Historical Provider, MD   Docusate Sodium (DSS) 250 MG CAPS Take 250 mg by mouth every 12 hours Yes Historical Provider, MD   lactulose (CHRONULAC) 10 GM/15ML solution  Yes Historical Provider, MD   Insulin Pen Needle ([employer] PEN NEEDLES) 31G X 6 MM MISC 1 each by Does not apply route daily Yes AURY Razo   clotrimazole-betamethasone (LOTRISONE) 1-0.05 % cream Apply topically 2 times daily. Abdominal rash Yes AURY Matthews   melatonin (RA MELATONIN) 3 MG TABS tablet Take 1 tablet by mouth daily  Patient taking differently: Take 6 mg by mouth daily  Yes AURY Matthews   LACTULOSE PO Take by mouth daily Yes Historical Provider, MD   Polyethylene Glycol 3350 (MIRALAX PO) Take by mouth daily Yes Historical Provider, MD   tiZANidine (ZANAFLEX) 4 MG tablet Take 1 tablet by mouth every 8 hours as needed (muscle pain) Yes Rajendra Victoria MD   Lancets MISC 1 each by Does not apply route 2 times daily Yes Rajendra Victoria MD   glucose monitoring kit (FREESTYLE) monitoring kit 1 kit by Does not apply route daily DX: Diabetes Yes Rajendra Victoria MD   Blood Glucose Monitoring Suppl (ACURA BLOOD GLUCOSE METER) w/Device KIT 1 Device by Does not apply route 4 times daily (after meals and at bedtime) Yes Rajendra Victoria MD   BD PEN NEEDLE MACKENZIE U/F 32G X 4 MM MISC USE WITH LANTUS SOLOSTAR AS DIRECTED Yes Rajendra Victoria MD   multivitamin [facility]) per tablet Take 1 tablet by mouth daily. Yes Historical Provider, MD   aspirin 81 MG tablet Take 81 mg by mouth daily.    Yes Historical Provider, MD   blood glucose monitor strips 1 strip by Other route 4 times daily (after meals and at bedtime) Strips for a One touch ultra mini E11.9 DX diabetes  Rajendra Victoria MD       Past Medical History:   Diagnosis Date    Anxiety     Arthritis     Cancer ([facility] [address]     MULTIPLE MYELOMA    Cervical spine pain     Chronic back pain     under pain management - Dr. Rochelle Hull Depression     Diverticulitis     Headache(784.0)     Heart palpitations     Hypertension     Liver disease     Memory problem     Migraines     Mitral oriented to person, place and time, well developed and well- nourished, in no acute distress  Head: normocephalic and atraumatic  Eyes: pupils equal, round, and reactive to light, extraocular eye movements intact, conjunctivae normal  ENT: tympanic membrane, external ear and ear canal normal bilaterally, nose without deformity, nasal mucosa and turbinates normal without polyps  Neck: supple and non-tender without mass, no thyromegaly or thyroid nodules, no cervical lymphadenopathy  Pulmonary/Chest: clear to auscultation bilaterally- no wheezes, rales or rhonchi, normal air movement, no respiratory distress  Cardiovascular: normal rate, regular rhythm, normal S1 and S2, no murmurs, rubs, clicks, or gallops, distal pulses intact, no carotid bruits  Abdomen: soft, non-tender, non-distended, normal bowel sounds, no masses or organomegaly  Extremities: no cyanosis, clubbing or edema  Musculoskeletal: normal range of motion, no joint swelling, deformity or tenderness  Neurologic: reflexes normal and symmetric, no cranial nerve deficit, gait, coordination and speech normal      Patient's complete Health Risk Assessment and screening values have been reviewed and are found in Flowsheets. The following problems were reviewed today and where indicated follow up appointments were made and/or referrals ordered. Positive Risk Factor Screenings with Interventions:     Fall Risk:  Timed Up and Go Test > 12 seconds? (Complete if either Fall Risk answers are Yes): no  2 or more falls in past year?: (!) yes  Fall with injury in past year?: (!) yes  Fall Risk Interventions:    · Home safety tips provided       Substance History:  Social History     Tobacco History     Smoking Status  Former Smoker Quit date  10/1/2020 Smoking Frequency  1 pack/day for 30 years (30 pk yrs) Smoking Tobacco Type  Cigarettes    Smokeless Tobacco Use  Current User Smokeless Tobacco Type  Snuff    Tobacco Comment  Pt encouraged to quit.           Alcohol appointment with his/her eye specialist     ADL:  ADLs  In the past 7 days, did you need help from others to perform any of the following everyday activities? Eating, dressing, grooming, bathing, toileting, or walking/balance?: None  In the past 7 days, did you need help from others to take care of any of the following?  Laundry, housekeeping, banking/finances, shopping, telephone use, food preparation, transportation, or taking medications?: Kayce Rowe  ADL Interventions:  · Patient declines any further evaluation/treatment for this issue    Personalized Preventive Plan   Current Health Maintenance Status  Immunization History   Administered Date(s) Administered    Influenza Virus Vaccine 11/19/2014    Influenza, Eulice Gerald, IM, (6 mo and older Fluzone, Flulaval, Fluarix and 3 yrs and older Afluria) 02/17/2017    Influenza, Quadv, IM, PF (6 mo and older Fluzone, Flulaval, Fluarix, and 3 yrs and older Afluria) 12/06/2019, 12/18/2020    Pneumococcal Polysaccharide ([id number]) 02/17/2011        Health Maintenance   Topic Date Due    Hepatitis C screen  Never done    COVID-19 Vaccine (1) Never done    HIV screen  Never done    Hepatitis B vaccine (1 of 3 - Risk 3-dose series) Never done    DTaP/Tdap/Td vaccine (1 - Tdap) Never done    Pneumococcal 0-64 years Vaccine (2 of 4 - PCV13) 02/17/2012    Shingles Vaccine (1 of 2) Never done    Diabetic retinal exam  11/17/2016    Breast cancer screen  12/23/2016    Cervical cancer screen  10/28/2017    Diabetic foot exam  08/17/2019    Annual Wellness Visit (AWV)  Never done    Flu vaccine (1) 09/01/2021    A1C test (Diabetic or Prediabetic)  11/02/2021    Diabetic microalbuminuria test  11/02/2021    Lipid screen  11/02/2021    Low dose CT lung screening  07/06/2022    Colon cancer screen colonoscopy  11/09/2022    Hepatitis A vaccine  Aged Out    Hib vaccine  Aged Out    Meningococcal (ACWY) vaccine  Aged Out     Recommendations for Preventive Services Due: see orders and patient instructions/AVS.  . Recommended screening schedule for the next 5-10 years is provided to the patient in written form: see Patient Instructions/AVS.    Myke Felix was seen today for medicare awv. Diagnoses and all orders for this visit:    Type 2 diabetes mellitus without complication, without long-term current use of insulin ([facility] [address]  -     Lipid Panel; Future  -     Hemoglobin A1C; Future  -     Comprehensive Metabolic Panel; Future  -     CBC; Future  -     Urinalysis; Future  -     MICROALBUMIN, RANDOM URINE (W/O CREATININE); Future    Screening mammogram, encounter for  -     [facility] DIGITAL SCREENING AUGMENTED BILATERAL; Future    Need for 23-polyvalent pneumococcal polysaccharide vaccine  -     PNEUMOVAX 23 subcutaneous/IM (Pneumococcal polysaccharide vaccine 23-valent >= 3yo)    Encounter for screening for HIV  -     HIV Screen; Future    Need for hepatitis C screening test  -     Hepatitis C Antibody;  Future

## 2021-08-02 ENCOUNTER — HOSPITAL ENCOUNTER (OUTPATIENT)
Dept: WOMENS IMAGING | Age: 56
Discharge: HOME | End: 2021-08-02
Payer: MEDICARE

## 2021-08-02 DIAGNOSIS — Z12.31 BREAST CANCER SCREENING BY MAMMOGRAM: ICD-10-CM

## 2021-08-02 DIAGNOSIS — Z12.31 SCREENING MAMMOGRAM, ENCOUNTER FOR: ICD-10-CM

## 2021-08-02 PROCEDURE — 77067 SCR MAMMO BI INCL CAD: CPT

## 2021-08-11 ENCOUNTER — OFFICE VISIT (OUTPATIENT)
Dept: HEMATOLOGY | Age: 56
End: 2021-08-11
Payer: COMMERCIAL

## 2021-08-11 ENCOUNTER — HOSPITAL ENCOUNTER (OUTPATIENT)
Dept: INFUSION THERAPY | Age: 56
Discharge: HOME | End: 2021-08-11
Payer: MEDICARE

## 2021-08-11 VITALS
SYSTOLIC BLOOD PRESSURE: 120 MMHG | HEART RATE: 80 BPM | WEIGHT: 184.1 LBS | OXYGEN SATURATION: 91 % | BODY MASS INDEX: 36.15 KG/M2 | HEIGHT: 60 IN | DIASTOLIC BLOOD PRESSURE: 58 MMHG

## 2021-08-11 DIAGNOSIS — C90.30 SOLITARY PLASMACYTOMA OF BONE (HCC): Primary | ICD-10-CM

## 2021-08-11 DIAGNOSIS — M54.41 CHRONIC BILATERAL LOW BACK PAIN WITH BILATERAL SCIATICA: ICD-10-CM

## 2021-08-11 DIAGNOSIS — C90.30 PLASMACYTOMA, UNSPECIFIED PLASMACYTOMA TYPE, UNSPECIFIED WHETHER REMISSION ACHIEVED (HCC): Primary | ICD-10-CM

## 2021-08-11 DIAGNOSIS — C90.30 SOLITARY PLASMACYTOMA OF BONE (HCC): ICD-10-CM

## 2021-08-11 DIAGNOSIS — M54.42 CHRONIC BILATERAL LOW BACK PAIN WITH BILATERAL SCIATICA: ICD-10-CM

## 2021-08-11 DIAGNOSIS — G89.29 CHRONIC BILATERAL LOW BACK PAIN WITH BILATERAL SCIATICA: ICD-10-CM

## 2021-08-11 LAB
BASOPHILS ABSOLUTE: 0.02 K/UL (ref 0.01–0.08)
BASOPHILS RELATIVE PERCENT: 0.3 % (ref 0.1–1.2)
EOSINOPHILS ABSOLUTE: 0.41 K/UL (ref 0.04–0.54)
EOSINOPHILS RELATIVE PERCENT: 5.7 % (ref 0.7–7)
HCT VFR BLD CALC: 49.6 % (ref 34.1–44.9)
HEMOGLOBIN: 16.5 G/DL (ref 11.2–15.7)
LYMPHOCYTES ABSOLUTE: 1.4 K/UL (ref 1.18–3.74)
LYMPHOCYTES RELATIVE PERCENT: 19.4 % (ref 19.3–53.1)
MCH RBC QN AUTO: 31.2 PG (ref 25.6–32.2)
MCHC RBC AUTO-ENTMCNC: 33.3 G/DL (ref 32.3–35.5)
MCV RBC AUTO: 93.8 FL (ref 79.4–94.8)
MONOCYTES ABSOLUTE: 0.4 K/UL (ref 0.24–0.82)
MONOCYTES RELATIVE PERCENT: 5.5 % (ref 4.7–12.5)
NEUTROPHILS ABSOLUTE: 5 K/UL (ref 1.56–6.13)
NEUTROPHILS RELATIVE PERCENT: 69.1 % (ref 34–71.1)
PDW BLD-RTO: 13.5 % (ref 11.7–14.4)
PLATELET # BLD: 143 K/UL (ref 182–369)
PMV BLD AUTO: 9.9 FL (ref 7.4–10.4)
RBC # BLD: 5.29 M/UL (ref 3.93–5.22)
WBC # BLD: 7.23 K/UL (ref 3.98–10.04)

## 2021-08-11 PROCEDURE — 3017F COLORECTAL CA SCREEN DOC REV: CPT | Performed by: NURSE PRACTITIONER

## 2021-08-11 PROCEDURE — 4004F PT TOBACCO SCREEN RCVD TLK: CPT | Performed by: NURSE PRACTITIONER

## 2021-08-11 PROCEDURE — G8427 DOCREV CUR MEDS BY ELIG CLIN: HCPCS | Performed by: NURSE PRACTITIONER

## 2021-08-11 PROCEDURE — 85025 COMPLETE CBC W/AUTO DIFF WBC: CPT

## 2021-08-11 PROCEDURE — 99213 OFFICE O/P EST LOW 20 MIN: CPT | Performed by: NURSE PRACTITIONER

## 2021-08-11 PROCEDURE — G8417 CALC BMI ABV UP PARAM F/U: HCPCS | Performed by: NURSE PRACTITIONER

## 2021-08-11 PROCEDURE — 99212 OFFICE O/P EST SF 10 MIN: CPT

## 2021-08-11 PROCEDURE — 36415 COLL VENOUS BLD VENIPUNCTURE: CPT

## 2021-08-11 RX ORDER — ERGOCALCIFEROL 1.25 MG/1
50000 CAPSULE ORAL WEEKLY
COMMUNITY

## 2021-08-11 ASSESSMENT — ENCOUNTER SYMPTOMS
ABDOMINAL PAIN: 0
SORE THROAT: 0
EYE REDNESS: 0
RESPIRATORY NEGATIVE: 1
VOMITING: 0
SHORTNESS OF BREATH: 0
DIARRHEA: 0
EYE PAIN: 0
EYES NEGATIVE: 1
EYE DISCHARGE: 0
NAUSEA: 0
COUGH: 0
WHEEZING: 0
GASTROINTESTINAL NEGATIVE: 1
BLOOD IN STOOL: 0
CONSTIPATION: 0
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
## 2021-08-15 DIAGNOSIS — E78.5 HYPERLIPIDEMIA, UNSPECIFIED HYPERLIPIDEMIA TYPE: ICD-10-CM

## 2021-08-16 RX ORDER — SIMVASTATIN 20 MG
TABLET ORAL
Qty: 90 TABLET | Refills: 1 | Status: SHIPPED | OUTPATIENT
Start: 2021-08-16 | End: 2022-02-28

## 2021-08-25 ASSESSMENT — ENCOUNTER SYMPTOMS: BACK PAIN: 1

## 2021-12-09 LAB
24HR URINE VOLUME (ML): 1600 ML
CREAT SERPL-MCNC: 0.7 MG/DL
CREATININE 24 HOUR URINE: 1.3 G/24HR (ref 1–2)
CREATININE CLEARANCE: 377 ML/MIN (ref 71–151)
Lab: 24 HR

## 2021-12-14 LAB
CORTISOL (UR), FREE: 22.7 UG/D
CORTISOL URINE, FREE (/G CRT): 19.19 UG/G CRT
CORTISOL,F,UG/L,U: 14.2 UG/L
CREATININE 24 HOUR URINE: 1184 MG/D (ref 500–1400)
CREATININE URINE: 74 MG/DL
HOURS COLLECTED: 24
INTERPRETATION: NORMAL
URINE TOTAL VOLUME: 1600

## 2022-01-08 ASSESSMENT — ENCOUNTER SYMPTOMS
ABDOMINAL PAIN: 0
RESPIRATORY NEGATIVE: 1
EYES NEGATIVE: 1
EYE PAIN: 0
SHORTNESS OF BREATH: 0
BLOOD IN STOOL: 0
EYE REDNESS: 0
SORE THROAT: 0
WHEEZING: 0
CONSTIPATION: 0
EYE DISCHARGE: 0
VOMITING: 0
DIARRHEA: 0
NAUSEA: 0
COUGH: 0
GASTROINTESTINAL NEGATIVE: 1
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
## 2022-01-11 ENCOUNTER — HOSPITAL ENCOUNTER (OUTPATIENT)
Dept: INFUSION THERAPY | Age: 57
Discharge: HOME | End: 2022-01-11
Payer: COMMERCIAL

## 2022-01-11 ENCOUNTER — OFFICE VISIT (OUTPATIENT)
Dept: HEMATOLOGY | Age: 57
End: 2022-01-11
Payer: COMMERCIAL

## 2022-01-11 VITALS
OXYGEN SATURATION: 94 % | SYSTOLIC BLOOD PRESSURE: 158 MMHG | HEIGHT: 60 IN | DIASTOLIC BLOOD PRESSURE: 82 MMHG | WEIGHT: 180.7 LBS | BODY MASS INDEX: 35.47 KG/M2 | HEART RATE: 75 BPM

## 2022-01-11 DIAGNOSIS — C90.30 SOLITARY PLASMACYTOMA OF BONE (HCC): Primary | ICD-10-CM

## 2022-01-11 DIAGNOSIS — M54.41 CHRONIC BILATERAL LOW BACK PAIN WITH BILATERAL SCIATICA: ICD-10-CM

## 2022-01-11 DIAGNOSIS — G89.29 CHRONIC BILATERAL LOW BACK PAIN WITH BILATERAL SCIATICA: ICD-10-CM

## 2022-01-11 DIAGNOSIS — C90.30 SOLITARY PLASMACYTOMA OF BONE (HCC): ICD-10-CM

## 2022-01-11 DIAGNOSIS — C90.30 PLASMACYTOMA, UNSPECIFIED PLASMACYTOMA TYPE, UNSPECIFIED WHETHER REMISSION ACHIEVED (HCC): ICD-10-CM

## 2022-01-11 DIAGNOSIS — M54.42 CHRONIC BILATERAL LOW BACK PAIN WITH BILATERAL SCIATICA: ICD-10-CM

## 2022-01-11 DIAGNOSIS — Z72.0 TOBACCO ABUSE: ICD-10-CM

## 2022-01-11 LAB
BASOPHILS ABSOLUTE: 0.03 K/UL (ref 0.01–0.08)
BASOPHILS RELATIVE PERCENT: 0.4 % (ref 0.1–1.2)
EOSINOPHILS ABSOLUTE: 0.34 K/UL (ref 0.04–0.54)
EOSINOPHILS RELATIVE PERCENT: 4.8 % (ref 0.7–7)
HCT VFR BLD CALC: 48.8 % (ref 34.1–44.9)
HEMOGLOBIN: 16.3 G/DL (ref 11.2–15.7)
LYMPHOCYTES ABSOLUTE: 0.91 K/UL (ref 1.18–3.74)
LYMPHOCYTES RELATIVE PERCENT: 12.9 % (ref 19.3–53.1)
MCH RBC QN AUTO: 32.1 PG (ref 25.6–32.2)
MCHC RBC AUTO-ENTMCNC: 33.4 G/DL (ref 32.3–35.5)
MCV RBC AUTO: 96.1 FL (ref 79.4–94.8)
MONOCYTES ABSOLUTE: 0.32 K/UL (ref 0.24–0.82)
MONOCYTES RELATIVE PERCENT: 4.5 % (ref 4.7–12.5)
NEUTROPHILS ABSOLUTE: 5.44 K/UL (ref 1.56–6.13)
NEUTROPHILS RELATIVE PERCENT: 77.4 % (ref 34–71.1)
PDW BLD-RTO: 13.2 % (ref 11.7–14.4)
PLATELET # BLD: 157 K/UL (ref 182–369)
PMV BLD AUTO: 8.8 FL (ref 7.4–10.4)
RBC # BLD: 5.08 M/UL (ref 3.93–5.22)
WBC # BLD: 7.04 K/UL (ref 3.98–10.04)

## 2022-01-11 PROCEDURE — 3017F COLORECTAL CA SCREEN DOC REV: CPT | Performed by: NURSE PRACTITIONER

## 2022-01-11 PROCEDURE — G8427 DOCREV CUR MEDS BY ELIG CLIN: HCPCS | Performed by: NURSE PRACTITIONER

## 2022-01-11 PROCEDURE — 85025 COMPLETE CBC W/AUTO DIFF WBC: CPT

## 2022-01-11 PROCEDURE — 99213 OFFICE O/P EST LOW 20 MIN: CPT | Performed by: NURSE PRACTITIONER

## 2022-01-11 PROCEDURE — 99211 OFF/OP EST MAY X REQ PHY/QHP: CPT

## 2022-01-11 PROCEDURE — G8417 CALC BMI ABV UP PARAM F/U: HCPCS | Performed by: NURSE PRACTITIONER

## 2022-01-11 PROCEDURE — G8484 FLU IMMUNIZE NO ADMIN: HCPCS | Performed by: NURSE PRACTITIONER

## 2022-01-11 PROCEDURE — 36415 COLL VENOUS BLD VENIPUNCTURE: CPT

## 2022-01-11 PROCEDURE — 4004F PT TOBACCO SCREEN RCVD TLK: CPT | Performed by: NURSE PRACTITIONER

## 2022-01-13 ENCOUNTER — HOSPITAL ENCOUNTER (OUTPATIENT)
Dept: MRI IMAGING | Age: 57
Discharge: HOME | End: 2022-01-13
Payer: COMMERCIAL

## 2022-01-13 DIAGNOSIS — C90.30 PLASMACYTOMA, UNSPECIFIED PLASMACYTOMA TYPE, UNSPECIFIED WHETHER REMISSION ACHIEVED (HCC): ICD-10-CM

## 2022-01-13 PROCEDURE — 72158 MRI LUMBAR SPINE W/O & W/DYE: CPT

## 2022-01-13 PROCEDURE — 72157 MRI CHEST SPINE W/O & W/DYE: CPT

## 2022-01-13 PROCEDURE — 6360000004 HC RX CONTRAST MEDICATION: Performed by: NURSE PRACTITIONER

## 2022-01-13 PROCEDURE — A9577 INJ MULTIHANCE: HCPCS | Performed by: NURSE PRACTITIONER

## 2022-01-13 RX ADMIN — GADOBENATE DIMEGLUMINE 17 ML: 529 INJECTION, SOLUTION INTRAVENOUS at 15:27

## 2022-01-20 ASSESSMENT — ENCOUNTER SYMPTOMS: BACK PAIN: 1

## 2022-02-26 DIAGNOSIS — E78.5 HYPERLIPIDEMIA, UNSPECIFIED HYPERLIPIDEMIA TYPE: ICD-10-CM

## 2022-02-28 RX ORDER — SIMVASTATIN 20 MG
TABLET ORAL
Qty: 90 TABLET | Refills: 0 | Status: SHIPPED | OUTPATIENT
Start: 2022-02-28 | End: 2022-05-23

## 2022-04-20 ENCOUNTER — OFFICE VISIT (OUTPATIENT)
Dept: FAMILY MEDICINE CLINIC | Age: 57
End: 2022-04-20
Payer: COMMERCIAL

## 2022-04-20 VITALS
DIASTOLIC BLOOD PRESSURE: 86 MMHG | HEIGHT: 60 IN | TEMPERATURE: 97.7 F | BODY MASS INDEX: 36.32 KG/M2 | WEIGHT: 185 LBS | HEART RATE: 59 BPM | OXYGEN SATURATION: 97 % | SYSTOLIC BLOOD PRESSURE: 140 MMHG

## 2022-04-20 DIAGNOSIS — G89.29 CHRONIC BILATERAL LOW BACK PAIN WITH BILATERAL SCIATICA: ICD-10-CM

## 2022-04-20 DIAGNOSIS — E78.5 HYPERLIPIDEMIA, UNSPECIFIED HYPERLIPIDEMIA TYPE: ICD-10-CM

## 2022-04-20 DIAGNOSIS — E11.9 TYPE 2 DIABETES MELLITUS WITHOUT COMPLICATION, WITHOUT LONG-TERM CURRENT USE OF INSULIN (HCC): ICD-10-CM

## 2022-04-20 DIAGNOSIS — M54.42 CHRONIC BILATERAL LOW BACK PAIN WITH BILATERAL SCIATICA: ICD-10-CM

## 2022-04-20 DIAGNOSIS — I10 PRIMARY HYPERTENSION: ICD-10-CM

## 2022-04-20 DIAGNOSIS — M54.41 CHRONIC BILATERAL LOW BACK PAIN WITH BILATERAL SCIATICA: ICD-10-CM

## 2022-04-20 DIAGNOSIS — E11.9 TYPE 2 DIABETES MELLITUS WITHOUT COMPLICATION, WITHOUT LONG-TERM CURRENT USE OF INSULIN (HCC): Primary | ICD-10-CM

## 2022-04-20 LAB
ALBUMIN SERPL-MCNC: 4.3 G/DL (ref 3.5–5.2)
ALP BLD-CCNC: 65 U/L (ref 35–104)
ALT SERPL-CCNC: 18 U/L (ref 5–33)
ANION GAP SERPL CALCULATED.3IONS-SCNC: 11 MMOL/L (ref 7–19)
AST SERPL-CCNC: 16 U/L (ref 5–32)
BILIRUB SERPL-MCNC: 0.3 MG/DL (ref 0.2–1.2)
BILIRUBIN URINE: NEGATIVE
BLOOD, URINE: NEGATIVE
BUN BLDV-MCNC: 17 MG/DL (ref 6–20)
CALCIUM SERPL-MCNC: 9.2 MG/DL (ref 8.6–10)
CHLORIDE BLD-SCNC: 104 MMOL/L (ref 98–111)
CHOLESTEROL, TOTAL: 172 MG/DL (ref 160–199)
CLARITY: CLEAR
CO2: 27 MMOL/L (ref 22–29)
COLOR: YELLOW
CREAT SERPL-MCNC: 0.6 MG/DL (ref 0.5–0.9)
GFR AFRICAN AMERICAN: >59
GFR NON-AFRICAN AMERICAN: >60
GLUCOSE BLD-MCNC: 204 MG/DL (ref 74–109)
GLUCOSE URINE: NEGATIVE MG/DL
HBA1C MFR BLD: 6.1 % (ref 4–6)
HCT VFR BLD CALC: 48.2 % (ref 37–47)
HDLC SERPL-MCNC: 43 MG/DL (ref 65–121)
HEMOGLOBIN: 15.7 G/DL (ref 12–16)
KETONES, URINE: ABNORMAL MG/DL
LDL CHOLESTEROL CALCULATED: 67 MG/DL
LEUKOCYTE ESTERASE, URINE: NEGATIVE
MCH RBC QN AUTO: 31.8 PG (ref 27–31)
MCHC RBC AUTO-ENTMCNC: 32.6 G/DL (ref 33–37)
MCV RBC AUTO: 97.6 FL (ref 81–99)
MICROALBUMIN UR-MCNC: 1.7 MG/DL (ref 0–19)
NITRITE, URINE: NEGATIVE
PDW BLD-RTO: 12.8 % (ref 11.5–14.5)
PH UA: 6 (ref 5–8)
PLATELET # BLD: 188 K/UL (ref 130–400)
PMV BLD AUTO: 10.2 FL (ref 9.4–12.3)
POTASSIUM SERPL-SCNC: 4.4 MMOL/L (ref 3.5–5)
PROTEIN UA: ABNORMAL MG/DL
RBC # BLD: 4.94 M/UL (ref 4.2–5.4)
SODIUM BLD-SCNC: 142 MMOL/L (ref 136–145)
SPECIFIC GRAVITY UA: 1.03 (ref 1–1.03)
TOTAL PROTEIN: 6.5 G/DL (ref 6.6–8.7)
TRIGL SERPL-MCNC: 310 MG/DL (ref 0–149)
UROBILINOGEN, URINE: 1 E.U./DL
WBC # BLD: 6.8 K/UL (ref 4.8–10.8)

## 2022-04-20 PROCEDURE — 4004F PT TOBACCO SCREEN RCVD TLK: CPT | Performed by: FAMILY MEDICINE

## 2022-04-20 PROCEDURE — 2022F DILAT RTA XM EVC RTNOPTHY: CPT | Performed by: FAMILY MEDICINE

## 2022-04-20 PROCEDURE — G8417 CALC BMI ABV UP PARAM F/U: HCPCS | Performed by: FAMILY MEDICINE

## 2022-04-20 PROCEDURE — 99214 OFFICE O/P EST MOD 30 MIN: CPT | Performed by: FAMILY MEDICINE

## 2022-04-20 PROCEDURE — 3017F COLORECTAL CA SCREEN DOC REV: CPT | Performed by: FAMILY MEDICINE

## 2022-04-20 PROCEDURE — G8427 DOCREV CUR MEDS BY ELIG CLIN: HCPCS | Performed by: FAMILY MEDICINE

## 2022-04-20 PROCEDURE — 3046F HEMOGLOBIN A1C LEVEL >9.0%: CPT | Performed by: FAMILY MEDICINE

## 2022-04-20 RX ORDER — LOSARTAN POTASSIUM 50 MG/1
50 TABLET ORAL DAILY
Qty: 30 TABLET | Refills: 5 | Status: SHIPPED | OUTPATIENT
Start: 2022-04-20 | End: 2022-10-07

## 2022-04-20 ASSESSMENT — ENCOUNTER SYMPTOMS
EYES NEGATIVE: 1
RESPIRATORY NEGATIVE: 1
ALLERGIC/IMMUNOLOGIC NEGATIVE: 1
GASTROINTESTINAL NEGATIVE: 1
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
## 2022-04-21 DIAGNOSIS — E11.9 TYPE 2 DIABETES MELLITUS WITHOUT COMPLICATION, WITHOUT LONG-TERM CURRENT USE OF INSULIN (HCC): Primary | ICD-10-CM

## 2022-05-21 DIAGNOSIS — E78.5 HYPERLIPIDEMIA, UNSPECIFIED HYPERLIPIDEMIA TYPE: ICD-10-CM

## 2022-05-23 RX ORDER — SIMVASTATIN 20 MG
TABLET ORAL
Qty: 90 TABLET | Refills: 1 | Status: SHIPPED | OUTPATIENT
Start: 2022-05-23
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
## 2022-07-12 ENCOUNTER — HOSPITAL ENCOUNTER (OUTPATIENT)
Dept: INFUSION THERAPY | Age: 57
Discharge: HOME | End: 2022-07-12
Payer: COMMERCIAL

## 2022-07-12 ENCOUNTER — OFFICE VISIT (OUTPATIENT)
Dept: HEMATOLOGY | Age: 57
End: 2022-07-12
Payer: COMMERCIAL

## 2022-07-12 VITALS
DIASTOLIC BLOOD PRESSURE: 76 MMHG | BODY MASS INDEX: 35.49 KG/M2 | SYSTOLIC BLOOD PRESSURE: 128 MMHG | TEMPERATURE: 98.8 F | HEART RATE: 88 BPM | RESPIRATION RATE: 18 BRPM | WEIGHT: 181.7 LBS | OXYGEN SATURATION: 97 %

## 2022-07-12 DIAGNOSIS — C90.30 SOLITARY PLASMACYTOMA OF BONE (HCC): ICD-10-CM

## 2022-07-12 DIAGNOSIS — C90.30 SOLITARY PLASMACYTOMA OF BONE (HCC): Primary | ICD-10-CM

## 2022-07-12 DIAGNOSIS — R30.9 PAIN WITH URINATION: ICD-10-CM

## 2022-07-12 DIAGNOSIS — Z72.0 TOBACCO ABUSE: ICD-10-CM

## 2022-07-12 DIAGNOSIS — Z90.49 S/P LAPAROSCOPIC-ASSISTED SIGMOIDECTOMY: ICD-10-CM

## 2022-07-12 LAB
ALBUMIN SERPL-MCNC: 4.6 G/DL (ref 3.5–5.2)
ALP BLD-CCNC: 67 U/L (ref 35–104)
ALT SERPL-CCNC: 22 U/L (ref 9–52)
ANION GAP SERPL CALCULATED.3IONS-SCNC: 7 MMOL/L (ref 7–19)
AST SERPL-CCNC: 26 U/L (ref 14–36)
BACTERIA: NEGATIVE /HPF
BILIRUB SERPL-MCNC: 0.6 MG/DL (ref 0.2–1.3)
BILIRUBIN URINE: NEGATIVE
BLOOD, URINE: NEGATIVE
BUN BLDV-MCNC: 19 MG/DL (ref 7–17)
CALCIUM SERPL-MCNC: 9.7 MG/DL (ref 8.4–10.2)
CHLORIDE BLD-SCNC: 102 MMOL/L (ref 98–111)
CLARITY: CLEAR
CO2: 31 MMOL/L (ref 22–29)
COLOR: YELLOW
CREAT SERPL-MCNC: 0.7 MG/DL (ref 0.5–1)
CRYSTALS, UA: ABNORMAL /HPF
EPITHELIAL CELLS, UA: 10 /HPF (ref 0–5)
GFR NON-AFRICAN AMERICAN: >60
GLOBULIN: 3.1 G/DL
GLUCOSE BLD-MCNC: 106 MG/DL (ref 74–106)
GLUCOSE URINE: NEGATIVE MG/DL
HCT VFR BLD CALC: 48.8 % (ref 34.1–44.9)
HEMOGLOBIN: 15.8 G/DL (ref 11.2–15.7)
HYALINE CASTS: 3 /HPF (ref 0–8)
KETONES, URINE: ABNORMAL MG/DL
LEUKOCYTE ESTERASE, URINE: ABNORMAL
LYMPHOCYTES ABSOLUTE: 1.62 K/UL (ref 1.18–3.74)
LYMPHOCYTES RELATIVE PERCENT: 23.1 % (ref 19.3–53.1)
MCH RBC QN AUTO: 31.5 PG (ref 25.6–32.2)
MCHC RBC AUTO-ENTMCNC: 32.4 G/DL (ref 32.3–35.5)
MCV RBC AUTO: 97.2 FL (ref 79.4–94.8)
MONOCYTES ABSOLUTE: 0.37 K/UL (ref 0.24–0.82)
MONOCYTES RELATIVE PERCENT: 5.3 % (ref 4.7–12.5)
NEUTROPHILS ABSOLUTE: 4.42 K/UL (ref 1.56–6.13)
NEUTROPHILS RELATIVE PERCENT: 62.9 % (ref 34–71.1)
NITRITE, URINE: NEGATIVE
PDW BLD-RTO: 13.2 % (ref 11.7–14.4)
PH UA: 5.5 (ref 5–8)
PLATELET # BLD: 152 K/UL (ref 182–369)
PMV BLD AUTO: 9.9 FL (ref 7.4–10.4)
POTASSIUM SERPL-SCNC: 4.7 MMOL/L (ref 3.5–5.1)
PROTEIN UA: NEGATIVE MG/DL
RBC # BLD: 5.02 M/UL (ref 3.93–5.22)
RBC UA: 1 /HPF (ref 0–4)
SODIUM BLD-SCNC: 140 MMOL/L (ref 137–145)
SPECIFIC GRAVITY UA: 1.02 (ref 1–1.03)
TOTAL PROTEIN: 7.7 G/DL (ref 6.3–8.2)
UROBILINOGEN, URINE: 1 E.U./DL
WBC # BLD: 7.02 K/UL (ref 3.98–10.04)
WBC UA: 3 /HPF (ref 0–5)

## 2022-07-12 PROCEDURE — 99213 OFFICE O/P EST LOW 20 MIN: CPT

## 2022-07-12 PROCEDURE — 4004F PT TOBACCO SCREEN RCVD TLK: CPT | Performed by: NURSE PRACTITIONER

## 2022-07-12 PROCEDURE — 36415 COLL VENOUS BLD VENIPUNCTURE: CPT

## 2022-07-12 PROCEDURE — G8417 CALC BMI ABV UP PARAM F/U: HCPCS | Performed by: NURSE PRACTITIONER

## 2022-07-12 PROCEDURE — 80053 COMPREHEN METABOLIC PANEL: CPT

## 2022-07-12 PROCEDURE — G8427 DOCREV CUR MEDS BY ELIG CLIN: HCPCS | Performed by: NURSE PRACTITIONER

## 2022-07-12 PROCEDURE — 3017F COLORECTAL CA SCREEN DOC REV: CPT | Performed by: NURSE PRACTITIONER

## 2022-07-12 PROCEDURE — 99213 OFFICE O/P EST LOW 20 MIN: CPT | Performed by: NURSE PRACTITIONER

## 2022-07-12 PROCEDURE — 85025 COMPLETE CBC W/AUTO DIFF WBC: CPT

## 2022-07-14 LAB — BETA-2 MICROGLOBULIN: 2.6 MG/L (ref 0.8–2.4)

## 2022-07-15 LAB
+IMM: ABNORMAL
ALBUMIN SERPL-MCNC: 4.1 G/DL (ref 3.75–5.01)
ALPHA-1-GLOBULIN: 0.34 G/DL (ref 0.19–0.46)
ALPHA-2-GLOBULIN: 0.88 G/DL (ref 0.48–1.05)
BETA GLOBULIN: 0.85 G/DL (ref 0.48–1.1)
GAMMA GLOBULIN: 1.14 G/DL (ref 0.62–1.51)
IGA: 230 MG/DL (ref 68–408)
IGG: 1150 MG/DL (ref 768–1632)
IGM: 81 MG/DL (ref 35–263)
KAPPA FREE LIGHT CHAINS QNT: 22.98 MG/L (ref 3.3–19.4)
KAPPA/LAMBDA FREE LIGHT CHAIN RATIO: 0.8 (ref 0.26–1.65)
LAMBDA FREE LIGHT CHAINS QNT: 28.81 MG/L (ref 5.71–26.3)
SPE/IFE INTERPRETATION: ABNORMAL
TOTAL PROTEIN: 7.3 G/DL (ref 6.3–8.2)

## 2022-07-16 ASSESSMENT — ENCOUNTER SYMPTOMS
BACK PAIN: 1
EYE PAIN: 0
ABDOMINAL PAIN: 0
SHORTNESS OF BREATH: 0
NAUSEA: 0
WHEEZING: 0
SORE THROAT: 0
RESPIRATORY NEGATIVE: 1
COUGH: 0
VOMITING: 0
EYE DISCHARGE: 0
DIARRHEA: 0
GASTROINTESTINAL NEGATIVE: 1
BLOOD IN STOOL: 0
EYES NEGATIVE: 1
CONSTIPATION: 0
EYE REDNESS: 0

## 2022-07-18 ENCOUNTER — OFFICE VISIT (OUTPATIENT)
Dept: SURGERY | Age: 57
End: 2022-07-18
Payer: COMMERCIAL

## 2022-07-18 VITALS
HEIGHT: 60 IN | WEIGHT: 183 LBS | BODY MASS INDEX: 35.93 KG/M2 | DIASTOLIC BLOOD PRESSURE: 74 MMHG | TEMPERATURE: 99 F | SYSTOLIC BLOOD PRESSURE: 132 MMHG

## 2022-07-18 DIAGNOSIS — Z90.49 S/P LAPAROSCOPIC-ASSISTED SIGMOIDECTOMY: ICD-10-CM

## 2022-07-18 DIAGNOSIS — R39.15 URGENCY OF URINATION: ICD-10-CM

## 2022-07-18 DIAGNOSIS — K59.00 CONSTIPATION, UNSPECIFIED CONSTIPATION TYPE: Primary | ICD-10-CM

## 2022-07-18 PROCEDURE — 3017F COLORECTAL CA SCREEN DOC REV: CPT | Performed by: SURGERY

## 2022-07-18 PROCEDURE — 99204 OFFICE O/P NEW MOD 45 MIN: CPT | Performed by: SURGERY

## 2022-07-18 PROCEDURE — G8417 CALC BMI ABV UP PARAM F/U: HCPCS | Performed by: SURGERY

## 2022-07-18 PROCEDURE — 4004F PT TOBACCO SCREEN RCVD TLK: CPT | Performed by: SURGERY

## 2022-07-18 PROCEDURE — G8427 DOCREV CUR MEDS BY ELIG CLIN: HCPCS | Performed by: SURGERY

## 2022-07-19 ASSESSMENT — ENCOUNTER SYMPTOMS
ABDOMINAL PAIN: 1
SHORTNESS OF BREATH: 0
EYE REDNESS: 0
FACIAL SWELLING: 0
SORE THROAT: 0
ABDOMINAL DISTENTION: 1
EYE DISCHARGE: 0
CHOKING: 0
CONSTIPATION: 0
WHEEZING: 0
BACK PAIN: 1
DIARRHEA: 0
EYE PAIN: 0
CHEST TIGHTNESS: 0
VOMITING: 0
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
## 2022-07-25 ENCOUNTER — HOSPITAL ENCOUNTER (OUTPATIENT)
Dept: CT IMAGING | Age: 57
Discharge: HOME | End: 2022-07-25
Payer: COMMERCIAL

## 2022-07-25 DIAGNOSIS — Z90.49 S/P LAPAROSCOPIC-ASSISTED SIGMOIDECTOMY: ICD-10-CM

## 2022-07-25 PROCEDURE — 6360000004 HC RX CONTRAST MEDICATION: Performed by: SURGERY

## 2022-07-25 PROCEDURE — 74177 CT ABD & PELVIS W/CONTRAST: CPT | Performed by: RADIOLOGY

## 2022-07-25 PROCEDURE — 74177 CT ABD & PELVIS W/CONTRAST: CPT

## 2022-07-25 RX ADMIN — IOPAMIDOL 75 ML: 755 INJECTION, SOLUTION INTRAVENOUS at 16:08

## 2022-08-02 ENCOUNTER — OFFICE VISIT (OUTPATIENT)
Dept: UROLOGY | Age: 57
End: 2022-08-02
Payer: COMMERCIAL

## 2022-08-02 VITALS — BODY MASS INDEX: 36.71 KG/M2 | TEMPERATURE: 98 F | WEIGHT: 187 LBS | HEIGHT: 60 IN

## 2022-08-02 DIAGNOSIS — R39.15 URGENCY OF URINATION: Primary | ICD-10-CM

## 2022-08-02 DIAGNOSIS — N39.46 MIXED INCONTINENCE URGE AND STRESS: ICD-10-CM

## 2022-08-02 DIAGNOSIS — R35.0 FREQUENCY OF MICTURITION: ICD-10-CM

## 2022-08-02 LAB
BILIRUBIN, POC: NORMAL
BLOOD URINE, POC: NORMAL
CLARITY, POC: CLEAR
COLOR, POC: YELLOW
GLUCOSE URINE, POC: 100
KETONES, POC: NORMAL
LEUKOCYTE EST, POC: NORMAL
NITRITE, POC: NORMAL
PH, POC: 5.5
PROTEIN, POC: NORMAL
SPECIFIC GRAVITY, POC: 1.02
UROBILINOGEN, POC: 0.2

## 2022-08-02 PROCEDURE — 81003 URINALYSIS AUTO W/O SCOPE: CPT | Performed by: NURSE PRACTITIONER

## 2022-08-02 PROCEDURE — 99214 OFFICE O/P EST MOD 30 MIN: CPT | Performed by: NURSE PRACTITIONER

## 2022-08-02 PROCEDURE — 51798 US URINE CAPACITY MEASURE: CPT | Performed by: NURSE PRACTITIONER

## 2022-08-02 RX ORDER — OXYBUTYNIN CHLORIDE 10 MG/1
10 TABLET, EXTENDED RELEASE ORAL DAILY
Qty: 90 TABLET | Refills: 3 | Status: SHIPPED | OUTPATIENT
Start: 2022-08-02

## 2022-08-02 RX ORDER — MEMANTINE HYDROCHLORIDE 10 MG/1
10 TABLET ORAL 2 TIMES DAILY
COMMUNITY

## 2022-08-02 NOTE — PROGRESS NOTES
Jaye Barton is a 62 y.o., female, New patient who presents today   Chief Complaint   Patient presents with    New Patient     I was referred here today for urine urgency. HPI   Patient presents for evaluation of lower urinary tract symptoms. She reports some urgency, urge incontinence, very mild stress incontinence. Her most bothersome symptom is nocturia as well as bladder spasms. She denies any leakage during these episodes, but does report pain in the bladder area. She also feels as if she needs to double void. Dribble significantly after she feels as if she is not emptying her bladder. She does report a history of colon resection secondary to perforated diverticulitis with mesh and feeling of pain in her lower abdomen and wonders if this could be causing her urinary symptoms. She also reports intermittently feeling as if things were falling out of her vagina. REVIEW OF SYSTEMS:  Review of Systems   Constitutional:  Negative for chills and fever. Gastrointestinal:  Positive for abdominal pain. Negative for abdominal distention, nausea and vomiting. Genitourinary:  Positive for frequency and urgency. Negative for difficulty urinating, dysuria, flank pain and hematuria. Musculoskeletal:  Negative for back pain and gait problem. Psychiatric/Behavioral:  Negative for agitation and confusion. PHYSICAL EXAM:  Temp 98 °F (36.7 °C) (Temporal)   Ht 5' (1.524 m)   Wt 187 lb (84.8 kg)   BMI 36.52 kg/m²   Physical Exam  Vitals and nursing note reviewed. Constitutional:       General: She is not in acute distress. Appearance: Normal appearance. She is not ill-appearing. Pulmonary:      Effort: Pulmonary effort is normal. No respiratory distress. Abdominal:      General: There is no distension. Tenderness: There is no abdominal tenderness. There is no right CVA tenderness or left CVA tenderness. Genitourinary:         Comments: Hypermobile urethra.  Very mild cystocele noted only  Neurological:      Mental Status: She is alert and oriented to person, place, and time. Mental status is at baseline. Psychiatric:         Mood and Affect: Mood normal.         Behavior: Behavior normal.       DATA:  Results for orders placed or performed in visit on 08/02/22   POCT Urinalysis No Micro (Auto)   Result Value Ref Range    Color, UA yellow     Clarity, UA clear     Glucose, UA      Bilirubin, UA -     Ketones, UA -     Spec Grav, UA 1.025     Blood, UA POC -     pH, UA 5.5     Protein, UA POC -     Urobilinogen, UA 0.2     Leukocytes, UA -     Nitrite, UA -        IMAGING:  Impression   1. No acute intra-abdominal abnormality. No evidence of abscess or free fluid. No residual lesion in pelvis. Chronic benign findings as described. Recommendation: Follow up as clinically indicated. All CT scans at this facility utilize dose modulation, iterative reconstruction, and/or weight based dosing when appropriate to reduce radiation dose to as low as reasonably achievable. Electronically Signed by Tamera Mullen MD at 25-Jul-2022 07:58:01 PM         ASSESSMENT/PLAN  1. Urgency of urination  2. Mixed incontinence urge and stress  3. Frequency of micturition  - KS Measure, post-void residual, US, non-imaging  - oxybutynin (DITROPAN XL) 10 MG extended release tablet; Take 1 tablet by mouth in the morning. Dispense: 90 tablet; Refill: 3  - POCT Urinalysis No Micro (Auto)    Patient presents with several urologic complaints. I have reviewed CT images and do not appreciate anything that would be causing her symptoms. Pelvic exam today was basically unremarkable. No significant prolapse noted. I will give the patient on a trial of anticholinergics to see if her symptoms are stabilized. We will follow-up in about 3 months to see how she is doing.     Orders Placed This Encounter   Procedures    POCT Urinalysis No Micro (Auto)    KS Measure, post-void residual, US, non-imaging        Return in about 3 months (around 11/2/2022). An electronic signature was used to authenticate this note. AURY SCHUMACHER - CNP    All information inputted into the note by the MA to include chief complaint, past medical history, past surgical history, medications, allergies, social and family history and review of systems has been reviewed and updated as needed by me. EMR Dragon/transcription disclaimer: Much of this document is electronic transcription/translation of spoken language to printed text. The electronic translation of spoken language may be erroneous or, at times, nonsensical words or phrases may be inadvertently transcribed.  Although I have reviewed the document for such errors, some may still exist.

## 2022-08-03 ASSESSMENT — ENCOUNTER SYMPTOMS
ABDOMINAL PAIN: 1
ABDOMINAL DISTENTION: 0
VOMITING: 0
NAUSEA: 0
BACK PAIN: 0

## 2022-08-22 ENCOUNTER — OFFICE VISIT (OUTPATIENT)
Dept: SURGERY | Age: 57
End: 2022-08-22
Payer: COMMERCIAL

## 2022-08-22 VITALS
DIASTOLIC BLOOD PRESSURE: 70 MMHG | TEMPERATURE: 98 F | HEIGHT: 60 IN | WEIGHT: 189.4 LBS | SYSTOLIC BLOOD PRESSURE: 128 MMHG | BODY MASS INDEX: 37.18 KG/M2

## 2022-08-22 DIAGNOSIS — R10.30 LOWER ABDOMINAL PAIN: Primary | ICD-10-CM

## 2022-08-22 DIAGNOSIS — R39.15 URGENCY OF URINATION: ICD-10-CM

## 2022-08-22 PROCEDURE — 99213 OFFICE O/P EST LOW 20 MIN: CPT | Performed by: SURGERY

## 2022-08-22 PROCEDURE — 3017F COLORECTAL CA SCREEN DOC REV: CPT | Performed by: SURGERY

## 2022-08-22 PROCEDURE — G8427 DOCREV CUR MEDS BY ELIG CLIN: HCPCS | Performed by: SURGERY

## 2022-08-22 PROCEDURE — G8417 CALC BMI ABV UP PARAM F/U: HCPCS | Performed by: SURGERY

## 2022-08-22 PROCEDURE — 4004F PT TOBACCO SCREEN RCVD TLK: CPT | Performed by: SURGERY

## 2022-08-22 ASSESSMENT — ENCOUNTER SYMPTOMS
CHEST TIGHTNESS: 0
ABDOMINAL PAIN: 0
EYE DISCHARGE: 0
VOMITING: 0
WHEEZING: 0
DIARRHEA: 0
EYE REDNESS: 0
EYE PAIN: 0
ABDOMINAL DISTENTION: 0
FACIAL SWELLING: 0
BACK PAIN: 0
CONSTIPATION: 0
SORE THROAT: 0
CHOKING: 0
SHORTNESS OF BREATH: 0

## 2022-08-22 NOTE — PROGRESS NOTES
SUBJECTIVE:  Ms. Cain Victor is a 62 y.o. female who presents today for follow-up. Since her last visit patient has seen urology and also undergone CT scan. CT scan does not show any acute abnormality nor does it show ventral hernia. Patient was seen and evaluated by urology. Started on medications for urinary spasms. States this has improved her pain mildly. Pain is still occurring when she stands for long periods of time. Also referred by Urology to see Gyn. Patient's medications, allergies, past medical, surgical, social and family histories werereviewed and updated as appropriate. Review of Systems   Constitutional:  Negative for activity change, chills, fatigue and fever. HENT:  Negative for facial swelling, hearing loss and sore throat. Eyes:  Negative for pain, discharge and redness. Respiratory:  Negative for choking, chest tightness, shortness of breath and wheezing. Cardiovascular:  Negative for chest pain and palpitations. Gastrointestinal:  Negative for abdominal distention, abdominal pain, constipation, diarrhea and vomiting. Genitourinary:  Positive for frequency. Musculoskeletal:  Positive for arthralgias and myalgias. Negative for back pain, neck pain and neck stiffness. Neurological:  Negative for dizziness, speech difficulty, weakness and numbness. Psychiatric/Behavioral:  Negative for agitation, hallucinations and suicidal ideas. The patient is nervous/anxious. OBJECTIVE:  /70 (Site: Right Upper Arm, Position: Sitting, Cuff Size: Medium Adult)   Temp 98 °F (36.7 °C) (Temporal)   Ht 5' (1.524 m)   Wt 189 lb 6.4 oz (85.9 kg)   BMI 36.99 kg/m²   Physical Exam  Constitutional:       Appearance: Normal appearance. She is obese. HENT:      Head: Normocephalic and atraumatic. Right Ear: External ear normal.      Left Ear: External ear normal.      Nose: Nose normal.   Eyes:      Pupils: Pupils are equal, round, and reactive to light.    Pulmonary: Effort: Pulmonary effort is normal.      Breath sounds: Normal breath sounds. Abdominal:      General: Bowel sounds are normal.      Palpations: Abdomen is soft. Tenderness: no abdominal tenderness There is no guarding. Musculoskeletal:         General: Normal range of motion. Cervical back: Normal range of motion and neck supple. Skin:     General: Skin is warm and dry. Neurological:      General: No focal deficit present. Mental Status: She is alert and oriented to person, place, and time. Psychiatric:         Mood and Affect: Mood normal.         Behavior: Behavior normal.       ASSESSMENT:   Diagnosis Orders   1. Lower abdominal pain        2. Urgency of urination            PLAN:  No orders of the defined types were placed in this encounter. No orders of the defined types were placed in this encounter. I discussed at length with the patient the further options. She will continue her treatment by urology and follow-up with GYN. Scheduled to see pain management in September. She is discussed with them direct injections. If pain does not improve can consider diagnostic laparoscopy. I did discuss this at length with the patient and her . Return in about 3 months (around 11/22/2022).     Kervin Snell DO 8/22/2022 1:29 PM

## 2022-09-09 DIAGNOSIS — B36.9 FUNGAL DERMATITIS: ICD-10-CM

## 2022-09-12 RX ORDER — CLOTRIMAZOLE AND BETAMETHASONE DIPROPIONATE 10; .64 MG/G; MG/G
CREAM TOPICAL
Qty: 45 G | Refills: 5 | Status: SHIPPED | OUTPATIENT
Start: 2022-09-12

## 2022-09-12 NOTE — TELEPHONE ENCOUNTER
Debora Clay called to request a refill on her medication. Last office visit : 4/20/2022   Next office visit : Visit date not found     Requested Prescriptions     Pending Prescriptions Disp Refills    clotrimazole-betamethasone (LOTRISONE) 1-0.05 % cream 45 g 5     Sig: Apply topically 2 times daily.  Abdominal rash     Patient sees Dr Sudha Sandhu

## 2022-09-20 DIAGNOSIS — E11.9 TYPE 2 DIABETES MELLITUS WITHOUT COMPLICATION, WITHOUT LONG-TERM CURRENT USE OF INSULIN (HCC): Primary | ICD-10-CM

## 2022-09-21 ENCOUNTER — OFFICE VISIT (OUTPATIENT)
Dept: OBGYN CLINIC | Age: 57
End: 2022-09-21
Payer: COMMERCIAL

## 2022-09-21 VITALS
SYSTOLIC BLOOD PRESSURE: 112 MMHG | DIASTOLIC BLOOD PRESSURE: 66 MMHG | WEIGHT: 188 LBS | BODY MASS INDEX: 36.91 KG/M2 | HEART RATE: 79 BPM | HEIGHT: 60 IN

## 2022-09-21 DIAGNOSIS — Z01.419 ENCOUNTER FOR ROUTINE GYNECOLOGIC EXAMINATION IN MEDICARE PATIENT: ICD-10-CM

## 2022-09-21 DIAGNOSIS — Z12.4 SCREENING FOR CERVICAL CANCER: ICD-10-CM

## 2022-09-21 DIAGNOSIS — Z76.89 ENCOUNTER TO ESTABLISH CARE: Primary | ICD-10-CM

## 2022-09-21 DIAGNOSIS — E11.9 TYPE 2 DIABETES MELLITUS WITHOUT COMPLICATION, WITHOUT LONG-TERM CURRENT USE OF INSULIN (HCC): ICD-10-CM

## 2022-09-21 DIAGNOSIS — Z12.31 SCREENING MAMMOGRAM FOR BREAST CANCER: ICD-10-CM

## 2022-09-21 LAB
HCT VFR BLD CALC: 48.5 % (ref 37–47)
HEMOGLOBIN: 15 G/DL (ref 12–16)
MCH RBC QN AUTO: 30.4 PG (ref 27–31)
MCHC RBC AUTO-ENTMCNC: 30.9 G/DL (ref 33–37)
MCV RBC AUTO: 98.4 FL (ref 81–99)
PDW BLD-RTO: 12.8 % (ref 11.5–14.5)
PLATELET # BLD: 184 K/UL (ref 130–400)
PMV BLD AUTO: 10 FL (ref 9.4–12.3)
RBC # BLD: 4.93 M/UL (ref 4.2–5.4)
WBC # BLD: 7.6 K/UL (ref 4.8–10.8)

## 2022-09-21 PROCEDURE — 99202 OFFICE O/P NEW SF 15 MIN: CPT | Performed by: NURSE PRACTITIONER

## 2022-09-21 PROCEDURE — G0101 CA SCREEN;PELVIC/BREAST EXAM: HCPCS | Performed by: NURSE PRACTITIONER

## 2022-09-21 ASSESSMENT — ENCOUNTER SYMPTOMS
EYES NEGATIVE: 1
ALLERGIC/IMMUNOLOGIC NEGATIVE: 1
ABDOMINAL PAIN: 1
RESPIRATORY NEGATIVE: 1
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
## 2022-09-23 ENCOUNTER — HOSPITAL ENCOUNTER (OUTPATIENT)
Dept: GENERAL RADIOLOGY | Age: 57
Discharge: HOME | End: 2022-09-23
Payer: COMMERCIAL

## 2022-09-23 DIAGNOSIS — C90.30 PLASMACYTOMA OF BONE (HCC): ICD-10-CM

## 2022-09-23 PROCEDURE — 73060 X-RAY EXAM OF HUMERUS: CPT

## 2022-10-05 DIAGNOSIS — I10 PRIMARY HYPERTENSION: ICD-10-CM

## 2022-10-07 RX ORDER — LOSARTAN POTASSIUM 50 MG/1
TABLET ORAL
Qty: 30 TABLET | Refills: 0 | Status: SHIPPED | OUTPATIENT
Start: 2022-10-07 | End: 2022-11-04

## 2022-11-02 ENCOUNTER — OFFICE VISIT (OUTPATIENT)
Dept: UROLOGY | Age: 57
End: 2022-11-02
Payer: COMMERCIAL

## 2022-11-02 VITALS — BODY MASS INDEX: 36.99 KG/M2 | HEIGHT: 60 IN | TEMPERATURE: 97.3 F | WEIGHT: 188.4 LBS

## 2022-11-02 DIAGNOSIS — N39.46 MIXED INCONTINENCE URGE AND STRESS: ICD-10-CM

## 2022-11-02 DIAGNOSIS — R39.15 URGENCY OF URINATION: Primary | ICD-10-CM

## 2022-11-02 DIAGNOSIS — R35.0 FREQUENCY OF MICTURITION: ICD-10-CM

## 2022-11-02 LAB
APPEARANCE FLUID: CLEAR
BILIRUBIN, POC: NORMAL
BLOOD URINE, POC: NORMAL
CLARITY, POC: CLEAR
COLOR, POC: YELLOW
GLUCOSE URINE, POC: NORMAL
KETONES, POC: NORMAL
LEUKOCYTE EST, POC: NORMAL
NITRITE, POC: NORMAL
PH, POC: 6
PROTEIN, POC: NORMAL
SPECIFIC GRAVITY, POC: 1.02
UROBILINOGEN, POC: 1

## 2022-11-02 PROCEDURE — G8417 CALC BMI ABV UP PARAM F/U: HCPCS | Performed by: NURSE PRACTITIONER

## 2022-11-02 PROCEDURE — 81002 URINALYSIS NONAUTO W/O SCOPE: CPT | Performed by: NURSE PRACTITIONER

## 2022-11-02 PROCEDURE — G8427 DOCREV CUR MEDS BY ELIG CLIN: HCPCS | Performed by: NURSE PRACTITIONER

## 2022-11-02 PROCEDURE — 99214 OFFICE O/P EST MOD 30 MIN: CPT | Performed by: NURSE PRACTITIONER

## 2022-11-02 PROCEDURE — G8484 FLU IMMUNIZE NO ADMIN: HCPCS | Performed by: NURSE PRACTITIONER

## 2022-11-02 PROCEDURE — 4004F PT TOBACCO SCREEN RCVD TLK: CPT | Performed by: NURSE PRACTITIONER

## 2022-11-02 PROCEDURE — 51798 US URINE CAPACITY MEASURE: CPT | Performed by: NURSE PRACTITIONER

## 2022-11-02 PROCEDURE — 3017F COLORECTAL CA SCREEN DOC REV: CPT | Performed by: NURSE PRACTITIONER

## 2022-11-02 ASSESSMENT — ENCOUNTER SYMPTOMS
NAUSEA: 0
ABDOMINAL DISTENTION: 0
BACK PAIN: 0
ABDOMINAL PAIN: 0
VOMITING: 0

## 2022-11-02 NOTE — PROGRESS NOTES
José Miguel Squires is a 62 y.o., female, Established patient who presents today   Chief Complaint   Patient presents with    Follow-up     3 month [facility] follow up       HPI   Patient presents for follow-up of lower urinary tract symptoms. She complains of urgency, urge incontinence, stress incontinence, frequency, nocturia and bladder spasms. At her previous appointment, we initiated her on anticholinergic therapy, however, she was unable to tolerate the medication secondary to severe dry eyes, dry mouth, constipation. She reports the medication was effective, however, she could not tolerate the side effects. She discontinued the medication about 3 weeks ago. Her pelvic exam revealed no significant abnormalities. REVIEW OF SYSTEMS:  Review of Systems   Constitutional:  Negative for chills and fever. Gastrointestinal:  Negative for abdominal distention, abdominal pain, nausea and vomiting. Genitourinary:  Positive for frequency and urgency. Negative for difficulty urinating, dysuria, flank pain and hematuria. Musculoskeletal:  Negative for back pain and gait problem. Psychiatric/Behavioral:  Negative for agitation and confusion. PHYSICAL EXAM:  Temp 97.3 °F (36.3 °C) (Temporal)   Ht 5' (1.524 m)   Wt 188 lb 6.4 oz (85.5 kg)   BMI 36.79 kg/m²   Physical Exam  Vitals and nursing note reviewed. Constitutional:       General: She is not in acute distress. Appearance: Normal appearance. She is not ill-appearing. Pulmonary:      Effort: Pulmonary effort is normal. No respiratory distress. Abdominal:      General: There is no distension. Tenderness: There is no abdominal tenderness. There is no right CVA tenderness or left CVA tenderness. Neurological:      Mental Status: She is alert and oriented to person, place, and time. Mental status is at baseline.    Psychiatric:         Mood and Affect: Mood normal.         Behavior: Behavior normal.       DATA:  Results for orders placed or performed in visit on 11/02/22   POCT Urinalysis no Micro   Result Value Ref Range    Color, UA yellow     Clarity, UA clear     Glucose, UA POC neg     Bilirubin, UA neg     Ketones, UA neg     Spec Grav, UA 1.025     Blood, UA POC neg     pH, UA 6.0     Protein, UA POC 30mg     Urobilinogen, UA 1.0     Leukocytes, UA neg     Nitrite, UA neg     Appearance, Fluid Clear Clear, Slightly Cloudy     ASSESSMENT/PLAN  1. Urgency of urination  2. Mixed incontinence urge and stress  3. Frequency of micturition  Patient presents for follow-up of lower urinary tract symptoms after initiation of anticholinergic therapy. Unfortunately, patient was unable to tolerate the medication and discontinued it about 3 weeks ago. She did report having relief of symptoms, but again, was unable to tolerate side effects. We will initiate her on beta 3 agonist to see if this may also be effective for her. - DE Measure, post-void residual, US, non-imaging  - mirabegron (MYRBETRIQ) 50 MG TB24; Take 50 mg by mouth daily  Dispense: 30 tablet; Refill: 11  - POCT Urinalysis no Micro      Orders Placed This Encounter   Procedures    POCT Urinalysis no Micro    DE Measure, post-void residual, US, non-imaging        Return in about 3 months (around 2/2/2023). An electronic signature was used to authenticate this note. AURY SCHUMACHER - CNP    All information inputted into the note by the MA to include chief complaint, past medical history, past surgical history, medications, allergies, social and family history and review of systems has been reviewed and updated as needed by me. EMR Dragon/transcription disclaimer: Much of this document is electronic transcription/translation of spoken language to printed text. The electronic translation of spoken language may be erroneous or, at times, nonsensical words or phrases may be inadvertently transcribed.  Although I have reviewed the document for such errors, some may still exist.

## 2022-11-04 DIAGNOSIS — I10 PRIMARY HYPERTENSION: ICD-10-CM

## 2022-11-04 RX ORDER — LOSARTAN POTASSIUM 50 MG/1
TABLET ORAL
Qty: 30 TABLET | Refills: 0 | Status: SHIPPED | OUTPATIENT
Start: 2022-11-04 | End: 2022-11-21

## 2022-11-18 DIAGNOSIS — I10 PRIMARY HYPERTENSION: ICD-10-CM

## 2022-11-21 RX ORDER — LOSARTAN POTASSIUM 50 MG/1
50 TABLET ORAL DAILY
Qty: 30 TABLET | Refills: 5 | Status: SHIPPED | OUTPATIENT
Start: 2022-11-21

## 2022-12-02 DIAGNOSIS — E78.5 HYPERLIPIDEMIA, UNSPECIFIED HYPERLIPIDEMIA TYPE: ICD-10-CM

## 2022-12-02 RX ORDER — SIMVASTATIN 20 MG
TABLET ORAL
Qty: 90 TABLET | Refills: 1 | Status: SHIPPED | OUTPATIENT
Start: 2022-12-02

## 2022-12-20 LAB
ALBUMIN SERPL-MCNC: 4.4 G/DL (ref 3.5–5.2)
ALP BLD-CCNC: 80 U/L (ref 35–104)
ALT SERPL-CCNC: 30 U/L (ref 5–33)
ANION GAP SERPL CALCULATED.3IONS-SCNC: 17 MMOL/L (ref 7–19)
AST SERPL-CCNC: 19 U/L (ref 5–32)
BILIRUB SERPL-MCNC: 0.4 MG/DL (ref 0.2–1.2)
BUN BLDV-MCNC: 11 MG/DL (ref 6–20)
CALCIUM SERPL-MCNC: 9.6 MG/DL (ref 8.6–10)
CHLORIDE BLD-SCNC: 97 MMOL/L (ref 98–111)
CO2: 25 MMOL/L (ref 22–29)
CREAT SERPL-MCNC: 0.6 MG/DL (ref 0.5–0.9)
GFR SERPL CREATININE-BSD FRML MDRD: >60 ML/MIN/{1.73_M2}
GLUCOSE BLD-MCNC: 189 MG/DL (ref 74–109)
POTASSIUM SERPL-SCNC: 4.1 MMOL/L (ref 3.5–5)
SODIUM BLD-SCNC: 139 MMOL/L (ref 136–145)
TOTAL PROTEIN: 7.4 G/DL (ref 6.6–8.7)
VITAMIN D 25-HYDROXY: 39.3 NG/ML

## 2023-01-24 DIAGNOSIS — C90.30 SOLITARY PLASMACYTOMA OF BONE (HCC): Primary | ICD-10-CM
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
## 2023-02-02 ENCOUNTER — OFFICE VISIT (OUTPATIENT)
Dept: UROLOGY | Age: 58
End: 2023-02-02
Payer: COMMERCIAL

## 2023-02-02 VITALS
DIASTOLIC BLOOD PRESSURE: 66 MMHG | TEMPERATURE: 97.8 F | HEART RATE: 87 BPM | SYSTOLIC BLOOD PRESSURE: 138 MMHG | BODY MASS INDEX: 37.66 KG/M2 | WEIGHT: 191.8 LBS | HEIGHT: 60 IN | OXYGEN SATURATION: 95 %

## 2023-02-02 DIAGNOSIS — R39.15 URGENCY OF URINATION: Primary | ICD-10-CM

## 2023-02-02 DIAGNOSIS — R35.0 FREQUENCY OF MICTURITION: ICD-10-CM

## 2023-02-02 DIAGNOSIS — N39.46 MIXED INCONTINENCE URGE AND STRESS: ICD-10-CM

## 2023-02-02 LAB
APPEARANCE FLUID: CLEAR
BILIRUBIN, POC: NORMAL
BLOOD URINE, POC: NORMAL
CLARITY, POC: CLEAR
COLOR, POC: YELLOW
GLUCOSE URINE, POC: NORMAL
KETONES, POC: NORMAL
LEUKOCYTE EST, POC: NORMAL
NITRITE, POC: NORMAL
PH, POC: 5.5
PROTEIN, POC: NORMAL
SPECIFIC GRAVITY, POC: 1.02
UROBILINOGEN, POC: 1

## 2023-02-02 PROCEDURE — 99213 OFFICE O/P EST LOW 20 MIN: CPT | Performed by: NURSE PRACTITIONER

## 2023-02-02 PROCEDURE — 3017F COLORECTAL CA SCREEN DOC REV: CPT | Performed by: NURSE PRACTITIONER

## 2023-02-02 PROCEDURE — 4004F PT TOBACCO SCREEN RCVD TLK: CPT | Performed by: NURSE PRACTITIONER

## 2023-02-02 PROCEDURE — 3078F DIAST BP <80 MM HG: CPT | Performed by: NURSE PRACTITIONER

## 2023-02-02 PROCEDURE — 3075F SYST BP GE 130 - 139MM HG: CPT | Performed by: NURSE PRACTITIONER

## 2023-02-02 PROCEDURE — G8417 CALC BMI ABV UP PARAM F/U: HCPCS | Performed by: NURSE PRACTITIONER

## 2023-02-02 PROCEDURE — G8427 DOCREV CUR MEDS BY ELIG CLIN: HCPCS | Performed by: NURSE PRACTITIONER

## 2023-02-02 PROCEDURE — G8484 FLU IMMUNIZE NO ADMIN: HCPCS | Performed by: NURSE PRACTITIONER

## 2023-02-02 PROCEDURE — 81002 URINALYSIS NONAUTO W/O SCOPE: CPT | Performed by: NURSE PRACTITIONER

## 2023-02-02 ASSESSMENT — ENCOUNTER SYMPTOMS
BACK PAIN: 0
VOMITING: 0
ABDOMINAL PAIN: 0
ABDOMINAL DISTENTION: 0
NAUSEA: 0

## 2023-02-02 NOTE — PROGRESS NOTES
Hezekiah Harada is a 62 y.o., female, Established patient who presents today   Chief Complaint   Patient presents with    Follow-up     Patient following up for urgency. HPI   Patient presents for follow-up of lower urinary tract symptoms. She complains of urgency, urge incontinence, stress incontinence, frequency, nocturia, bladder spasms. She has previously failed anticholinergic therapy secondary to severe dry eyes, dry mouth, constipation. The medication was effective, however, she could not tolerate side effects. We recently initiated therapy with beta 3 agonist.  Patient reports she was only on medication for about a month until she ran out of samples and was unable to afford the medication. She does report some relief of symptoms with her short trial.    REVIEW OF SYSTEMS:  Review of Systems   Constitutional:  Negative for chills and fever. Gastrointestinal:  Negative for abdominal distention, abdominal pain, nausea and vomiting. Genitourinary:  Negative for difficulty urinating, dysuria, flank pain, frequency, hematuria and urgency. Musculoskeletal:  Negative for back pain and gait problem. Psychiatric/Behavioral:  Negative for agitation and confusion. PHYSICAL EXAM:  /66   Pulse 87   Temp 97.8 °F (36.6 °C)   Ht 5' (1.524 m)   Wt 191 lb 12.8 oz (87 kg)   SpO2 95%   BMI 37.46 kg/m²   Physical Exam  Vitals and nursing note reviewed. Constitutional:       General: She is not in acute distress. Appearance: Normal appearance. She is not ill-appearing. Pulmonary:      Effort: Pulmonary effort is normal. No respiratory distress. Abdominal:      General: There is no distension. Tenderness: There is no abdominal tenderness. There is no right CVA tenderness or left CVA tenderness. Neurological:      Mental Status: She is alert and oriented to person, place, and time. Mental status is at baseline.    Psychiatric:         Mood and Affect: Mood normal. Behavior: Behavior normal.       DATA:  Results for orders placed or performed in visit on 02/02/23   POCT Urinalysis no Micro   Result Value Ref Range    Color, UA yellow     Clarity, UA Clear     Glucose, UA POC Neg     Bilirubin, UA Small     Ketones, UA Trace     Spec Grav, UA 1.025     Blood, UA POC Neg     pH, UA 5.5     Protein, UA POC 30 mg/dL     Urobilinogen, UA 1.0     Leukocytes, UA Neg     Nitrite, UA Neg     Appearance, Fluid Clear Clear, Slightly Cloudy     ASSESSMENT/PLAN  1. Urgency of urination  2. Mixed incontinence urge and stress  3. Frequency of micturition  Patient presents for follow-up of lower urinary tract symptoms. Unfortunately, she did not complete her trial of Myrbetriq. I will provide her with 12 weeks of samples today and will follow up in 3 months for symptom check. - mirabegron (MYRBETRIQ) 50 MG TB24; Take 50 mg by mouth daily  Dispense: 90 tablet; Refill: 3    Orders Placed This Encounter   Procedures    POCT Urinalysis no Micro          Return in about 3 months (around 5/2/2023). An electronic signature was used to authenticate this note. AURY SCHUMACHER - CNP    All information inputted into the note by the MA to include chief complaint, past medical history, past surgical history, medications, allergies, social and family history and review of systems has been reviewed and updated as needed by me. EMR Dragon/transcription disclaimer: Much of this document is electronic transcription/translation of spoken language to printed text. The electronic translation of spoken language may be erroneous or, at times, nonsensical words or phrases may be inadvertently transcribed.  Although I have reviewed the document for such errors, some may still exist.

## 2023-02-10 ENCOUNTER — HOSPITAL ENCOUNTER (OUTPATIENT)
Dept: MRI IMAGING | Age: 58
Discharge: HOME | End: 2023-02-10
Payer: MEDICARE

## 2023-02-10 DIAGNOSIS — C90.30 SOLITARY PLASMACYTOMA OF BONE (HCC): ICD-10-CM

## 2023-02-10 PROCEDURE — 6360000004 HC RX CONTRAST MEDICATION: Performed by: NURSE PRACTITIONER

## 2023-02-10 PROCEDURE — 72157 MRI CHEST SPINE W/O & W/DYE: CPT

## 2023-02-10 PROCEDURE — A9577 INJ MULTIHANCE: HCPCS | Performed by: NURSE PRACTITIONER

## 2023-02-10 PROCEDURE — 72158 MRI LUMBAR SPINE W/O & W/DYE: CPT

## 2023-02-10 RX ADMIN — GADOBENATE DIMEGLUMINE 17 ML: 529 INJECTION, SOLUTION INTRAVENOUS at 13:47

## 2023-02-10 SDOH — ECONOMIC STABILITY: INCOME INSECURITY: HOW HARD IS IT FOR YOU TO PAY FOR THE VERY BASICS LIKE FOOD, HOUSING, MEDICAL CARE, AND HEATING?: SOMEWHAT HARD

## 2023-02-10 SDOH — HEALTH STABILITY: PHYSICAL HEALTH: ON AVERAGE, HOW MANY DAYS PER WEEK DO YOU ENGAGE IN MODERATE TO STRENUOUS EXERCISE (LIKE A BRISK WALK)?: 3 DAYS

## 2023-02-10 SDOH — ECONOMIC STABILITY: FOOD INSECURITY: WITHIN THE PAST 12 MONTHS, THE FOOD YOU BOUGHT JUST DIDN'T LAST AND YOU DIDN'T HAVE MONEY TO GET MORE.: OFTEN TRUE

## 2023-02-10 SDOH — ECONOMIC STABILITY: TRANSPORTATION INSECURITY
IN THE PAST 12 MONTHS, HAS LACK OF TRANSPORTATION KEPT YOU FROM MEETINGS, WORK, OR FROM GETTING THINGS NEEDED FOR DAILY LIVING?: PATIENT DECLINED

## 2023-02-10 SDOH — HEALTH STABILITY: PHYSICAL HEALTH: ON AVERAGE, HOW MANY MINUTES DO YOU ENGAGE IN EXERCISE AT THIS LEVEL?: 30 MIN

## 2023-02-10 SDOH — ECONOMIC STABILITY: FOOD INSECURITY: WITHIN THE PAST 12 MONTHS, YOU WORRIED THAT YOUR FOOD WOULD RUN OUT BEFORE YOU GOT MONEY TO BUY MORE.: SOMETIMES TRUE

## 2023-02-10 SDOH — ECONOMIC STABILITY: HOUSING INSECURITY
IN THE LAST 12 MONTHS, WAS THERE A TIME WHEN YOU DID NOT HAVE A STEADY PLACE TO SLEEP OR SLEPT IN A SHELTER (INCLUDING NOW)?: NO

## 2023-02-10 ASSESSMENT — PATIENT HEALTH QUESTIONNAIRE - PHQ9
SUM OF ALL RESPONSES TO PHQ QUESTIONS 1-9: 15
2. FEELING DOWN, DEPRESSED OR HOPELESS: 1
4. FEELING TIRED OR HAVING LITTLE ENERGY: 3
8. MOVING OR SPEAKING SO SLOWLY THAT OTHER PEOPLE COULD HAVE NOTICED. OR THE OPPOSITE, BEING SO FIGETY OR RESTLESS THAT YOU HAVE BEEN MOVING AROUND A LOT MORE THAN USUAL: 1
6. FEELING BAD ABOUT YOURSELF - OR THAT YOU ARE A FAILURE OR HAVE LET YOURSELF OR YOUR FAMILY DOWN: 2
SUM OF ALL RESPONSES TO PHQ9 QUESTIONS 1 & 2: 2
SUM OF ALL RESPONSES TO PHQ QUESTIONS 1-9: 15
7. TROUBLE CONCENTRATING ON THINGS, SUCH AS READING THE NEWSPAPER OR WATCHING TELEVISION: 2
5. POOR APPETITE OR OVEREATING: 2
SUM OF ALL RESPONSES TO PHQ QUESTIONS 1-9: 15
10. IF YOU CHECKED OFF ANY PROBLEMS, HOW DIFFICULT HAVE THESE PROBLEMS MADE IT FOR YOU TO DO YOUR WORK, TAKE CARE OF THINGS AT HOME, OR GET ALONG WITH OTHER PEOPLE: 0
1. LITTLE INTEREST OR PLEASURE IN DOING THINGS: 1
3. TROUBLE FALLING OR STAYING ASLEEP: 3
9. THOUGHTS THAT YOU WOULD BE BETTER OFF DEAD, OR OF HURTING YOURSELF: 0
SUM OF ALL RESPONSES TO PHQ QUESTIONS 1-9: 15

## 2023-02-10 ASSESSMENT — COLUMBIA-SUICIDE SEVERITY RATING SCALE - C-SSRS
1. WITHIN THE PAST MONTH, HAVE YOU WISHED YOU WERE DEAD OR WISHED YOU COULD GO TO SLEEP AND NOT WAKE UP?: NO
2. HAVE YOU ACTUALLY HAD ANY THOUGHTS OF KILLING YOURSELF?: NO
6. HAVE YOU EVER DONE ANYTHING, STARTED TO DO ANYTHING, OR PREPARED TO DO ANYTHING TO END YOUR LIFE?: NO

## 2023-02-10 ASSESSMENT — LIFESTYLE VARIABLES
HOW OFTEN DO YOU HAVE A DRINK CONTAINING ALCOHOL: NEVER
HOW MANY STANDARD DRINKS CONTAINING ALCOHOL DO YOU HAVE ON A TYPICAL DAY: 0
HOW OFTEN DO YOU HAVE A DRINK CONTAINING ALCOHOL: 1
HOW MANY STANDARD DRINKS CONTAINING ALCOHOL DO YOU HAVE ON A TYPICAL DAY: PATIENT DOES NOT DRINK
HOW OFTEN DO YOU HAVE SIX OR MORE DRINKS ON ONE OCCASION: 1

## 2023-02-13 ENCOUNTER — OFFICE VISIT (OUTPATIENT)
Dept: FAMILY MEDICINE CLINIC | Age: 58
End: 2023-02-13
Payer: COMMERCIAL

## 2023-02-13 VITALS
WEIGHT: 195.6 LBS | HEART RATE: 84 BPM | RESPIRATION RATE: 20 BRPM | DIASTOLIC BLOOD PRESSURE: 78 MMHG | BODY MASS INDEX: 38.4 KG/M2 | SYSTOLIC BLOOD PRESSURE: 138 MMHG | OXYGEN SATURATION: 98 % | HEIGHT: 60 IN | TEMPERATURE: 98 F

## 2023-02-13 DIAGNOSIS — Z00.00 MEDICARE ANNUAL WELLNESS VISIT, SUBSEQUENT: Primary | ICD-10-CM

## 2023-02-13 PROBLEM — F32.9 REACTIVE DEPRESSION: Status: ACTIVE | Noted: 2020-04-29

## 2023-02-13 PROBLEM — F17.200 CURRENT EVERY DAY SMOKER: Status: ACTIVE | Noted: 2019-08-04

## 2023-02-13 PROBLEM — S22.000A COMPRESSION FRACTURE OF BODY OF THORACIC VERTEBRA (HCC): Status: ACTIVE | Noted: 2020-12-17

## 2023-02-13 PROBLEM — M19.171 POST-TRAUMATIC ARTHRITIS OF ANKLE, RIGHT: Status: ACTIVE | Noted: 2017-12-19

## 2023-02-13 PROBLEM — F41.9 ANXIETY DISORDER: Status: ACTIVE | Noted: 2021-03-26

## 2023-02-13 PROBLEM — N39.42 URINARY INCONTINENCE WITHOUT SENSORY AWARENESS: Status: ACTIVE | Noted: 2019-08-05

## 2023-02-13 PROBLEM — M81.0 OSTEOPOROSIS: Status: ACTIVE | Noted: 2021-01-08

## 2023-02-13 PROBLEM — G89.3 NEOPLASM RELATED PAIN (ACUTE) (CHRONIC): Status: ACTIVE | Noted: 2019-09-17

## 2023-02-13 PROCEDURE — 3017F COLORECTAL CA SCREEN DOC REV: CPT | Performed by: FAMILY MEDICINE

## 2023-02-13 PROCEDURE — G0439 PPPS, SUBSEQ VISIT: HCPCS | Performed by: FAMILY MEDICINE

## 2023-02-13 PROCEDURE — G8482 FLU IMMUNIZE ORDER/ADMIN: HCPCS | Performed by: FAMILY MEDICINE

## 2023-02-13 PROCEDURE — 3075F SYST BP GE 130 - 139MM HG: CPT | Performed by: FAMILY MEDICINE

## 2023-02-13 PROCEDURE — 3078F DIAST BP <80 MM HG: CPT | Performed by: FAMILY MEDICINE

## 2023-02-13 RX ORDER — MEMANTINE HYDROCHLORIDE 5 MG/1
TABLET ORAL
COMMUNITY
Start: 2023-01-27 | End: 2023-02-13

## 2023-02-13 RX ORDER — ZOLEDRONIC ACID 5 MG/100ML
5 INJECTION, SOLUTION INTRAVENOUS ONCE
COMMUNITY

## 2023-02-13 RX ORDER — BUSPIRONE HYDROCHLORIDE 15 MG/1
TABLET ORAL
COMMUNITY
Start: 2023-01-20

## 2023-02-13 RX ORDER — PHENOL 1.4 %
1 AEROSOL, SPRAY (ML) MUCOUS MEMBRANE DAILY
COMMUNITY

## 2023-02-13 SDOH — ECONOMIC STABILITY: INCOME INSECURITY: HOW HARD IS IT FOR YOU TO PAY FOR THE VERY BASICS LIKE FOOD, HOUSING, MEDICAL CARE, AND HEATING?: SOMEWHAT HARD

## 2023-02-13 SDOH — ECONOMIC STABILITY: FOOD INSECURITY: WITHIN THE PAST 12 MONTHS, THE FOOD YOU BOUGHT JUST DIDN'T LAST AND YOU DIDN'T HAVE MONEY TO GET MORE.: OFTEN TRUE

## 2023-02-13 SDOH — ECONOMIC STABILITY: FOOD INSECURITY: WITHIN THE PAST 12 MONTHS, YOU WORRIED THAT YOUR FOOD WOULD RUN OUT BEFORE YOU GOT MONEY TO BUY MORE.: OFTEN TRUE

## 2023-02-13 NOTE — PATIENT INSTRUCTIONS
Personalized Preventive Plan for Chrissy Juárez - 2/13/2023  Medicare offers a range of preventive health benefits. Some of the tests and screenings are paid in full while other may be subject to a deductible, co-insurance, and/or copay. Some of these benefits include a comprehensive review of your medical history including lifestyle, illnesses that may run in your family, and various assessments and screenings as appropriate. After reviewing your medical record and screening and assessments performed today your provider may have ordered immunizations, labs, imaging, and/or referrals for you. A list of these orders (if applicable) as well as your Preventive Care list are included within your After Visit Summary for your review. Other Preventive Recommendations:    A preventive eye exam performed by an eye specialist is recommended every 1-2 years to screen for glaucoma; cataracts, macular degeneration, and other eye disorders. A preventive dental visit is recommended every 6 months. Try to get at least 150 minutes of exercise per week or 10,000 steps per day on a pedometer . Order or download the FREE \"Exercise & Physical Activity: Your Everyday Guide\" from [employer]. Call 9-371.237.3996 or search [employer] online. You need 8754-2176 mg of calcium and 5574-7205 IU of vitamin D per day. It is possible to meet your calcium requirement with diet alone, but a vitamin D supplement is usually necessary to meet this goal.  When exposed to the sun, use a sunscreen that protects against both UVA and UVB radiation with an SPF of 30 or greater. Reapply every 2 to 3 hours or after sweating, drying off with a towel, or swimming. Always wear a seat belt when traveling in a car. Always wear a helmet when riding a bicycle or motorcycle. Heart-Healthy Diet   Sodium, Fat, and Cholesterol Controlled Diet       What Is a Heart Healthy Diet?    A heart-healthy diet is one that limits sodium , certain types of fat , and cholesterol . This type of diet is recommended for:   People with any form of cardiovascular disease (eg, coronary heart disease , peripheral vascular disease , previous heart attack , previous stroke )   People with risk factors for cardiovascular disease, such as high blood pressure , high cholesterol , or diabetes   Anyone who wants to lower their risk of developing cardiovascular disease   Sodium    Sodium is a mineral found in many foods. In general, most people consume much more sodium than they need. Diets high in sodium can increase blood pressure and lead to edema (water retention). On a heart-healthy diet, you should consume no more than 2,300 mg (milligrams) of sodium per dayabout the amount in one teaspoon of table salt. The foods highest in sodium include table salt (about 50% sodium), processed foods, convenience foods, and preserved foods. Cholesterol    Cholesterol is a fat-like, waxy substance in your blood. Our bodies make some cholesterol. It is also found in animal products, with the highest amounts in fatty meat, egg yolks, whole milk, cheese, shellfish, and organ meats. On a heart-healthy diet, you should limit your cholesterol intake to less than 200 mg per day. It is normal and important to have some cholesterol in your bloodstream. But too much cholesterol can cause plaque to build up within your arteries, which can eventually lead to a heart attack or stroke. The two types of cholesterol that are most commonly referred to are:   Low-density lipoprotein (LDL) cholesterol  Also known as bad cholesterol, this is the cholesterol that tends to build up along your arteries. Bad cholesterol levels are increased by eating fats that are saturated or hydrogenated. Optimal level of this cholesterol is less than 100. Over 130 starts to get risky for heart disease.    High-density lipoprotein (HDL) cholesterol  Also known as good cholesterol, this type of cholesterol actually carries cholesterol away from your arteries and may, therefore, help lower your risk of having a heart attack. You want this level to be high (ideally greater than 60). It is a risk to have a level less than 40. You can raise this good cholesterol by eating olive oil, canola oil, avocados, or nuts. Exercise raises this level, too. Fat    Fat is calorie dense and packs a lot of calories into a small amount of food. Even though fats should be limited due to their high calorie content, not all fats are bad. In fact, some fats are quite healthful. Fat can be broken down into four main types. The good-for-you fats are:   Monounsaturated fat  found in oils such as olive and canola, avocados, and nuts and natural nut butters; can decrease cholesterol levels, while keeping levels of HDL cholesterol high   Polyunsaturated fat  found in oils such as safflower, sunflower, soybean, corn, and sesame; can decrease total cholesterol and LDL cholesterol   Omega-3 fatty acids  particularly those found in fatty fish (such as salmon, trout, tuna, mackerel, herring, and sardines); can decrease risk of arrhythmias, decrease triglyceride levels, and slightly lower blood pressure   The fats that you want to limit are:   Saturated fat  found in animal products, many fast foods, and a few vegetables; increases total blood cholesterol, including LDL levels   Animal fats that are saturated include: butter, lard, whole-milk dairy products, meat fat, and poultry skin   Vegetable fats that are saturated include: hydrogenated shortening, palm oil, coconut oil, cocoa butter   Hydrogenated or trans fat  found in margarine and vegetable shortening, most shelf stable snack foods, and fried foods; increases LDL and decreases HDL     It is generally recommended that you limit your total fat for the day to less than 30% of your total calories.  If you follow an 1800-calorie heart healthy diet, for example, this would mean 60 grams of fat or less per day. Saturated fat and trans fat in your diet raises your blood cholesterol the most, much more than dietary cholesterol does. For this reason, on a heart-healthy diet, less than 7% of your calories should come from saturated fat and ideally 0% from trans fat. On an 1800-calorie diet, this translates into less than 14 grams of saturated fat per day, leaving 46 grams of fat to come from mono- and polyunsaturated fats.    Food Choices on a Heart Healthy Diet   Food Category   Foods Recommended   Foods to Avoid   Grains   Breads and rolls without salted tops Most dry and cooked cereals Unsalted crackers and breadsticks Low-sodium or homemade breadcrumbs or stuffing All rice and pastas   Breads, rolls, and crackers with salted tops High-fat baked goods (eg, muffins, donuts, pastries) Quick breads, self-rising flour, and biscuit mixes Regular bread crumbs Instant hot cereals Commercially prepared rice, pasta, or stuffing mixes   Vegetables   Most fresh, frozen, and low-sodium canned vegetables Low-sodium and salt-free vegetable juices Canned vegetables if unsalted or rinsed   Regular canned vegetables and juices, including sauerkraut and pickled vegetables Frozen vegetables with sauces Commercially prepared potato and vegetable mixes   Fruits   Most fresh, frozen, and canned fruits All fruit juices   Fruits processed with salt or sodium   Milk   Nonfat or low-fat (1%) milk Nonfat or low-fat yogurt Cottage cheese, low-fat ricotta, cheeses labeled as low-fat and low-sodium   Whole milk Reduced-fat (2%) milk Malted and chocolate milk Full fat yogurt Most cheeses (unless low-fat and low salt) Buttermilk (no more than 1 cup per week)   Meats and Beans   Lean cuts of fresh or frozen beef, veal, lamb, or pork (look for the word loin) Fresh or frozen poultry without the skin Fresh or frozen fish and some shellfish Egg whites and egg substitutes (Limit whole eggs to three per week) Tofu Nuts or seeds (unsalted, dry-roasted), low-sodium peanut butter Dried peas, beans, and lentils   Any smoked, cured, salted, or canned meat, fish, or poultry (including luong, chipped beef, cold cuts, hot dogs, sausages, sardines, and anchovies) Poultry skins Breaded and/or fried fish or meats Canned peas, beans, and lentils Salted nuts   Fats and Oils   Olive oil and canola oil Low-sodium, low-fat salad dressings and mayonnaise   Butter, margarine, coconut and palm oils, luong fat   Snacks, Sweets, and Condiments   Low-sodium or unsalted versions of broths, soups, soy sauce, and condiments Pepper, herbs, and spices; vinegar, lemon, or lime juice Low-fat frozen desserts (yogurt, sherbet, fruit bars) Sugar, cocoa powder, honey, syrup, jam, and preserves Low-fat, trans-fat free cookies, cakes, and pies Lex and animal crackers, fig bars, leonard snaps   High-fat desserts Broth, soups, gravies, and sauces, made from instant mixes or other high-sodium ingredients Salted snack foods Canned olives Meat tenderizers, seasoning salt, and most flavored vinegars   Beverages   Low-sodium carbonated beverages Tea and coffee in moderation Soy milk   Commercially softened water   Suggestions   Make whole grains, fruits, and vegetables the base of your diet. Choose heart-healthy fats such as canola, olive, and flaxseed oil, and foods high in heart-healthy fats, such as nuts, seeds, soybeans, tofu, and fish. Eat fish at least twice per week; the fish highest in omega-3 fatty acids and lowest in mercury include salmon, herring, mackerel, sardines, and canned chunk light tuna. If you eat fish less than twice per week or have high triglycerides, talk to your doctor about taking fish oil supplements. Read food labels. For products low in fat and cholesterol, look for fat free, low-fat, cholesterol free, saturated fat free, and trans fat freeAlso scan the Nutrition Facts Label, which lists saturated fat, trans fat, and cholesterol amounts. For products low in sodium, look for sodium free, very low sodium, low sodium, no added salt, and unsalted   Skip the salt when cooking or at the table; if food needs more flavor, get creative and try out different herbs and spices. Garlic and onion also add substantial flavor to foods. Trim any visible fat off meat and poultry before cooking, and drain the fat off after peters. Use cooking methods that require little or no added fat, such as grilling, boiling, baking, poaching, broiling, roasting, steaming, stir-frying, and sauting. Avoid fast food and convenience food. They tend to be high in saturated and trans fat and have a lot of added salt. Talk to a registered dietitian for individualized diet advice. Last Reviewed: March 2011 Shandra Ray MS, MPH, RD   Updated: 3/29/2011     Heart-Healthy Diet   Sodium, Fat, and Cholesterol Controlled Diet       What Is a Heart Healthy Diet? A heart-healthy diet is one that limits sodium , certain types of fat , and cholesterol . This type of diet is recommended for:   People with any form of cardiovascular disease (eg, coronary heart disease , peripheral vascular disease , previous heart attack , previous stroke )   People with risk factors for cardiovascular disease, such as high blood pressure , high cholesterol , or diabetes   Anyone who wants to lower their risk of developing cardiovascular disease   Sodium    Sodium is a mineral found in many foods. In general, most people consume much more sodium than they need. Diets high in sodium can increase blood pressure and lead to edema (water retention). On a heart-healthy diet, you should consume no more than 2,300 mg (milligrams) of sodium per dayabout the amount in one teaspoon of table salt. The foods highest in sodium include table salt (about 50% sodium), processed foods, convenience foods, and preserved foods. Cholesterol    Cholesterol is a fat-like, waxy substance in your blood.  Our bodies make some cholesterol. It is also found in animal products, with the highest amounts in fatty meat, egg yolks, whole milk, cheese, shellfish, and organ meats. On a heart-healthy diet, you should limit your cholesterol intake to less than 200 mg per day. It is normal and important to have some cholesterol in your bloodstream. But too much cholesterol can cause plaque to build up within your arteries, which can eventually lead to a heart attack or stroke. The two types of cholesterol that are most commonly referred to are:   Low-density lipoprotein (LDL) cholesterol  Also known as bad cholesterol, this is the cholesterol that tends to build up along your arteries. Bad cholesterol levels are increased by eating fats that are saturated or hydrogenated. Optimal level of this cholesterol is less than 100. Over 130 starts to get risky for heart disease. High-density lipoprotein (HDL) cholesterol  Also known as good cholesterol, this type of cholesterol actually carries cholesterol away from your arteries and may, therefore, help lower your risk of having a heart attack. You want this level to be high (ideally greater than 60). It is a risk to have a level less than 40. You can raise this good cholesterol by eating olive oil, canola oil, avocados, or nuts. Exercise raises this level, too. Fat    Fat is calorie dense and packs a lot of calories into a small amount of food. Even though fats should be limited due to their high calorie content, not all fats are bad. In fact, some fats are quite healthful. Fat can be broken down into four main types.    The good-for-you fats are:   Monounsaturated fat  found in oils such as olive and canola, avocados, and nuts and natural nut butters; can decrease cholesterol levels, while keeping levels of HDL cholesterol high   Polyunsaturated fat  found in oils such as safflower, sunflower, soybean, corn, and sesame; can decrease total cholesterol and LDL cholesterol   Omega-3 fatty acids  particularly those found in fatty fish (such as salmon, trout, tuna, mackerel, herring, and sardines); can decrease risk of arrhythmias, decrease triglyceride levels, and slightly lower blood pressure   The fats that you want to limit are:   Saturated fat  found in animal products, many fast foods, and a few vegetables; increases total blood cholesterol, including LDL levels   Animal fats that are saturated include: butter, lard, whole-milk dairy products, meat fat, and poultry skin   Vegetable fats that are saturated include: hydrogenated shortening, palm oil, coconut oil, cocoa butter   Hydrogenated or trans fat  found in margarine and vegetable shortening, most shelf stable snack foods, and fried foods; increases LDL and decreases HDL     It is generally recommended that you limit your total fat for the day to less than 30% of your total calories. If you follow an 1800-calorie heart healthy diet, for example, this would mean 60 grams of fat or less per day. Saturated fat and trans fat in your diet raises your blood cholesterol the most, much more than dietary cholesterol does. For this reason, on a heart-healthy diet, less than 7% of your calories should come from saturated fat and ideally 0% from trans fat. On an 1800-calorie diet, this translates into less than 14 grams of saturated fat per day, leaving 46 grams of fat to come from mono- and polyunsaturated fats.    Food Choices on a Heart Healthy Diet   Food Category   Foods Recommended   Foods to Avoid   Grains   Breads and rolls without salted tops Most dry and cooked cereals Unsalted crackers and breadsticks Low-sodium or homemade breadcrumbs or stuffing All rice and pastas   Breads, rolls, and crackers with salted tops High-fat baked goods (eg, muffins, donuts, pastries) Quick breads, self-rising flour, and biscuit mixes Regular bread crumbs Instant hot cereals Commercially prepared rice, pasta, or stuffing mixes   Vegetables   Most fresh, frozen, and low-sodium canned vegetables Low-sodium and salt-free vegetable juices Canned vegetables if unsalted or rinsed   Regular canned vegetables and juices, including sauerkraut and pickled vegetables Frozen vegetables with sauces Commercially prepared potato and vegetable mixes   Fruits   Most fresh, frozen, and canned fruits All fruit juices   Fruits processed with salt or sodium   Milk   Nonfat or low-fat (1%) milk Nonfat or low-fat yogurt Cottage cheese, low-fat ricotta, cheeses labeled as low-fat and low-sodium   Whole milk Reduced-fat (2%) milk Malted and chocolate milk Full fat yogurt Most cheeses (unless low-fat and low salt) Buttermilk (no more than 1 cup per week)   Meats and Beans   Lean cuts of fresh or frozen beef, veal, lamb, or pork (look for the word loin) Fresh or frozen poultry without the skin Fresh or frozen fish and some shellfish Egg whites and egg substitutes (Limit whole eggs to three per week) Tofu Nuts or seeds (unsalted, dry-roasted), low-sodium peanut butter Dried peas, beans, and lentils   Any smoked, cured, salted, or canned meat, fish, or poultry (including luong, chipped beef, cold cuts, hot dogs, sausages, sardines, and anchovies) Poultry skins Breaded and/or fried fish or meats Canned peas, beans, and lentils Salted nuts   Fats and Oils   Olive oil and canola oil Low-sodium, low-fat salad dressings and mayonnaise   Butter, margarine, coconut and palm oils, luong fat   Snacks, Sweets, and Condiments   Low-sodium or unsalted versions of broths, soups, soy sauce, and condiments Pepper, herbs, and spices; vinegar, lemon, or lime juice Low-fat frozen desserts (yogurt, sherbet, fruit bars) Sugar, cocoa powder, honey, syrup, jam, and preserves Low-fat, trans-fat free cookies, cakes, and pies Lex and animal crackers, fig bars, leonard snaps   High-fat desserts Broth, soups, gravies, and sauces, made from instant mixes or other high-sodium ingredients Salted snack foods Canned olives Meat tenderizers, seasoning salt, and most flavored vinegars   Beverages   Low-sodium carbonated beverages Tea and coffee in moderation Soy milk   Commercially softened water   Suggestions   Make whole grains, fruits, and vegetables the base of your diet. Choose heart-healthy fats such as canola, olive, and flaxseed oil, and foods high in heart-healthy fats, such as nuts, seeds, soybeans, tofu, and fish. Eat fish at least twice per week; the fish highest in omega-3 fatty acids and lowest in mercury include salmon, herring, mackerel, sardines, and canned chunk light tuna. If you eat fish less than twice per week or have high triglycerides, talk to your doctor about taking fish oil supplements. Read food labels. For products low in fat and cholesterol, look for fat free, low-fat, cholesterol free, saturated fat free, and trans fat freeAlso scan the Nutrition Facts Label, which lists saturated fat, trans fat, and cholesterol amounts. For products low in sodium, look for sodium free, very low sodium, low sodium, no added salt, and unsalted   Skip the salt when cooking or at the table; if food needs more flavor, get creative and try out different herbs and spices. Garlic and onion also add substantial flavor to foods. Trim any visible fat off meat and poultry before cooking, and drain the fat off after peters. Use cooking methods that require little or no added fat, such as grilling, boiling, baking, poaching, broiling, roasting, steaming, stir-frying, and sauting. Avoid fast food and convenience food. They tend to be high in saturated and trans fat and have a lot of added salt. Talk to a registered dietitian for individualized diet advice. Last Reviewed: March 2011 Hayden Andrade MS, MPH, RD   Updated: 3/29/2011     Heart-Healthy Diet   Sodium, Fat, and Cholesterol Controlled Diet       What Is a Heart Healthy Diet?    A heart-healthy diet is one that limits sodium , certain types of fat , and cholesterol . This type of diet is recommended for:   People with any form of cardiovascular disease (eg, coronary heart disease , peripheral vascular disease , previous heart attack , previous stroke )   People with risk factors for cardiovascular disease, such as high blood pressure , high cholesterol , or diabetes   Anyone who wants to lower their risk of developing cardiovascular disease   Sodium    Sodium is a mineral found in many foods. In general, most people consume much more sodium than they need. Diets high in sodium can increase blood pressure and lead to edema (water retention). On a heart-healthy diet, you should consume no more than 2,300 mg (milligrams) of sodium per dayabout the amount in one teaspoon of table salt. The foods highest in sodium include table salt (about 50% sodium), processed foods, convenience foods, and preserved foods. Cholesterol    Cholesterol is a fat-like, waxy substance in your blood. Our bodies make some cholesterol. It is also found in animal products, with the highest amounts in fatty meat, egg yolks, whole milk, cheese, shellfish, and organ meats. On a heart-healthy diet, you should limit your cholesterol intake to less than 200 mg per day. It is normal and important to have some cholesterol in your bloodstream. But too much cholesterol can cause plaque to build up within your arteries, which can eventually lead to a heart attack or stroke. The two types of cholesterol that are most commonly referred to are:   Low-density lipoprotein (LDL) cholesterol  Also known as bad cholesterol, this is the cholesterol that tends to build up along your arteries. Bad cholesterol levels are increased by eating fats that are saturated or hydrogenated. Optimal level of this cholesterol is less than 100. Over 130 starts to get risky for heart disease.    High-density lipoprotein (HDL) cholesterol  Also known as good cholesterol, this type of cholesterol actually carries cholesterol away from your arteries and may, therefore, help lower your risk of having a heart attack. You want this level to be high (ideally greater than 60). It is a risk to have a level less than 40. You can raise this good cholesterol by eating olive oil, canola oil, avocados, or nuts. Exercise raises this level, too. Fat    Fat is calorie dense and packs a lot of calories into a small amount of food. Even though fats should be limited due to their high calorie content, not all fats are bad. In fact, some fats are quite healthful. Fat can be broken down into four main types. The good-for-you fats are:   Monounsaturated fat  found in oils such as olive and canola, avocados, and nuts and natural nut butters; can decrease cholesterol levels, while keeping levels of HDL cholesterol high   Polyunsaturated fat  found in oils such as safflower, sunflower, soybean, corn, and sesame; can decrease total cholesterol and LDL cholesterol   Omega-3 fatty acids  particularly those found in fatty fish (such as salmon, trout, tuna, mackerel, herring, and sardines); can decrease risk of arrhythmias, decrease triglyceride levels, and slightly lower blood pressure   The fats that you want to limit are:   Saturated fat  found in animal products, many fast foods, and a few vegetables; increases total blood cholesterol, including LDL levels   Animal fats that are saturated include: butter, lard, whole-milk dairy products, meat fat, and poultry skin   Vegetable fats that are saturated include: hydrogenated shortening, palm oil, coconut oil, cocoa butter   Hydrogenated or trans fat  found in margarine and vegetable shortening, most shelf stable snack foods, and fried foods; increases LDL and decreases HDL     It is generally recommended that you limit your total fat for the day to less than 30% of your total calories. If you follow an 1800-calorie heart healthy diet, for example, this would mean 60 grams of fat or less per day.    Saturated fat and trans fat in your diet raises your blood cholesterol the most, much more than dietary cholesterol does. For this reason, on a heart-healthy diet, less than 7% of your calories should come from saturated fat and ideally 0% from trans fat. On an 1800-calorie diet, this translates into less than 14 grams of saturated fat per day, leaving 46 grams of fat to come from mono- and polyunsaturated fats.    Food Choices on a Heart Healthy Diet   Food Category   Foods Recommended   Foods to Avoid   Grains   Breads and rolls without salted tops Most dry and cooked cereals Unsalted crackers and breadsticks Low-sodium or homemade breadcrumbs or stuffing All rice and pastas   Breads, rolls, and crackers with salted tops High-fat baked goods (eg, muffins, donuts, pastries) Quick breads, self-rising flour, and biscuit mixes Regular bread crumbs Instant hot cereals Commercially prepared rice, pasta, or stuffing mixes   Vegetables   Most fresh, frozen, and low-sodium canned vegetables Low-sodium and salt-free vegetable juices Canned vegetables if unsalted or rinsed   Regular canned vegetables and juices, including sauerkraut and pickled vegetables Frozen vegetables with sauces Commercially prepared potato and vegetable mixes   Fruits   Most fresh, frozen, and canned fruits All fruit juices   Fruits processed with salt or sodium   Milk   Nonfat or low-fat (1%) milk Nonfat or low-fat yogurt Cottage cheese, low-fat ricotta, cheeses labeled as low-fat and low-sodium   Whole milk Reduced-fat (2%) milk Malted and chocolate milk Full fat yogurt Most cheeses (unless low-fat and low salt) Buttermilk (no more than 1 cup per week)   Meats and Beans   Lean cuts of fresh or frozen beef, veal, lamb, or pork (look for the word loin) Fresh or frozen poultry without the skin Fresh or frozen fish and some shellfish Egg whites and egg substitutes (Limit whole eggs to three per week) Tofu Nuts or seeds (unsalted, dry-roasted), low-sodium peanut butter Dried peas, beans, and lentils   Any smoked, cured, salted, or canned meat, fish, or poultry (including luong, chipped beef, cold cuts, hot dogs, sausages, sardines, and anchovies) Poultry skins Breaded and/or fried fish or meats Canned peas, beans, and lentils Salted nuts   Fats and Oils   Olive oil and canola oil Low-sodium, low-fat salad dressings and mayonnaise   Butter, margarine, coconut and palm oils, luong fat   Snacks, Sweets, and Condiments   Low-sodium or unsalted versions of broths, soups, soy sauce, and condiments Pepper, herbs, and spices; vinegar, lemon, or lime juice Low-fat frozen desserts (yogurt, sherbet, fruit bars) Sugar, cocoa powder, honey, syrup, jam, and preserves Low-fat, trans-fat free cookies, cakes, and pies Lex and animal crackers, fig bars, leonard snaps   High-fat desserts Broth, soups, gravies, and sauces, made from instant mixes or other high-sodium ingredients Salted snack foods Canned olives Meat tenderizers, seasoning salt, and most flavored vinegars   Beverages   Low-sodium carbonated beverages Tea and coffee in moderation Soy milk   Commercially softened water   Suggestions   Make whole grains, fruits, and vegetables the base of your diet. Choose heart-healthy fats such as canola, olive, and flaxseed oil, and foods high in heart-healthy fats, such as nuts, seeds, soybeans, tofu, and fish. Eat fish at least twice per week; the fish highest in omega-3 fatty acids and lowest in mercury include salmon, herring, mackerel, sardines, and canned chunk light tuna. If you eat fish less than twice per week or have high triglycerides, talk to your doctor about taking fish oil supplements. Read food labels. For products low in fat and cholesterol, look for fat free, low-fat, cholesterol free, saturated fat free, and trans fat freeAlso scan the Nutrition Facts Label, which lists saturated fat, trans fat, and cholesterol amounts.    For products low in sodium, look for sodium free, very low sodium, low sodium, no added salt, and unsalted   Skip the salt when cooking or at the table; if food needs more flavor, get creative and try out different herbs and spices. Garlic and onion also add substantial flavor to foods. Trim any visible fat off meat and poultry before cooking, and drain the fat off after peters. Use cooking methods that require little or no added fat, such as grilling, boiling, baking, poaching, broiling, roasting, steaming, stir-frying, and sauting. Avoid fast food and convenience food. They tend to be high in saturated and trans fat and have a lot of added salt. Talk to a registered dietitian for individualized diet advice. Last Reviewed: March 2011 Zulay Aguilera MS, MPH, RD   Updated: 3/29/2011       Preventing Osteoporosis: After Your Visit  Your Care Instructions  Osteoporosis means the bones are weak and thin enough that they can break easily. The older you are, the more likely you are to get osteoporosis. But with plenty of calcium, vitamin D, and exercise, you can help prevent osteoporosis. The preteen and teen years are a key time for bone building. With the help of calcium, vitamin D, and exercise in those early years and beyond, the bones reach their peak density and strength by age 27. After age 27, your bones naturally start to thin and weaken. The stronger your bones are at around age 27, the lower your risk for osteoporosis. But no matter what your age and risk are, your bones still need calcium, vitamin D, and exercise to stay strong. Also avoid smoking, and limit alcohol. Smoking and heavy alcohol use can make your bones thinner. Talk to your doctor about any special risks you might have, such as having a close relative with osteoporosis or taking a medicine that can weaken bones. Your doctor can tell you the best ways to protect your bones from thinning. Follow-up care is a key part of your treatment and safety.  Be sure to make and go to all appointments, and call your doctor if you are having problems. It's also a good idea to know your test results and keep a list of the medicines you take. How can you care for yourself at home? Get enough calcium and vitamin D. The [place] [employer] recommends adults younger than age 46 need 1,000 mg of calcium and 600 IU of vitamin D each day. Women ages 46 to 79 need 1,200 mg of calcium and 600 IU of vitamin D each day. Men ages 46 to 79 need 1,000 mg of calcium and 600 IU of vitamin D each day. Adults 71 and older need 1,200 mg of calcium and 800 IU of vitamin D each day. Eat foods rich in calcium, like yogurt, cheese, milk, and dark green vegetables. Eat foods rich in vitamin D, like eggs, fatty fish, cereal, and fortified milk. Get some sunshine. Your body uses sunshine to make its own vitamin D. The safest time to be out in the sun is before 10 a.m. or after 3 p.m. Avoid getting sunburned. Sunburn can increase your risk of skin cancer. Talk to your doctor about taking a calcium plus vitamin D supplement. Ask about what type of calcium is right for you, and how much to take at a time. Adults ages 23 to 48 should not get more than 2,500 mg of calcium and 4,000 IU of vitamin D each day, whether it is from supplements and/or food. Adults ages 46 and older should not get more than 2,000 mg of calcium and 4,000 IU of vitamin D each day from supplements and/or food. Get regular bone-building exercise. Weight-bearing and resistance exercises keep bones healthy by working the muscles and bones against gravity. Start out at an exercise level that feels right for you. Add a little at a time until you can do the following:  Do 30 minutes of weight-bearing exercise on most days of the week. Walking, jogging, stair climbing, and dancing are good choices. Do resistance exercises with weights or elastic bands 2 to 3 days a week. Limit alcohol. Drink no more than 1 alcohol drink a day if you are a woman. Drink no more than 2 alcohol drinks a day if you are a man. Do not smoke. Smoking can make bones thin faster. If you need help quitting, talk to your doctor about stop-smoking programs and medicines. These can increase your chances of quitting for good. When should you call for help? Watch closely for changes in your health, and be sure to contact your doctor if:  You need help with a healthy eating plan. You need help with an exercise plan    © 2903-9651 [employer]. Care instructions adapted under license by [facility]. This care instruction is for use with your licensed healthcare professional. If you have questions about a medical condition or this instruction, always ask your healthcare professional. [place] [address] any warranty or liability for your use of this information. Content Version: 9.4.34953; Last Revised: June 20, 2011                Keep Your Memory [place]       Many factors can affect your ability to remembera hectic lifestyle, aging, stress, chronic disease, and certain medicines. But, there are steps you can take to sharpen your mind and help preserve your memory. Challenge Your Brain   Regularly challenging your mind may help keeps it in top shape. Good mental exercises include:   Crossword puzzlesUse a dictionary if you need it; you will learn more that way. Brainteasers Try some! Crafts, such as wood working and sewing   Hobbies, such as gardening and building model airplanes   SocializingVisit old friends or join groups to meet new ones.    Reading   Learning a new language   Taking a class, whether it be art history or lucie chi   TravelingExperience the food, history, and culture of your destination   Learning to use a computer   Going to museums, the theater, or thought-provoking movies   Changing things in your daily life, such as reversing your pattern in the grocery store or brushing your teeth using your nondominant hand   Use Memory Aids   There is no need to remember every detail on your own. These memory aids can help:   Calendars and day planners   Electronic organizers to store all sorts of helpful informationThese devices can \"beep\" to remind you of appointments. A book of days to record birthdays, anniversaries, and other occasions that occur on the same date every year   Detailed \"to-do\" lists and strategically placed sticky notes   Quick \"study\" sessionsBefore a gathering, review who will be there so their names will be fresh in your mind. Establish routinesFor example, keep your keys, wallet, and umbrella in the same place all the time or take medicine with your 8:00 AM glass of juice   Live a Healthy Life   Many actions that will keep your body strong will do the same for your mind. For example:   Talk to Your Doctor About Herbs and Supplements    Malnutrition and vitamin deficiencies can impair your mental function. For example, vitamin B12 deficiency can cause a range of symptoms, including confusion. But, what if your nutritional needs are being met? Can herbs and supplements still offer a benefit? Researchers have investigated a range of natural remedies, such as ginkgo , ginseng , and the supplement phosphatidylserine (PS). So far, though, the evidence is inconsistent as to whether these products can improve memory or thinking. If you are interested in taking herbs and supplements, talk to your doctor first because they may interact with other medicines that you are taking. Exercise Regularly    Among the many benefits of regular exercise are increased blood flow to the brain and decreased risk of certain diseases that can interfere with memory function. One study found that even moderate exercise has a beneficial effect.  Examples of \"moderate\" exercise include:   Playing 18 holes of golf once a week, without a cart   Playing tennis twice a week   Walking one mile per day   Manage Stress    It can be tough to remember what is important when your mind is cluttered. Make time for relaxation. Choose activities that calm you down, and make it routine. Manage Chronic Conditions    Side effects of high blood pressure , diabetes, and heart disease can interfere with mental function. Many of the lifestyle steps discussed here can help manage these conditions. Strive to eat a healthy diet, exercise regularly, get stress under control, and follow your doctor's advice for your condition. Minimize Medications    Talk to your doctor about the medicines that you take. Some may be unnecessary. Also, healthy lifestyle habits may lower the need for certain drugs. Last Reviewed: April 2010 Torres Mackay MD   Updated: 4/13/2010     Smoking Cessation  This document explains the best ways for you to quit smoking and new treatments to help. It lists new medicines that can double or triple your chances of quitting and quitting for good. It also considers ways to avoid relapses and concerns you may have about quitting, including weight gain. NICOTINE: A POWERFUL ADDICTION  If you have tried to quit smoking, you know how hard it can be. It is hard because nicotine is a very addictive drug. For some people, it can be as addictive as heroin or cocaine. Usually, people make 2 or 3 tries, or more, before finally being able to quit. Each time you try to quit, you can learn about what helps and what hurts. Quitting takes hard work and a lot of effort, but you can quit smoking. QUITTING SMOKING IS ONE OF THE MOST IMPORTANT THINGS YOU WILL EVER DO. You will live longer, feel better, and live better. The impact on your body of quitting smoking is felt almost immediately:  Within 20 minutes, blood pressure decreases. Pulse returns to its normal level. After 8 hours, carbon monoxide levels in the blood return to normal. Oxygen level increases. After 24 hours, chance of heart attack starts to decrease.  Breath, hair, and body stop smelling like smoke.  After 48 hours, damaged nerve endings begin to recover. Sense of taste and smell improve. After 72 hours, the body is virtually free of nicotine. Bronchial tubes relax and breathing becomes easier. After 2 to 12 weeks, lungs can hold more air. Exercise becomes easier and circulation improves. Quitting will reduce your risk of having a heart attack, stroke, cancer, or lung disease:  After 1 year, the risk of coronary heart disease is cut in half. After 5 years, the risk of stroke falls to the same as a nonsmoker. After 10 years, the risk of lung cancer is cut in half and the risk of other cancers decreases significantly. After 15 years, the risk of coronary heart disease drops, usually to the level of a nonsmoker. If you are pregnant, quitting smoking will improve your chances of having a healthy baby. The people you live with, especially your children, will be healthier. You will have extra money to spend on things other than cigarettes. FIVE KEYS TO QUITTING  Studies have shown that these 5 steps will help you quit smoking and quit for good. You have the best chances of quitting if you use them together:  Get ready. Get support and encouragement. Learn new skills and behaviors. Get medicine to reduce your nicotine addiction and use it correctly. Be prepared for relapse or difficult situations. Be determined to continue trying to quit, even if you do not succeed at first.  1. GET READY  Set a quit date. Change your environment. Get rid of ALL cigarettes, ashtrays, matches, and lighters in your home, car, and place of work. Do not let people smoke in your home. Review your past attempts to quit. Think about what worked and what did not. Once you quit, do not smoke. NOT EVEN A PUFF! 2. GET SUPPORT AND ENCOURAGEMENT  Studies have shown that you have a better chance of being successful if you have help. You can get support in many ways.   Tell your family, friends, and coworkers that you are going to quit and need their support. Ask them not to smoke around you. Talk to your caregivers (doctor, dentist, nurse, pharmacist, psychologist, and/or smoking counselor). Get individual, group, or telephone counseling and support. The more counseling you have, the better your chances are of quitting. Programs are available at [facility]. Call your local health department for information about programs in your area. Spiritual beliefs and practices may help some smokers quit. Quit meters are small computer programs online or downloadable that keep track of quit statistics, such as amount of \"quit-time,\" cigarettes not smoked, and money saved. Many smokers find one or more of the many self-help books available useful in helping them quit and stay off tobacco.  3. LEARN NEW SKILLS AND BEHAVIORS  Try to distract yourself from urges to smoke. Talk to someone, go for a walk, or occupy your time with a task. When you first try to quit, change your routine. Take a different route to work. Drink tea instead of coffee. Eat breakfast in a different place. Do something to reduce your stress. Take a hot bath, exercise, or read a book. Plan something enjoyable to do every day. Reward yourself for not smoking. Explore interactive web-based programs that specialize in helping you quit. 4. GET MEDICINE AND USE IT CORRECTLY  Medicines can help you stop smoking and decrease the urge to smoke. Combining medicine with the above behavioral methods and support can quadruple your chances of successfully quitting smoking. The [place] Food and Drug Administration (FDA) has approved 7 medicines to help you quit smoking. These medicines fall into 3 categories. Nicotine replacement therapy (delivers nicotine to your body without the negative effects and risks of smoking):  Nicotine gum: Available over-the-counter. Nicotine lozenges: Available over-the-counter.   Nicotine inhaler: Available by prescription.  Nicotine nasal spray: Available by prescription.  Nicotine skin patches (transdermal): Available by prescription and over-the-counter.  Antidepressant medicine (helps people abstain from smoking, but how this works is unknown):  Bupropion sustained-release (SR) tablets: Available by prescription.  Nicotinic receptor partial agonist (simulates the effect of nicotine in your brain):  Varenicline tartrate tablets: Available by prescription.  Ask your caregiver for advice about which medicines to use and how to use them. Carefully read the information on the package.  Everyone who is trying to quit may benefit from using a medicine. If you are pregnant or trying to become pregnant, nursing an infant, you are under age 18, or you smoke fewer than 10 cigarettes per day, talk to your caregiver before taking any nicotine replacement medicines.  You should stop using a nicotine replacement product and call your caregiver if you experience nausea, dizziness, weakness, vomiting, fast or irregular heartbeat, mouth problems with the lozenge or gum, or redness or swelling of the skin around the patch that does not go away.  Do not use any other product containing nicotine while using a nicotine replacement product.  Talk to your caregiver before using these products if you have diabetes, heart disease, asthma, stomach ulcers, you had a recent heart attack, you have high blood pressure that is not controlled with medicine, a history of irregular heartbeat, or you have been prescribed medicine to help you quit smoking.  5. BE PREPARED FOR RELAPSE OR DIFFICULT SITUATIONS  Most relapses occur within the first 3 months after quitting. Do not be discouraged if you start smoking again. Remember, most people try several times before they finally quit.  You may have symptoms of withdrawal because your body is used to nicotine. You may crave cigarettes, be irritable, feel very hungry, cough often, get headaches, or have  difficulty concentrating. The withdrawal symptoms are only temporary. They are strongest when you first quit, but they will go away within 10 to 14 days. Here are some difficult situations to watch for:  Alcohol. Avoid drinking alcohol. Drinking lowers your chances of successfully quitting. Caffeine. Try to reduce the amount of caffeine you consume. It also lowers your chances of successfully quitting. Other smokers. Being around smoking can make you want to smoke. Avoid smokers. Weight gain. Many smokers will gain weight when they quit, usually less than 10 pounds. Eat a healthy diet and stay active. Do not let weight gain distract you from your main goal, quitting smoking. Some medicines that help you quit smoking may also help delay weight gain. You can always lose the weight gained after you quit. Bad mood or depression. There are a lot of ways to improve your mood other than smoking. If you are having problems with any of these situations, talk to your caregiver. SPECIAL SITUATIONS AND CONDITIONS  Studies suggest that everyone can quit smoking. Your situation or condition can give you a special reason to quit. Pregnant women/new mothers: By quitting, you protect your baby's health and your own. Hospitalized patients: By quitting, you reduce health problems and help healing. Heart attack patients: By quitting, you reduce your risk of a second heart attack. Lung, head, and neck cancer patients: By quitting, you reduce your chance of a second cancer. Parents of children and adolescents: By quitting, you protect your children from illnesses caused by secondhand smoke. QUESTIONS TO THINK ABOUT  Think about the following questions before you try to stop smoking. You may want to talk about your answers with your caregiver. Why do you want to quit? If you tried to quit in the past, what helped and what did not? What will be the most difficult situations for you after you quit?  How will you plan to handle them? Who can help you through the tough times? Your family? Friends? Caregiver? What pleasures do you get from smoking? What ways can you still get pleasure if you quit? Here are some questions to ask your caregiver: How can you help me to be successful at quitting? What medicine do you think would be best for me and how should I take it? What should I do if I need more help? What is smoking withdrawal like? How can I get information on withdrawal?  Quitting takes hard work and a lot of effort, but you can quit smoking. FOR MORE INFORMATION   Smokefree. gov ([employer]) provides free, accurate, evidence-based information and professional assistance to help support the immediate and long-term needs of people trying to quit smoking. Document Released: 12/12/2002 Document Revised: 12/06/2012 Document Reviewed: 10/04/2010  [facility] Patient Information ©2012 Gladys Ureña. Keeping Home a [address]       As we get older, changes in balance, gait, strength, vision, hearing, and cognition make even the most youthful senior more prone to accidents. Falls are one of the leading health risks for older people. This increased risk of falling is related to:   Aging process (eg, decreased muscle strength, slowed reflexes)   Higher incidence of chronic health problems (eg, arthritis, diabetes) that may limit mobility, agility or sensory awareness   Side effects of medicine (eg, dizziness, blurred vision)especially medicines like prescription pain medicines and drugs used to treat mental health conditions   Depending on the brittleness of your bones, the consequences of a fall can be serious and long lasting. Home Life   Research by the Association of Aging [facility]) shows that some home accidents among older adults can be prevented by making simple lifestyle changes and basic modifications and repairs to the home environment.  Here are some lifestyle changes that experts recommend:   Have your hearing and vision checked regularly. Be sure to wear prescription glasses that are right for you. Speak to your doctor or pharmacist about the possible side effects of your medicines. A number of medicines can cause dizziness. If you have problems with sleep, talk to your doctor. Limit your intake of alcohol. If necessary, use a cane or walker to help maintain your balance. Wear supportive, rubber-soled shoes, even at home. If you live in a region that gets wintry weather, you may want to put special cleats on your shoes to prevent you from slipping on the snow and ice. Exercise regularly to help maintain muscle tone, agility, and balance. Always hold the banister when going up or down stairs. Also, use  bars when getting in or out of the bath or shower, or using the toilet. To avoid dizziness, get up slowly from a lying down position. Sit up first, dangling your legs for a minute or two before rising to a standing position. Overall Home Safety Check   According to the Consumer Product Safety Commision's \"Older Consumer Home Safety Checklist,\" it is important to check for potential hazards in each room. And remember, proper lighting is an essential factor in home safety. If you cannot see clearly, you are more likely to fall. Important questions to ask yourself include:   Are lamp, electric, extension, and telephone cords placed out of the flow of traffic and maintained in good condition? Have frayed cords been replaced? Are all small rugs and runners slip resistant? If not, you can secure them to the floor with a special double-sided carpet tape. Are smoke detectors properly locatedone on every floor of your home and one outside of every sleeping area? Are they in good working order? Are batteries replaced at least once a year? Do you have a well-maintained carbon monoxide detector outside every sleeping are in your home?    Does your furniture layout leave plenty of space to maneuver between and around chairs, tables, beds, and sofas? Are hallways, stairs and passages between rooms well lit? Can you reach a lamp without getting out of bed? Are floor surfaces well maintained? Shag rugs, high-pile carpeting, tile floors, and polished wood floors can be particularly slippery. Stairs should always have handrails and be carpeted or fitted with a non-skid tread. Is your telephone easily reachable. Is the cord safely tucked away? Room by Room   According to the Association of Aging, bathrooms and marielle are the two most potentially hazardous rooms in your home. In the Kitchen    Be sure your stove is in proper working order and always make sure burners and the oven are off before you go out or go to sleep. Keep pots on the back burners, turn handles away from the front of the stove, and keep stove clean and free of grease build-up. Kitchen ventilation systems and range exhausts should be working properly. Keep flammable objects such as towels and pot holders away from the cooking area except when in use. Make sure kitchen curtains are tied back. Move cords and appliances away from the sink and hot surfaces. If extension cords are needed, install wiring guides so they do not hang over the sink, range, or working areas. Look for coffee pots, kettles and toaster ovens with automatic shut-offs. Keep a mop handy in the kitchen so you can wipe up spills instantly. You should also have a small fire extinguisher. Arrange your kitchen with frequently used items on lower shelves to avoid the need to stand on a stepstool to reach them. Make sure countertops are well-lit to avoid injuries while cutting and preparing food. In the Bathroom    Use a non-slip mat or decals in the tub and shower, since wet, soapy tile or porcelain surfaces are extremely slippery. Make sure bathroom rugs are non-skid or tape them firmly to the floor.  Bathtubs should have at least one, preferably two, grab bars, firmly attached to structural supports in the wall. (Do not use built-in soap holders or glass shower doors as grab bars.)    Tub seats fitted with non-slip material on the legs allow you to wash sitting down. For people with limited mobility, bathtub transfer benches allow you to slide safely into the tub. Raised toilet seats and toilet safety rails are helpful for those with knee or hip problems. In the [place]    Make sure you use a nightlight and that the area around your bed is clear of potential obstacles. Be careful with electric blankets and never go to sleep with a heating pad, which can cause serious burns even if on a low setting. Use fire-resistant mattress covers and pillows, and NEVER smoke in bed. Keep a phone next to the bed that is programmed to dial 911 at the push of a button. If you have a chronic condition, you may want to sign on with an automatic call-in service. Typically the system includes a small pendant that connects directly to an emergency medical voice-response system. You should also make arrangements to stay in contact with someonefriend, neighbor, family memberon a regular schedule. Fire Prevention   According to the [employer] (Smoke Alarms for Every) [address] [place] [address], senior citizens are one of the two highest risk groups for death and serious injuries due to residential fires. When cooking, wear short-sleeved items, never a bulky long-sleeved robe. The [facility] Safety Checklist for Older Consumers emphasizes the importance of checking basements, garages, workshops and storage areas for fire hazards, such as volatile liquids, piles of old rags or clothing and overloaded circuits. Never smoke in bed or when lying down on a couch or recliner chair. Small portable electric or kerosene heaters are responsible for many home fires and should be used cautiously if at all.  If you do use one, be sure to keep them away from flammable materials. In case of fire, make sure you have a pre-established emergency exit plan. Have a professional check your fireplace and other fuel-burning appliances yearly. [employer]   Baby boomers entering the peña years will continue to see the development of new products to help older adults live safely and independently in spite of age-related changes. Making Life More Livable  , by Kami Hicks, lists over 1,000 products for \"living well in the mature years,\" such as bathing and mobility aids, household security devices, ergonomically designed knives and peelers, and faucet valves and knobs for temperature control. Medical supply stores and organizations are good sources of information about products that improve your quality of life and insure your safety. Last Reviewed: November 2009 Gris Plascencia MD   Updated: 3/7/2011            Advance Care Planning: Care Instructions  Your Care Instructions     It can be hard to live with an illness that cannot be cured. But if your health is getting worse, you may want to make decisions about end-of-life care. Planning for the end of your life does not mean that you are giving up. It is a way to make sure that your wishes are met. Clearly stating your wishes can make it easier for your loved ones. Making plans while you are still able may also ease your mind and make your final days less stressful and more meaningful. Follow-up care is a key part of your treatment and safety. Be sure to make and go to all appointments, and call your doctor if you are having problems. It's also a good idea to know your test results and keep a list of the medicines you take. What can you do to plan for the end of life? You can bring these issues up with your doctor. You do not need to wait until your doctor starts the conversation. You might start with, \"What makes life worth living for me is. Elisabeth Murray .\" And then follow it with, \"I would not be willing to live with . Elisabeth Murray \" When you complete this sentence it helps your doctor understand your wishes. Talk openly and honestly with your doctor. This is the best way to understand the decisions you will need to make as your health changes. Know that you can always change your mind. Ask your doctor about commonly used life-support measures. These include tube feedings, breathing machines, and fluids given through a vein (IV). Understanding these treatments will help you decide whether you want them. You may choose to have these life-supporting treatments for a limited time. This allows a trial period to see whether they will help you. You may also decide that you want your doctor to take only certain measures to keep you alive. It may help to think about the big picture, like what makes life worth living for you or what your values and goals are. Talk to your doctor about how long you are likely to live. Your doctor may be able to give you an idea of what usually happens with your specific illness. Think about preparing papers that state your wishes. These papers are called advance directives. If you do this early and review them often, there will not be any confusion about what you want. You can change your instructions at any time. Which papers should you prepare? Advance directives are legal papers that tell doctors how you want to be cared for at the end of your life. You do not need a  to write these papers. Ask your doctor or your state health department for information on how to write your advance directives. They may have the forms for each of these types of papers. Make sure your doctor has a copy of these on file, and give a copy to a family member or close friend. Consider a do-not-resuscitate order (DNR). This order asks that no extra treatments be done if your heart stops or you stop breathing.  Extra treatments may include cardiopulmonary resuscitation (CPR), electrical shock to restart your heart, or a machine to breathe for you. If you decide to have a DNR order, ask your doctor to explain and write it. Place the order in your home where everyone can easily see it. Consider a living will. A living will explains your wishes about life support and other treatments at the end of your life if you become unable to speak for yourself. Living andrade tell doctors to use or not use treatments that would keep you alive. You must have one or two witnesses or a notary present when you sign this form. A living will may be called something else in your state. Consider a medical power of . This form allows you to name a person to make decisions about your care if you are not able to. Most people ask a close friend or family member. Talk to this person about the kinds of treatments you want and those that you do not want. Make sure this person understands your wishes. A medical power of  may be called something else in your state. These legal papers are simple to change. Tell your doctor what you want to change, and ask him or her to make a note in your medical file. Give your family updated copies of the papers. Where can you learn more? Go to http://www.lewis.com/ and enter P184 to learn more about \"Advance Care Planning: Care Instructions. \"  Current as of: October 6, 2021               Content Version: 13.5  © 0979-8136 Healthwise, Incorporated. Care instructions adapted under license by [employer]. If you have questions about a medical condition or this instruction, always ask your healthcare professional. [place] [address] any warranty or liability for your use of this information. Learning About Living Honor Najjar  What is a living will? A living will, also called a declaration, is a legal form. It tells your family and your doctor your wishes when you can't speak for yourself.  It's used by the health professionals who will treat you as you near the end of your life or if you get seriously hurt or ill. If you put your wishes in writing, your loved ones and others will know what kind of care you want. They won't need to guess. This can ease your mind and be helpful to others. And you can change or cancel your living will at any time. A living will is not the same as an estate or property will. An estate will explains what you want to happen with your money and property after you die. How do you use it? Keep these facts in mind about how a living will is used. Your living will is used only if you can't speak or make decisions for yourself. Most often, one or more doctors must certify that you can't speak or decide for yourself before your living will takes effect. If you get better and can speak for yourself again, you can accept or refuse any treatment. It doesn't matter what you said in your living will. Some states may limit your right to refuse treatment in certain cases. For example, you may need to clearly state in your living will that you don't want artificial hydration and nutrition, such as being fed through a tube. Is a living will a legal document? A living will is a legal document. Each state has its own laws about living andrade. And a living will may be called something else in your state. Here are some things to know about living andrade. You don't need an  to complete a living will. But legal advice can be helpful if your state's laws are unclear. It can also help if your health history is complicated or your family can't agree on what should be in your living will. You can change your living will at any time. Some people find that their wishes about end-of-life care change as their health changes. If you make big changes to your living will, complete a new form. If you move to another state, make sure that your living will is legal in the state where you now live.  In most cases, doctors will respect your wishes even if you have a form from a different state. You might use a universal form that has been approved by many states. This kind of form can sometimes be filled out and stored online. Your digital copy will then be available wherever you have a connection to the internet. The doctors and nurses who need to treat you can find it right away. Your state may offer an online registry. This is another place where you can store your living will online. It's a good idea to get your living will notarized. This means using a person called a [employer] to watch two people sign, or witness, your living will. What should you know when you create a living will? Here are some questions to ask yourself as you make your living will. Do you know enough about life support methods that might be used? If not, talk to your doctor so you know what might be done if you can't breathe on your own, your heart stops, or you can't swallow. What things would you still want to be able to do after you receive life-support methods? Would you want to be able to walk? To speak? To eat on your own? To live without the help of machines? Do you want certain Buddhist practices performed if you become very ill? If you have a choice, where do you want to be cared for? In your home? At a hospital or nursing home? If you have a choice at the end of your life, where would you prefer to die? At home? In a hospital or nursing home? Somewhere else? Would you prefer to be buried or cremated? Do you want your organs to be donated after you die? What should you do with your living will? Make sure that your family members and your health care agent have copies of your living will (also called a declaration). Give your doctor a copy of your living will. Ask to have it kept as part of your medical record. If you have more than one doctor, make sure that each one has a copy. Put a copy of your living will where it can be easily found.  For example, some people may put a copy on their refrigerator door. If you are using a digital copy, be sure your doctor, family members, and health care agent know how to find and access it. Where can you learn more? Go to http://www.lewis.com/ and enter K356 to learn more about \"Learning About Living Gabriella. \"  Current as of: June 16, 2022               Content Version: 13.5  © 2006-2022 [employer]. Care instructions adapted under license by [facility] ([facility]). If you have questions about a medical condition or this instruction, always ask your healthcare professional. [place] [address] any warranty or liability for your use of this information. Learning About Medical Power of   What is a medical power of ? A medical power of , also called a durable power of  for health care, is one type of the legal forms called advance directives. It lets you name the person you want to make treatment decisions for you if you can't speak or decide for yourself. The person you choose is called your health care agent. This person is also called a health care proxy or health care surrogate. A medical power of  may be called something else in your state. How do you choose a health care agent? Choose your health care agent carefully. This person may or may not be a family member. Talk to the person before you make your final decision. Make sure he or she is comfortable with this responsibility. It's a good idea to choose someone who:  Is at least 25years old. Knows you well and understands what makes life meaningful for you. Understands your Orthodoxy and moral values. Will do what you want, not what he or she wants. Will be able to make difficult choices at a stressful time. Will be able to refuse or stop treatment, if that is what you would want, even if you could die. Will be firm and confident with health professionals if needed.   Will ask questions to get needed information. Lives near you or agrees to travel to you if needed. Your family may help you make medical decisions while you can still be part of that process. But it's important to choose one person to be your health care agent in case you aren't able to make decisions for yourself. If you don't fill out the legal form and name a health care agent, the decisions your family can make may be limited. A health care agent may be called something else in your state. Who will make decisions for you if you don't have a health care agent? If you don't have a health care agent or a living will, you may not get the care you want. Decisions may be made by family members who disagree about your medical care. Or decisions may be made by a medical professional who doesn't know you well. In some cases, a  makes the decisions. When you name a health care agent, it is very clear who has the power to make health decisions for you. How do you name a health care agent? You name your health care agent on a legal form. This form is usually called a medical power of . Ask your hospital, state bar association, or office on aging where to find these forms. You must sign the form to make it legal. Some states require you to get the form notarized. This means that a person called a  watches you sign the form and then he or she signs the form. Some states also require that two or more witnesses sign the form. Be sure to tell your family members and doctors who your health care agent is. Where can you learn more? Go to http://www.woods.com/ and enter P737 to learn more about \"Learning About [address]. \"  Current as of: October 6, 2021               Content Version: 13.5  © 2006-2022 Healthwise, Incorporated. Care instructions adapted under license by [facility] [employer] [facility] ([facility]).  If you have questions about a medical condition or this instruction, always ask your healthcare professional. [employer] disclaims any warranty or liability for your use of this information.

## 2023-02-13 NOTE — PROGRESS NOTES
Medicare Annual Wellness Visit    Jolie Mendoza is here for Medicare AWV    Assessment & Plan   Medicare annual wellness visit, subsequent      Recommendations for Preventive Services Due: see orders and patient instructions/AVS.  Recommended screening schedule for the next 5-10 years is provided to the patient in written form: see Patient Instructions/AVS.     No follow-ups on file. Katie Robbins is doing fair. She is trying to wean off of her pain medication with Pain Management. She is reducing that and is doing well in this process. She does stay very active. She struggles with pain and fatigue daily. She is depressed but is not suicidal.     Patient's complete Health Risk Assessment and screening values have been reviewed and are found in Flowsheets. The following problems were reviewed today and where indicated follow up appointments were made and/or referrals ordered. Positive Risk Factor Screenings with Interventions:    Fall Risk:  Do you feel unsteady or are you worried about falling? : (!) yes  2 or more falls in past year?: (!) yes  Fall with injury in past year?: (!) yes     Interventions:    Patient declines any further evaluation or treatment     Depression:  PHQ-2 Score: 2  PHQ-9 Total Score: 15    Interpretation:   1-4 = minimal  5-9 = mild  10-14 = moderate  15-19 = moderately severe  20-27 = severe  Interventions:  LPN INTERVENTION GUIDE: SCORE 15 OR ABOVE = MODERATE TO SEVERE DEPRESSION: PCP CONSULTED DURING VISIT  Patient denies any suicidal thoughts or plans. She is just tired of feeling like she cannot do anything because of her leg and back problems. Opioid Risk: (Low risk score <55) Opioid risk score: 54    Patient is low risk for opioid use disorder or overdose.   Last PDMP Nicolle Peterson as Reviewed:  Review User Review Instant Review Result              Last Controlled Substance Monitoring Documentation      [id number] from 10/17/2020 in [facility] EMERGENCY DEPT Attestation The Prescription Monitoring Report for this patient was reviewed today. [[id number]] filed at 10/17/2020 1153              General HRA Questions:  Select all that apply: (!) New or Increased Pain, New or Increased Fatigue, Social Isolation, Stress    Pain Interventions:  Patient declined any further interventions or treatment  Patient is currently weaning off some of her medication with pain management. Fatigue Interventions:  Patient declined any further interventions or treatment    Social Isolation Interventions:  Patient declined any further interventions or treatment    Stress Interventions:  Patient comments: States is it very frustrating being unable to lift and do things on her own. Patient declined any further interventions or treatment       Weight and Activity:  Physical Activity: Insufficiently Active    Days of Exercise per Week: 3 days    Minutes of Exercise per Session: 30 min     On average, how many days per week do you engage in moderate to strenuous exercise (like a brisk walk)?: 3 days  Have you lost any weight without trying in the past 3 months?: No  Body mass index: (!) 38.2    Obesity Interventions:  Patient declines any further evaluation or treatment    Dentist Screen:  Have you seen the dentist within the past year?: (!) No    Intervention:  Advised to schedule with their dentist     Vision Screen:  Do you have difficulty driving, watching TV, or doing any of your daily activities because of your eyesight?: (!) Yes  Have you had an eye exam within the past year?: Yes  No results found.     Interventions:   Patient encouraged to make appointment with their eye specialist     ADL's:   Patient reports needing help with:  Select all that apply: (!) Laundry, Housekeeping, Shopping  Interventions:  Patient declined any further interventions or treatment    Advanced Directives:  Do you have a Living Will?: (!) No    Intervention:  has NO advanced directive - information provided        Tobacco Use:  Tobacco Use: High Risk    Smoking Tobacco Use: Every Day    Smokeless Tobacco Use: Former    Passive Exposure: Not on file     E-cigarette/Vaping       Questions Responses    E-cigarette/Vaping Use     Start Date     Passive Exposure     Quit Date     Counseling Given     Comments           Interventions:  Patient declined any further intervention or treatment          Objective   Vitals:    02/13/23 1300   BP: 138/78   Site: Right Upper Arm   Position: Sitting   Cuff Size: Medium Adult   Pulse: 84   Resp: 20   Temp: 98 °F (36.7 °C)   TempSrc: Oral   SpO2: 98%   Weight: 195 lb 9.6 oz (88.7 kg)   Height: 5' (1.524 m)      Body mass index is 38.2 kg/m². Recent and remote memory are both intact. Patient recalled the words without diff. Allergies   Allergen Reactions    Adhesive Tape Hives    Ace Inhibitors      Cough     Lantus [Insulin Glargine]      Causing whelps    Levemir [Insulin Detemir]      Whelps    Romosozumab      Other reaction(s): Other (see comments)  Local reaction at injection site    Demerol Nausea And Vomiting     Prior to Visit Medications    Medication Sig Taking? Authorizing Provider   diclofenac (VOLTAREN) 50 MG EC tablet TAKE 1 TABLET BY MOUTH TWICE DAILY Yes Historical Provider, MD   busPIRone (BUSPAR) 15 MG tablet TAKE 1 TABLET BY MOUTH EVERY 8 HOURS Yes Historical Provider, MD   calcium carbonate 600 MG TABS tablet Take 1 tablet by mouth daily Yes Historical Provider, MD   zoledronic acid (RECLAST) 5 MG/100ML SOLN Infuse 5 mg intravenously once Yes Historical Provider, MD   mirabegron (MYRBETRIQ) 50 MG TB24 Take 50 mg by mouth daily Yes Leigh Ann Sandoval, APRN - CNP   simvastatin (ZOCOR) 20 MG tablet TAKE 1 TABLET BY MOUTH ONCE DAILY IN THE EVENING Yes Ronny Fothergill, MD   losartan (COZAAR) 50 MG tablet Take 1 tablet by mouth daily Yes Ronny Fothergill, MD   clotrimazole-betamethasone (LOTRISONE) 1-0.05 % cream Apply topically 2 times daily.  Abdominal rash Yes AURY Casillas   memantine (NAMENDA) 10 MG tablet Take 10 mg by mouth in the morning and 10 mg before bedtime. Yes Historical Provider, MD   vitamin D (ERGOCALCIFEROL) 1.25 MG (06023 UT) CAPS capsule Take 50,000 Units by mouth once a week Yes Historical Provider, MD   escitalopram (LEXAPRO) 20 MG tablet Take 20 mg by mouth daily Yes Historical Provider, MD   morphine (MS CONTIN) 100 MG extended release tablet Take 100 mg by mouth every 8-12 hours as needed (weaning off 75mg in morning 60mg afternoon, 75mg night.). Yes Historical Provider, MD   oxyCODONE (OXY-IR) 15 MG immediate release tablet Take 15 mg by mouth every 2 hours as needed. Yes Historical Provider, MD   gabapentin (NEURONTIN) 600 MG tablet 600 mg 3 times daily. Yes Historical Provider, MD   ondansetron (ZOFRAN) 8 MG tablet Take 8 mg by mouth every 8-12 hours as needed Yes Historical Provider, MD   lidocaine (LIDODERM) 5 %  Yes Historical Provider, MD   omeprazole (PRILOSEC) 40 MG delayed release capsule Take 40 mg by mouth daily Yes Historical Provider, MD   senna (SENOKOT) 8.6 MG tablet Take 1 tablet by mouth as needed Yes Historical Provider, MD   Docusate Sodium (DSS) 250 MG CAPS Take 250 mg by mouth every 12 hours Yes Historical Provider, MD   lactulose ([facility]) 10 GM/15ML solution  Yes Historical Provider, MD   melatonin (RA MELATONIN) 3 MG TABS tablet Take 1 tablet by mouth daily  Patient taking differently: Take 6 mg by mouth daily Yes AURY Casillas   tiZANidine (ZANAFLEX) 4 MG tablet Take 1 tablet by mouth every 8 hours as needed (muscle pain) Yes Mary Lou Hall MD   Blood Glucose Monitoring Suppl (ACURA BLOOD GLUCOSE METER) w/Device KIT 1 Device by Does not apply route 4 times daily (after meals and at bedtime) Yes Mary Lou Hall MD   multivitamin [facility]) per tablet Take 1 tablet by mouth daily. Yes Historical Provider, MD   aspirin 81 MG tablet Take 81 mg by mouth daily.    Yes Historical Provider, MD   Polyethylene Glycol 3350 (MIRALAX PO) Take by mouth daily  Patient not taking: Reported on 2/13/2023  Historical Provider, MD   glucose monitoring kit (FREESTYLE) monitoring kit 1 kit by Does not apply route daily DX: Diabetes  Candice Greene MD       [facility] (Including outside providers/suppliers regularly involved in providing care):   Patient Care Team:  Candice Greene MD as PCP - General (Family Medicine)  Candice Greene MD as PCP - Empaneled Provider  Mehran Enrique MD (Gastroenterology)     Reviewed and updated this visit:  Allergies  Meds  Med Hx  Surg Hx  Soc Hx  Fam Hx      Lab Work was done at end of year last year. Patient states she gets labs every 3 months with her Hematologist/Oncologist. Health Maintenance was reviewed with the patient and updated. Alis Vásquez LPN, 4/07/1984, performed the documented evaluation under the direct supervision of the attending physician.       Linda Lee LPN

## 2023-03-02 ENCOUNTER — HOSPITAL ENCOUNTER (OUTPATIENT)
Dept: INFUSION THERAPY | Age: 58
Discharge: HOME | End: 2023-03-02
Payer: MEDICARE

## 2023-03-02 ENCOUNTER — OFFICE VISIT (OUTPATIENT)
Dept: HEMATOLOGY | Age: 58
End: 2023-03-02
Payer: COMMERCIAL

## 2023-03-02 VITALS
OXYGEN SATURATION: 89 % | DIASTOLIC BLOOD PRESSURE: 64 MMHG | HEART RATE: 82 BPM | BODY MASS INDEX: 37.79 KG/M2 | HEIGHT: 60 IN | SYSTOLIC BLOOD PRESSURE: 110 MMHG | WEIGHT: 192.5 LBS

## 2023-03-02 DIAGNOSIS — C90.30 SOLITARY PLASMACYTOMA OF BONE (HCC): Primary | ICD-10-CM

## 2023-03-02 DIAGNOSIS — Z72.0 TOBACCO ABUSE: ICD-10-CM

## 2023-03-02 DIAGNOSIS — M54.41 CHRONIC BILATERAL LOW BACK PAIN WITH BILATERAL SCIATICA: ICD-10-CM

## 2023-03-02 DIAGNOSIS — C90.30 SOLITARY PLASMACYTOMA OF BONE (HCC): ICD-10-CM

## 2023-03-02 DIAGNOSIS — G89.29 CHRONIC BILATERAL LOW BACK PAIN WITH BILATERAL SCIATICA: ICD-10-CM

## 2023-03-02 DIAGNOSIS — M54.42 CHRONIC BILATERAL LOW BACK PAIN WITH BILATERAL SCIATICA: ICD-10-CM

## 2023-03-02 LAB
HCT VFR BLD CALC: 47.5 % (ref 34.1–44.9)
HEMOGLOBIN: 15.2 G/DL (ref 11.2–15.7)
LYMPHOCYTES ABSOLUTE: 1.5 K/UL (ref 1.18–3.74)
LYMPHOCYTES RELATIVE PERCENT: 23.3 % (ref 19.3–53.1)
MCH RBC QN AUTO: 31.3 PG (ref 25.6–32.2)
MCHC RBC AUTO-ENTMCNC: 32 G/DL (ref 32.3–35.5)
MCV RBC AUTO: 97.7 FL (ref 79.4–94.8)
MONOCYTES ABSOLUTE: 0.4 K/UL (ref 0.24–0.82)
MONOCYTES RELATIVE PERCENT: 7.1 % (ref 4.7–12.5)
NEUTROPHILS ABSOLUTE: 4.4 K/UL (ref 1.56–6.13)
NEUTROPHILS RELATIVE PERCENT: 69.6 % (ref 34–71.1)
PDW BLD-RTO: 12.8 % (ref 11.7–14.4)
PLATELET # BLD: 173 K/UL (ref 182–369)
PMV BLD AUTO: 8.7 FL (ref 7.4–10.4)
RBC # BLD: 4.86 M/UL (ref 3.93–5.22)
WBC # BLD: 6.3 K/UL (ref 3.98–10.04)

## 2023-03-02 PROCEDURE — 4004F PT TOBACCO SCREEN RCVD TLK: CPT | Performed by: NURSE PRACTITIONER

## 2023-03-02 PROCEDURE — G8417 CALC BMI ABV UP PARAM F/U: HCPCS | Performed by: NURSE PRACTITIONER

## 2023-03-02 PROCEDURE — 3074F SYST BP LT 130 MM HG: CPT | Performed by: NURSE PRACTITIONER

## 2023-03-02 PROCEDURE — 99213 OFFICE O/P EST LOW 20 MIN: CPT | Performed by: NURSE PRACTITIONER

## 2023-03-02 PROCEDURE — G8428 CUR MEDS NOT DOCUMENT: HCPCS | Performed by: NURSE PRACTITIONER

## 2023-03-02 PROCEDURE — G8482 FLU IMMUNIZE ORDER/ADMIN: HCPCS | Performed by: NURSE PRACTITIONER

## 2023-03-02 PROCEDURE — 3078F DIAST BP <80 MM HG: CPT | Performed by: NURSE PRACTITIONER

## 2023-03-02 PROCEDURE — 36415 COLL VENOUS BLD VENIPUNCTURE: CPT

## 2023-03-02 PROCEDURE — 3017F COLORECTAL CA SCREEN DOC REV: CPT | Performed by: NURSE PRACTITIONER

## 2023-03-02 PROCEDURE — 85025 COMPLETE CBC W/AUTO DIFF WBC: CPT

## 2023-03-02 PROCEDURE — 99211 OFF/OP EST MAY X REQ PHY/QHP: CPT

## 2023-03-06 DIAGNOSIS — M54.41 CHRONIC BILATERAL LOW BACK PAIN WITH BILATERAL SCIATICA: ICD-10-CM

## 2023-03-06 DIAGNOSIS — M54.42 CHRONIC BILATERAL LOW BACK PAIN WITH BILATERAL SCIATICA: ICD-10-CM

## 2023-03-06 DIAGNOSIS — C90.30 SOLITARY PLASMACYTOMA OF BONE (HCC): Primary | ICD-10-CM

## 2023-03-06 DIAGNOSIS — G89.29 CHRONIC BILATERAL LOW BACK PAIN WITH BILATERAL SCIATICA: ICD-10-CM

## 2023-03-07 ASSESSMENT — ENCOUNTER SYMPTOMS
EYE DISCHARGE: 0
DIARRHEA: 0
EYE REDNESS: 0
GASTROINTESTINAL NEGATIVE: 1
BACK PAIN: 1
WHEEZING: 0
EYES NEGATIVE: 1
SORE THROAT: 0
SHORTNESS OF BREATH: 0
CONSTIPATION: 0
EYE PAIN: 0
ABDOMINAL PAIN: 0
VOMITING: 0
COUGH: 0
NAUSEA: 0
BLOOD IN STOOL: 0
RESPIRATORY NEGATIVE: 1

## 2023-03-08 DIAGNOSIS — M54.42 CHRONIC BILATERAL LOW BACK PAIN WITH BILATERAL SCIATICA: ICD-10-CM

## 2023-03-08 DIAGNOSIS — C90.30 SOLITARY PLASMACYTOMA OF BONE (HCC): Primary | ICD-10-CM

## 2023-03-08 DIAGNOSIS — M54.41 CHRONIC BILATERAL LOW BACK PAIN WITH BILATERAL SCIATICA: ICD-10-CM

## 2023-03-08 DIAGNOSIS — G89.29 CHRONIC BILATERAL LOW BACK PAIN WITH BILATERAL SCIATICA: ICD-10-CM

## 2023-03-10 ENCOUNTER — HOSPITAL ENCOUNTER (OUTPATIENT)
Dept: WOMENS IMAGING | Age: 58
Discharge: HOME | End: 2023-03-10
Payer: COMMERCIAL

## 2023-03-10 DIAGNOSIS — Z12.31 SCREENING MAMMOGRAM FOR BREAST CANCER: ICD-10-CM

## 2023-03-10 PROCEDURE — 77067 SCR MAMMO BI INCL CAD: CPT

## 2023-05-02 ENCOUNTER — OFFICE VISIT (OUTPATIENT)
Dept: UROLOGY | Age: 58
End: 2023-05-02
Payer: MEDICARE

## 2023-05-02 VITALS — TEMPERATURE: 98.1 F | BODY MASS INDEX: 38.21 KG/M2 | HEIGHT: 60 IN | WEIGHT: 194.6 LBS

## 2023-05-02 DIAGNOSIS — R39.15 URGENCY OF URINATION: ICD-10-CM

## 2023-05-02 DIAGNOSIS — R35.0 FREQUENCY OF MICTURITION: ICD-10-CM

## 2023-05-02 DIAGNOSIS — N39.46 MIXED INCONTINENCE URGE AND STRESS: ICD-10-CM

## 2023-05-02 LAB
BACTERIA URINE, POC: 0
BILIRUBIN URINE: 1 MG/DL
BLOOD, URINE: POSITIVE
CASTS URINE, POC: 0
CLARITY: ABNORMAL
COLOR: ABNORMAL
CRYSTALS URINE, POC: 0
EPI CELLS URINE, POC: ABNORMAL
GLUCOSE URINE: ABNORMAL
KETONES, URINE: POSITIVE
LEUKOCYTE EST, POC: ABNORMAL
NITRITE, URINE: NEGATIVE
PH UA: 5.5 (ref 4.5–8)
PROTEIN UA: POSITIVE
RBC URINE, POC: 1
SPECIFIC GRAVITY UA: 1.03 (ref 1–1.03)
UROBILINOGEN, URINE: NORMAL
WBC URINE, POC: 0
YEAST URINE, POC: 0

## 2023-05-02 PROCEDURE — G8427 DOCREV CUR MEDS BY ELIG CLIN: HCPCS | Performed by: NURSE PRACTITIONER

## 2023-05-02 PROCEDURE — 99213 OFFICE O/P EST LOW 20 MIN: CPT | Performed by: NURSE PRACTITIONER

## 2023-05-02 PROCEDURE — 3017F COLORECTAL CA SCREEN DOC REV: CPT | Performed by: NURSE PRACTITIONER

## 2023-05-02 PROCEDURE — 81001 URINALYSIS AUTO W/SCOPE: CPT | Performed by: NURSE PRACTITIONER

## 2023-05-02 PROCEDURE — G8417 CALC BMI ABV UP PARAM F/U: HCPCS | Performed by: NURSE PRACTITIONER

## 2023-05-02 PROCEDURE — 4004F PT TOBACCO SCREEN RCVD TLK: CPT | Performed by: NURSE PRACTITIONER

## 2023-05-02 RX ORDER — OXYCODONE HYDROCHLORIDE 30 MG/1
TABLET ORAL
COMMUNITY
Start: 2023-04-28

## 2023-05-02 RX ORDER — MORPHINE SULFATE 60 MG/1
TABLET, FILM COATED, EXTENDED RELEASE ORAL
COMMUNITY
Start: 2023-04-28

## 2023-05-02 RX ORDER — MORPHINE SULFATE 15 MG/1
TABLET, FILM COATED, EXTENDED RELEASE ORAL
COMMUNITY
Start: 2023-04-28

## 2023-05-02 RX ORDER — MEMANTINE HYDROCHLORIDE 5 MG/1
5 TABLET ORAL 2 TIMES DAILY
COMMUNITY
Start: 2023-04-15

## 2023-05-02 ASSESSMENT — ENCOUNTER SYMPTOMS
BACK PAIN: 1
ABDOMINAL PAIN: 0
VOMITING: 0
ABDOMINAL DISTENTION: 0
NAUSEA: 0

## 2023-05-02 NOTE — PROGRESS NOTES
Trey Donovan is a 62 y.o., female, Established patient who presents today   Chief Complaint   Patient presents with    Follow-up     I am here today for my 3 month follow up       HPI   Patient presents for follow-up of lower urinary tract symptoms. She complains of urgency, urge incontinence, stress incontinence, frequency,..  She is previously failed anticholinergic therapy secondary to severe dry mouth, constipation although the medication was effective. She is currently maintained on Myrbetriq 50 mg and reports although she is experiencing some dry eyes and dry mouth, this is much more tolerable than previous therapy with anticholinergics. She reports she still having nocturia, she does have a high fluid intake and does not sleep well at night secondary to back pain. She is being evaluated pain management    Of note, patient does have a previously identified small right renal cyst, Bosniak type I.  I did review images from her most recent CT in July 2022. REVIEW OF SYSTEMS:  Review of Systems   Constitutional:  Negative for chills and fever. Gastrointestinal:  Negative for abdominal distention, abdominal pain, nausea and vomiting. Genitourinary:  Positive for frequency and urgency. Negative for difficulty urinating, dysuria, flank pain and hematuria. Musculoskeletal:  Positive for back pain (Brace in place). Negative for gait problem. Psychiatric/Behavioral:  Negative for agitation and confusion. PHYSICAL EXAM:  Temp 98.1 °F (36.7 °C) (Temporal)   Ht 5' (1.524 m)   Wt 194 lb 9.6 oz (88.3 kg)   BMI 38.01 kg/m²   Physical Exam  Vitals and nursing note reviewed. Constitutional:       General: She is not in acute distress. Appearance: Normal appearance. She is not ill-appearing. Pulmonary:      Effort: Pulmonary effort is normal. No respiratory distress. Abdominal:      General: There is no distension. Tenderness: There is no abdominal tenderness.  There is no right CVA

## 2023-05-07 DIAGNOSIS — I10 PRIMARY HYPERTENSION: ICD-10-CM

## 2023-05-07 DIAGNOSIS — E78.5 HYPERLIPIDEMIA, UNSPECIFIED HYPERLIPIDEMIA TYPE: ICD-10-CM

## 2023-05-08 RX ORDER — LOSARTAN POTASSIUM 50 MG/1
TABLET ORAL
Qty: 90 TABLET | Refills: 0 | Status: SHIPPED | OUTPATIENT
Start: 2023-05-08

## 2023-05-08 RX ORDER — SIMVASTATIN 20 MG
TABLET ORAL
Qty: 90 TABLET | Refills: 0 | Status: SHIPPED | OUTPATIENT
Start: 2023-05-08

## 2023-08-02 ENCOUNTER — TELEPHONE (OUTPATIENT)
Dept: FAMILY MEDICINE CLINIC | Age: 58
End: 2023-08-02

## 2023-08-02 NOTE — TELEPHONE ENCOUNTER
Left voicemail for Adi Aguilar offering patient an appointment later today or Friday. Also informed her the patient does have an appointment scheduled to establish care with Cintia Landrum on 8/11/23.

## 2023-08-02 NOTE — TELEPHONE ENCOUNTER
Helen Robles with [address] called stating she did a home visit with the patient and she scored high on her depression screening. She states she thinks she would be better off dead but has no specific plans or denies suicidal thoughts. Please advise on course of action you recommend. Looks like patient has an appointment to establish care with Jessa Reina on 8/11/23.

## 2023-08-11 ENCOUNTER — TELEPHONE (OUTPATIENT)
Dept: FAMILY MEDICINE CLINIC | Age: 58
End: 2023-08-11

## 2023-08-11 ENCOUNTER — OFFICE VISIT (OUTPATIENT)
Dept: FAMILY MEDICINE CLINIC | Age: 58
End: 2023-08-11
Payer: MEDICARE

## 2023-08-11 VITALS
HEART RATE: 89 BPM | WEIGHT: 190.8 LBS | BODY MASS INDEX: 37.46 KG/M2 | DIASTOLIC BLOOD PRESSURE: 62 MMHG | TEMPERATURE: 97.4 F | HEIGHT: 60 IN | SYSTOLIC BLOOD PRESSURE: 110 MMHG | OXYGEN SATURATION: 97 %

## 2023-08-11 DIAGNOSIS — E11.9 TYPE 2 DIABETES MELLITUS WITHOUT COMPLICATION, WITHOUT LONG-TERM CURRENT USE OF INSULIN (HCC): Primary | ICD-10-CM

## 2023-08-11 DIAGNOSIS — F41.9 ANXIETY AND DEPRESSION: Primary | ICD-10-CM

## 2023-08-11 DIAGNOSIS — F32.A ANXIETY AND DEPRESSION: Primary | ICD-10-CM

## 2023-08-11 PROCEDURE — G8417 CALC BMI ABV UP PARAM F/U: HCPCS | Performed by: NURSE PRACTITIONER

## 2023-08-11 PROCEDURE — 3017F COLORECTAL CA SCREEN DOC REV: CPT | Performed by: NURSE PRACTITIONER

## 2023-08-11 PROCEDURE — 3074F SYST BP LT 130 MM HG: CPT | Performed by: NURSE PRACTITIONER

## 2023-08-11 PROCEDURE — 99213 OFFICE O/P EST LOW 20 MIN: CPT | Performed by: NURSE PRACTITIONER

## 2023-08-11 PROCEDURE — G8427 DOCREV CUR MEDS BY ELIG CLIN: HCPCS | Performed by: NURSE PRACTITIONER

## 2023-08-11 PROCEDURE — 3078F DIAST BP <80 MM HG: CPT | Performed by: NURSE PRACTITIONER

## 2023-08-11 PROCEDURE — 4004F PT TOBACCO SCREEN RCVD TLK: CPT | Performed by: NURSE PRACTITIONER

## 2023-08-11 RX ORDER — BLOOD-GLUCOSE METER
1 KIT MISCELLANEOUS DAILY
Qty: 1 KIT | Refills: 0 | Status: SHIPPED | OUTPATIENT
Start: 2023-08-11

## 2023-08-11 RX ORDER — BUPROPION HYDROCHLORIDE 150 MG/1
150 TABLET ORAL EVERY MORNING
Qty: 30 TABLET | Refills: 2 | Status: SHIPPED | OUTPATIENT
Start: 2023-08-11

## 2023-08-11 SDOH — HEALTH STABILITY: PHYSICAL HEALTH
ON AVERAGE, HOW MANY DAYS PER WEEK DO YOU ENGAGE IN MODERATE TO STRENUOUS EXERCISE (LIKE A BRISK WALK)?: PATIENT DECLINED

## 2023-08-11 NOTE — PROGRESS NOTES
[employer]. Although every effort was made to ensure the accuracy of this automated transcription, some errors in transcription may have occurred.

## 2023-08-11 NOTE — TELEPHONE ENCOUNTER
Burt Hester called to request a refill on her medication.       Last office visit : 2023   Next office visit : 2023     Requested Prescriptions     Pending Prescriptions Disp Refills    glucose monitoring (FREESTYLE FREEDOM) kit 1 kit 0     Si kit by Does not apply route daily            Kevin Ear, CMA

## 2023-08-21 DIAGNOSIS — R35.0 FREQUENCY OF MICTURITION: ICD-10-CM

## 2023-08-21 DIAGNOSIS — E78.5 HYPERLIPIDEMIA, UNSPECIFIED HYPERLIPIDEMIA TYPE: ICD-10-CM

## 2023-08-21 DIAGNOSIS — R39.15 URGENCY OF URINATION: ICD-10-CM

## 2023-08-21 DIAGNOSIS — M25.551 PAIN OF RIGHT HIP JOINT: ICD-10-CM

## 2023-08-21 DIAGNOSIS — B36.9 FUNGAL DERMATITIS: ICD-10-CM

## 2023-08-21 DIAGNOSIS — N39.46 MIXED INCONTINENCE URGE AND STRESS: ICD-10-CM

## 2023-08-21 DIAGNOSIS — G89.29 CHRONIC RIGHT-SIDED LOW BACK PAIN WITH RIGHT-SIDED SCIATICA: ICD-10-CM

## 2023-08-21 DIAGNOSIS — I10 PRIMARY HYPERTENSION: ICD-10-CM

## 2023-08-21 DIAGNOSIS — M54.41 CHRONIC RIGHT-SIDED LOW BACK PAIN WITH RIGHT-SIDED SCIATICA: ICD-10-CM

## 2023-08-21 RX ORDER — TIZANIDINE 4 MG/1
4 TABLET ORAL EVERY 8 HOURS PRN
Qty: 90 TABLET | Refills: 5 | Status: SHIPPED | OUTPATIENT
Start: 2023-08-21

## 2023-08-21 RX ORDER — CLOTRIMAZOLE AND BETAMETHASONE DIPROPIONATE 10; .64 MG/G; MG/G
CREAM TOPICAL
Qty: 45 G | Refills: 5 | Status: SHIPPED | OUTPATIENT
Start: 2023-08-21

## 2023-08-21 RX ORDER — LOSARTAN POTASSIUM 50 MG/1
50 TABLET ORAL DAILY
Qty: 90 TABLET | Refills: 0 | Status: SHIPPED | OUTPATIENT
Start: 2023-08-21

## 2023-08-21 RX ORDER — SIMVASTATIN 20 MG
20 TABLET ORAL NIGHTLY
Qty: 90 TABLET | Refills: 0 | Status: SHIPPED | OUTPATIENT
Start: 2023-08-21

## 2023-08-30 ENCOUNTER — TRANSCRIBE ORDERS (OUTPATIENT)
Dept: ADMINISTRATIVE | Facility: HOSPITAL | Age: 58
End: 2023-08-30
Payer: COMMERCIAL

## 2023-08-30 ENCOUNTER — HOSPITAL ENCOUNTER (OUTPATIENT)
Dept: GENERAL RADIOLOGY | Facility: HOSPITAL | Age: 58
Discharge: HOME | End: 2023-08-30
Admitting: PAIN MEDICINE
Payer: MEDICARE

## 2023-08-30 DIAGNOSIS — Z79.891 ENCOUNTER FOR LONG-TERM METHADONE USE: ICD-10-CM

## 2023-08-30 DIAGNOSIS — M47.816 LUMBAR SPONDYLOSIS: Primary | ICD-10-CM

## 2023-08-30 DIAGNOSIS — F17.200 TOBACCO USE DISORDER: ICD-10-CM

## 2023-08-30 DIAGNOSIS — C90.31 SOLITARY PLASMACYTOMA IN REMISSION: ICD-10-CM

## 2023-08-30 DIAGNOSIS — G89.29 OTHER CHRONIC PAIN: ICD-10-CM

## 2023-08-30 DIAGNOSIS — M47.816 LUMBAR SPONDYLOSIS: ICD-10-CM

## 2023-08-30 PROCEDURE — 72110 X-RAY EXAM L-2 SPINE 4/>VWS: CPT

## 2023-09-01 RX ORDER — MEMANTINE HYDROCHLORIDE 5 MG/1
5 TABLET ORAL 2 TIMES DAILY
Qty: 180 TABLET | Refills: 0 | Status: SHIPPED | OUTPATIENT
Start: 2023-09-01 | End: 2023-11-30

## 2023-09-01 NOTE — TELEPHONE ENCOUNTER
She last saw Dr Piper Lao in 2022 it appears. I see he is listed as PCP. I am more than happy to refill this med.

## 2023-09-01 NOTE — TELEPHONE ENCOUNTER
Patient still needs to establish with Dr. Xu Fox for controlled medications but has asked if Cintia can refill the non-controlled till she can come in and see him. Pharmacy called to request a refill on her medication.       Last office visit : 8/11/2023   Next office visit : 9/8/2023     Requested Prescriptions     Pending Prescriptions Disp Refills    memantine (NAMENDA) 5 MG tablet 180 tablet 0     Sig: Take 1 tablet by mouth 2 times daily            Hailey Moran, CMA

## 2023-09-08 ENCOUNTER — HOSPITAL ENCOUNTER (OUTPATIENT)
Dept: GENERAL RADIOLOGY | Age: 58
Discharge: HOME | End: 2023-09-08
Payer: MEDICARE

## 2023-09-08 ENCOUNTER — OFFICE VISIT (OUTPATIENT)
Dept: FAMILY MEDICINE CLINIC | Age: 58
End: 2023-09-08
Payer: MEDICARE

## 2023-09-08 VITALS
WEIGHT: 187 LBS | HEIGHT: 60 IN | SYSTOLIC BLOOD PRESSURE: 118 MMHG | BODY MASS INDEX: 36.71 KG/M2 | RESPIRATION RATE: 20 BRPM | HEART RATE: 68 BPM | DIASTOLIC BLOOD PRESSURE: 72 MMHG | TEMPERATURE: 96.5 F | OXYGEN SATURATION: 97 %

## 2023-09-08 DIAGNOSIS — E78.5 HYPERLIPIDEMIA, UNSPECIFIED HYPERLIPIDEMIA TYPE: ICD-10-CM

## 2023-09-08 DIAGNOSIS — M25.552 LEFT HIP PAIN: ICD-10-CM

## 2023-09-08 DIAGNOSIS — M54.32 SCIATICA, LEFT SIDE: ICD-10-CM

## 2023-09-08 DIAGNOSIS — E11.9 TYPE 2 DIABETES MELLITUS WITHOUT COMPLICATION, WITHOUT LONG-TERM CURRENT USE OF INSULIN (HCC): ICD-10-CM

## 2023-09-08 DIAGNOSIS — F41.9 ANXIETY AND DEPRESSION: Primary | ICD-10-CM

## 2023-09-08 DIAGNOSIS — F32.A ANXIETY AND DEPRESSION: Primary | ICD-10-CM

## 2023-09-08 LAB
ALBUMIN SERPL-MCNC: 4.5 G/DL (ref 3.5–5.2)
ALP SERPL-CCNC: 91 U/L (ref 35–104)
ALT SERPL-CCNC: 19 U/L (ref 5–33)
ANION GAP SERPL CALCULATED.3IONS-SCNC: 8 MMOL/L (ref 7–19)
AST SERPL-CCNC: 13 U/L (ref 5–32)
BACTERIA #/AREA URNS HPF: ABNORMAL /HPF
BASOPHILS # BLD: 0.1 K/UL (ref 0–0.2)
BASOPHILS NFR BLD: 0.8 % (ref 0–1)
BILIRUB SERPL-MCNC: 0.3 MG/DL (ref 0.2–1.2)
BILIRUB UR QL STRIP: ABNORMAL
BUN SERPL-MCNC: 14 MG/DL (ref 6–20)
CALCIUM SERPL-MCNC: 9.5 MG/DL (ref 8.6–10)
CHLORIDE SERPL-SCNC: 104 MMOL/L (ref 98–111)
CHOLEST SERPL-MCNC: 124 MG/DL (ref 160–199)
CLARITY UR: ABNORMAL
CO2 SERPL-SCNC: 27 MMOL/L (ref 22–29)
COLOR UR: ABNORMAL
CREAT SERPL-MCNC: 0.9 MG/DL (ref 0.5–0.9)
CREAT UR-MCNC: 387.7 MG/DL (ref 28–217)
EOSINOPHIL # BLD: 0.5 K/UL (ref 0–0.6)
EOSINOPHIL NFR BLD: 7.5 % (ref 0–5)
ERYTHROCYTE [DISTWIDTH] IN BLOOD BY AUTOMATED COUNT: 13 % (ref 11.5–14.5)
GLUCOSE SERPL-MCNC: 220 MG/DL (ref 74–109)
GLUCOSE UR STRIP.AUTO-MCNC: 500 MG/DL
HBA1C MFR BLD: 9.1 % (ref 4–6)
HCT VFR BLD AUTO: 47.2 % (ref 37–47)
HDLC SERPL-MCNC: 38 MG/DL (ref 65–121)
HGB BLD-MCNC: 15.3 G/DL (ref 12–16)
HGB UR STRIP.AUTO-MCNC: NEGATIVE MG/L
HYALINE CASTS #/AREA URNS LPF: ABNORMAL /LPF (ref 0–5)
IMM GRANULOCYTES # BLD: 0 K/UL
KETONES UR STRIP.AUTO-MCNC: ABNORMAL MG/DL
LDLC SERPL CALC-MCNC: 42 MG/DL
LEUKOCYTE ESTERASE UR QL STRIP.AUTO: ABNORMAL
LYMPHOCYTES # BLD: 1.9 K/UL (ref 1.1–4.5)
LYMPHOCYTES NFR BLD: 28.4 % (ref 20–40)
MCH RBC QN AUTO: 31 PG (ref 27–31)
MCHC RBC AUTO-ENTMCNC: 32.4 G/DL (ref 33–37)
MCV RBC AUTO: 95.7 FL (ref 81–99)
MICROALBUMIN UR-MCNC: 4.1 MG/DL (ref 0–19)
MICROALBUMIN/CREAT UR-RTO: 10.6 MG/G
MONOCYTES # BLD: 0.4 K/UL (ref 0–0.9)
MONOCYTES NFR BLD: 6.1 % (ref 0–10)
NEUTROPHILS # BLD: 3.7 K/UL (ref 1.5–7.5)
NEUTS SEG NFR BLD: 56.6 % (ref 50–65)
NITRITE UR QL STRIP.AUTO: NEGATIVE
PH UR STRIP.AUTO: 5.5 [PH] (ref 5–8)
PLATELET # BLD AUTO: 197 K/UL (ref 130–400)
PMV BLD AUTO: 9.8 FL (ref 9.4–12.3)
POTASSIUM SERPL-SCNC: 4.7 MMOL/L (ref 3.5–5)
PROT SERPL-MCNC: 7.6 G/DL (ref 6.6–8.7)
PROT UR STRIP.AUTO-MCNC: 30 MG/DL
RBC # BLD AUTO: 4.93 M/UL (ref 4.2–5.4)
RBC #/AREA URNS HPF: ABNORMAL /HPF (ref 0–2)
SODIUM SERPL-SCNC: 139 MMOL/L (ref 136–145)
SP GR UR STRIP.AUTO: 1.03 (ref 1–1.03)
SQUAMOUS #/AREA URNS HPF: ABNORMAL /HPF
TRIGL SERPL-MCNC: 221 MG/DL (ref 0–149)
UROBILINOGEN UR STRIP.AUTO-MCNC: 1 E.U./DL
WBC # BLD AUTO: 6.5 K/UL (ref 4.8–10.8)
WBC #/AREA URNS HPF: ABNORMAL /HPF (ref 0–5)

## 2023-09-08 PROCEDURE — 99214 OFFICE O/P EST MOD 30 MIN: CPT | Performed by: NURSE PRACTITIONER

## 2023-09-08 PROCEDURE — 3078F DIAST BP <80 MM HG: CPT | Performed by: NURSE PRACTITIONER

## 2023-09-08 PROCEDURE — 3046F HEMOGLOBIN A1C LEVEL >9.0%: CPT | Performed by: NURSE PRACTITIONER

## 2023-09-08 PROCEDURE — 73502 X-RAY EXAM HIP UNI 2-3 VIEWS: CPT

## 2023-09-08 PROCEDURE — 3074F SYST BP LT 130 MM HG: CPT | Performed by: NURSE PRACTITIONER

## 2023-09-08 RX ORDER — CLONIDINE HYDROCHLORIDE 0.1 MG/1
TABLET ORAL
COMMUNITY
Start: 2023-08-30

## 2023-09-08 RX ORDER — NALOXONE HYDROCHLORIDE 4 MG/.1ML
SPRAY NASAL
COMMUNITY
Start: 2023-08-30

## 2023-09-08 RX ORDER — BUPROPION HYDROCHLORIDE 150 MG/1
150 TABLET ORAL EVERY MORNING
Qty: 30 TABLET | Refills: 4 | Status: SHIPPED | OUTPATIENT
Start: 2023-09-08

## 2023-09-08 RX ORDER — PROMETHAZINE HYDROCHLORIDE 25 MG/1
TABLET ORAL
COMMUNITY
Start: 2023-08-24

## 2023-09-08 ASSESSMENT — ENCOUNTER SYMPTOMS
BACK PAIN: 1
RESPIRATORY NEGATIVE: 1
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
## 2023-09-14 ENCOUNTER — OFFICE VISIT (OUTPATIENT)
Dept: FAMILY MEDICINE CLINIC | Age: 58
End: 2023-09-14
Payer: MEDICARE

## 2023-09-14 ENCOUNTER — TELEPHONE (OUTPATIENT)
Dept: FAMILY MEDICINE CLINIC | Age: 58
End: 2023-09-14

## 2023-09-14 VITALS
OXYGEN SATURATION: 96 % | WEIGHT: 188 LBS | DIASTOLIC BLOOD PRESSURE: 72 MMHG | SYSTOLIC BLOOD PRESSURE: 130 MMHG | TEMPERATURE: 96.8 F | HEART RATE: 81 BPM | BODY MASS INDEX: 36.72 KG/M2

## 2023-09-14 DIAGNOSIS — E78.5 HYPERLIPIDEMIA, UNSPECIFIED HYPERLIPIDEMIA TYPE: ICD-10-CM

## 2023-09-14 DIAGNOSIS — E11.9 TYPE 2 DIABETES MELLITUS WITHOUT COMPLICATION, WITHOUT LONG-TERM CURRENT USE OF INSULIN (HCC): Primary | ICD-10-CM

## 2023-09-14 DIAGNOSIS — I10 PRIMARY HYPERTENSION: ICD-10-CM

## 2023-09-14 PROCEDURE — 3046F HEMOGLOBIN A1C LEVEL >9.0%: CPT | Performed by: FAMILY MEDICINE

## 2023-09-14 PROCEDURE — 99214 OFFICE O/P EST MOD 30 MIN: CPT | Performed by: FAMILY MEDICINE

## 2023-09-14 PROCEDURE — 3078F DIAST BP <80 MM HG: CPT | Performed by: FAMILY MEDICINE

## 2023-09-14 PROCEDURE — 3075F SYST BP GE 130 - 139MM HG: CPT | Performed by: FAMILY MEDICINE

## 2023-09-14 RX ORDER — SEMAGLUTIDE 1.34 MG/ML
INJECTION, SOLUTION SUBCUTANEOUS
Qty: 1 ADJUSTABLE DOSE PRE-FILLED PEN SYRINGE | Refills: 5 | Status: SHIPPED | OUTPATIENT
Start: 2023-09-14

## 2023-09-14 ASSESSMENT — ENCOUNTER SYMPTOMS
ALLERGIC/IMMUNOLOGIC NEGATIVE: 1
RESPIRATORY NEGATIVE: 1
GASTROINTESTINAL NEGATIVE: 1
EYES NEGATIVE: 1

## 2023-09-14 NOTE — TELEPHONE ENCOUNTER
Unable to reach patient, no answer. LVM for patient with provider notes as noted below. Patient instructed to call the office if she has any questions or concerns.

## 2023-09-14 NOTE — TELEPHONE ENCOUNTER
----- Message from AURY Bloom sent at 9/14/2023  8:39 AM CDT -----  Xray hip is showing good spacing. Follow up as needed related to this.

## 2023-09-19 DIAGNOSIS — I10 PRIMARY HYPERTENSION: ICD-10-CM

## 2023-09-19 DIAGNOSIS — E78.5 HYPERLIPIDEMIA, UNSPECIFIED HYPERLIPIDEMIA TYPE: ICD-10-CM

## 2023-09-20 ENCOUNTER — TELEPHONE (OUTPATIENT)
Dept: HEMATOLOGY | Age: 58
End: 2023-09-20

## 2023-09-20 RX ORDER — SIMVASTATIN 20 MG
20 TABLET ORAL
Qty: 90 TABLET | Refills: 3 | Status: SHIPPED | OUTPATIENT
Start: 2023-09-20

## 2023-09-20 RX ORDER — LOSARTAN POTASSIUM 50 MG/1
50 TABLET ORAL DAILY
Qty: 90 TABLET | Refills: 3 | Status: SHIPPED | OUTPATIENT
Start: 2023-09-20

## 2023-09-20 NOTE — PROGRESS NOTES
disease. She has yet to meet CRAB criteria for multiple myeloma. Myeloma studies were last evaluated on 4/28/2023 at [facility] were within acceptable limits. I reviewed the following findings with the completed since her previous follow-up appointment:  04/28/2023 Serology results ([place])  IgG 1013, IgA 200, IgM 69  Kappa light chains 2.27  Lambda light chains 1.95  Kappa/Lambda ratio 1.16  No monoclonal protein identified by kappa/lambda immunofixation electrophoresis. -CBC today  -Recommend yearly imaging to include MRI of the thoracic and lumbar spine annually per NCCN guidelines, schedule imaging today  -Myeloma studies and CMP today  -Keep follow up with Dr Gabriel Fowler on 10/25/2023, also anticipating a skeletal survey study that day    NCCN guidelines for surveillance of solitary plasmacytoma:  H&P every 3 to 6 months with CBC and CMP  Myeloma studies, LDH and beta-2 microglobulin as clinically indicated  Bone marrow aspiration biopsy as clinically indicated  Yearly imaging with the same technique used to diagnosis for at least 5 years    2. Chronic bilateral low back pain with bilateral sciatica, with history of broken vertebrae and fractures with deformity. Continues to be managed by [place] pain [facility]. Reports currently weaning off of morphine and currently not wearing a back brace. At previous follow-up appointment a prescription was provided for a new back brace  -Continue to follow with orthopedic clinic and pain management as recommended. 3.  Tobacco abuse, reports smoking < 1/2 pack/day  We talked about the importance of quitting smoking for approximately 4-5 minutes. Specifically, we discussed the risk related tobacco including but not limited to carcinoma, cardiovascular disease, stroke, and financial loss. I advised to quit smoking and offered resources to include nicotine patches to help with the craving. The patient is contemplated at this time.  I will follow-up on

## 2023-09-20 NOTE — TELEPHONE ENCOUNTER
Conversation held regarding [facility] network changes effective 10/1/2023. Manfred Stanley will be changing insurances during open enrollment 10/15/2023, and reaching out to additional [facility] providers to request Continuity of care letter.

## 2023-09-21 ENCOUNTER — OFFICE VISIT (OUTPATIENT)
Dept: HEMATOLOGY | Age: 58
End: 2023-09-21
Payer: MEDICARE

## 2023-09-21 ENCOUNTER — HOSPITAL ENCOUNTER (OUTPATIENT)
Dept: INFUSION THERAPY | Age: 58
Discharge: HOME | End: 2023-09-21
Payer: MEDICARE

## 2023-09-21 VITALS
HEART RATE: 85 BPM | WEIGHT: 187 LBS | OXYGEN SATURATION: 94 % | HEIGHT: 60 IN | BODY MASS INDEX: 36.71 KG/M2 | SYSTOLIC BLOOD PRESSURE: 98 MMHG | DIASTOLIC BLOOD PRESSURE: 64 MMHG

## 2023-09-21 DIAGNOSIS — C90.30 SOLITARY PLASMACYTOMA OF BONE (HCC): ICD-10-CM

## 2023-09-21 DIAGNOSIS — C90.30 SOLITARY PLASMACYTOMA OF BONE (HCC): Primary | ICD-10-CM

## 2023-09-21 DIAGNOSIS — Z72.0 TOBACCO ABUSE: ICD-10-CM

## 2023-09-21 DIAGNOSIS — M54.41 CHRONIC BILATERAL LOW BACK PAIN WITH BILATERAL SCIATICA: ICD-10-CM

## 2023-09-21 DIAGNOSIS — G89.29 CHRONIC BILATERAL LOW BACK PAIN WITH BILATERAL SCIATICA: ICD-10-CM

## 2023-09-21 DIAGNOSIS — M54.42 CHRONIC BILATERAL LOW BACK PAIN WITH BILATERAL SCIATICA: ICD-10-CM

## 2023-09-21 LAB
ALBUMIN SERPL-MCNC: 4.9 G/DL (ref 3.5–5.2)
ALP SERPL-CCNC: 88 U/L (ref 35–104)
ALT SERPL-CCNC: 32 U/L (ref 9–52)
ANION GAP SERPL CALCULATED.3IONS-SCNC: 11 MMOL/L (ref 7–19)
AST SERPL-CCNC: 25 U/L (ref 14–36)
BASOPHILS # BLD: 0.03 K/UL (ref 0.01–0.08)
BASOPHILS NFR BLD: 0.5 % (ref 0.1–1.2)
BILIRUB SERPL-MCNC: 0.6 MG/DL (ref 0.2–1.3)
BUN SERPL-MCNC: 18 MG/DL (ref 7–17)
CALCIUM SERPL-MCNC: 9.8 MG/DL (ref 8.4–10.2)
CHLORIDE SERPL-SCNC: 101 MMOL/L (ref 98–111)
CO2 SERPL-SCNC: 27 MMOL/L (ref 22–29)
CREAT SERPL-MCNC: 0.9 MG/DL (ref 0.5–1)
EOSINOPHIL # BLD: 0.37 K/UL (ref 0.04–0.54)
EOSINOPHIL NFR BLD: 6.6 % (ref 0.7–7)
ERYTHROCYTE [DISTWIDTH] IN BLOOD BY AUTOMATED COUNT: 12.8 % (ref 11.7–14.4)
GLOBULIN: 3.4 G/DL
GLUCOSE SERPL-MCNC: 281 MG/DL (ref 74–106)
HCT VFR BLD AUTO: 44.9 % (ref 34.1–44.9)
HGB BLD-MCNC: 15.3 G/DL (ref 11.2–15.7)
LYMPHOCYTES # BLD: 1.62 K/UL (ref 1.18–3.74)
LYMPHOCYTES NFR BLD: 28.8 % (ref 19.3–53.1)
MCH RBC QN AUTO: 30.2 PG (ref 25.6–32.2)
MCHC RBC AUTO-ENTMCNC: 34.1 G/DL (ref 32.3–35.5)
MCV RBC AUTO: 88.7 FL (ref 79.4–94.8)
MONOCYTES # BLD: 0.32 K/UL (ref 0.24–0.82)
MONOCYTES NFR BLD: 5.7 % (ref 4.7–12.5)
NEUTROPHILS # BLD: 3.26 K/UL (ref 1.56–6.13)
NEUTS SEG NFR BLD: 58 % (ref 34–71.1)
PLATELET # BLD AUTO: 173 K/UL (ref 182–369)
PMV BLD AUTO: 9.4 FL (ref 7.4–10.4)
POTASSIUM SERPL-SCNC: 4.6 MMOL/L (ref 3.5–5.1)
PROT SERPL-MCNC: 8.3 G/DL (ref 6.3–8.2)
RBC # BLD AUTO: 5.06 M/UL (ref 3.93–5.22)
SODIUM SERPL-SCNC: 139 MMOL/L (ref 137–145)
WBC # BLD AUTO: 5.62 K/UL (ref 3.98–10.04)

## 2023-09-21 PROCEDURE — 99213 OFFICE O/P EST LOW 20 MIN: CPT | Performed by: NURSE PRACTITIONER

## 2023-09-21 PROCEDURE — 36415 COLL VENOUS BLD VENIPUNCTURE: CPT

## 2023-09-21 PROCEDURE — 99212 OFFICE O/P EST SF 10 MIN: CPT

## 2023-09-21 PROCEDURE — 85025 COMPLETE CBC W/AUTO DIFF WBC: CPT

## 2023-09-21 PROCEDURE — 80053 COMPREHEN METABOLIC PANEL: CPT

## 2023-09-21 PROCEDURE — 3078F DIAST BP <80 MM HG: CPT | Performed by: NURSE PRACTITIONER

## 2023-09-21 PROCEDURE — 3074F SYST BP LT 130 MM HG: CPT | Performed by: NURSE PRACTITIONER

## 2023-09-22 ENCOUNTER — PATIENT MESSAGE (OUTPATIENT)
Dept: FAMILY MEDICINE CLINIC | Age: 58
End: 2023-09-22

## 2023-09-23 LAB — B2 MICROGLOB SERPL-MCNC: 2.9 MG/L (ref 0.8–2.4)

## 2023-09-25 ENCOUNTER — OFFICE VISIT (OUTPATIENT)
Dept: OBGYN CLINIC | Age: 58
End: 2023-09-25
Payer: MEDICARE

## 2023-09-25 VITALS
DIASTOLIC BLOOD PRESSURE: 67 MMHG | HEIGHT: 60 IN | WEIGHT: 187 LBS | BODY MASS INDEX: 36.71 KG/M2 | SYSTOLIC BLOOD PRESSURE: 117 MMHG | HEART RATE: 84 BPM

## 2023-09-25 DIAGNOSIS — Z01.419 ENCOUNTER FOR ROUTINE GYNECOLOGIC EXAMINATION IN MEDICARE PATIENT: Primary | ICD-10-CM

## 2023-09-25 LAB
ALBUMIN SERPL-MCNC: 4.65 G/DL (ref 3.75–5.01)
ALPHA1 GLOB SERPL ELPH-MCNC: 0.32 G/DL (ref 0.19–0.46)
ALPHA2 GLOB SERPL ELPH-MCNC: 0.83 G/DL (ref 0.48–1.05)
B-GLOBULIN SERPL ELPH-MCNC: 0.88 G/DL (ref 0.48–1.1)
DEPRECATED KAPPA LC FREE/LAMBDA SER: 1.26 {RATIO} (ref 0.26–1.65)
EER MONOCLONAL PROTEIN AND FLC, SERUM: ABNORMAL
GAMMA GLOB SERPL ELPH-MCNC: 1.22 G/DL (ref 0.62–1.51)
IGA SERPL-MCNC: 278 MG/DL (ref 68–408)
IGG SERPL-MCNC: 1228 MG/DL (ref 768–1632)
IGM SERPL-MCNC: 142 MG/DL (ref 35–263)
INTERPRETATION SERPL IFE-IMP: ABNORMAL
INTERPRETATION SERPL IFE-IMP: ABNORMAL
KAPPA LC FREE SER-MCNC: 32.45 MG/L (ref 3.3–19.4)
LAMBDA LC FREE SERPL-MCNC: 25.82 MG/L (ref 5.71–26.3)
MONOCLONAL PROTEIN, SERUM: ABNORMAL G/DL
PROT SERPL-MCNC: 7.9 G/DL (ref 6.3–8.2)

## 2023-09-25 PROCEDURE — G0101 CA SCREEN;PELVIC/BREAST EXAM: HCPCS | Performed by: NURSE PRACTITIONER

## 2023-09-25 ASSESSMENT — ENCOUNTER SYMPTOMS
EYES NEGATIVE: 1
ALLERGIC/IMMUNOLOGIC NEGATIVE: 1
GASTROINTESTINAL NEGATIVE: 1
RESPIRATORY NEGATIVE: 1

## 2023-09-25 NOTE — PROGRESS NOTES
in late 's    Cancer Father         Lung cancer    Diabetes Sister     Miscarriages / Stillbirths Sister     Diabetes Brother     Diabetes Maternal Grandmother     Colon Cancer Paternal Grandmother     Cancer Paternal Grandmother         Colon cancer    Cancer Nephew         muscle    Cancer Nephew         leukemia    Mental Illness Paternal Aunt      Social History     Tobacco Use    Smoking status: Every Day     Packs/day: 1.00     Years: 35.00     Additional pack years: 0.00     Total pack years: 35.00     Types: Cigarettes     Last attempt to quit: 10/28/2020     Years since quittin.9    Smokeless tobacco: Former     Types: Snuff    Tobacco comments:     I have quit and restarted several times   Substance Use Topics    Alcohol use: Not Currently     Comment: socially       Current Outpatient Medications   Medication Sig Dispense Refill    losartan (COZAAR) 50 MG tablet Take 1 tablet by mouth once daily 90 tablet 3    simvastatin (ZOCOR) 20 MG tablet TAKE 1 TABLET BY MOUTH ONCE DAILY IN THE EVENING 90 tablet 3    cloNIDine (CATAPRES) 0.1 MG tablet       naloxone 4 MG/0.1ML LIQD nasal spray       promethazine (PHENERGAN) 25 MG tablet       buPROPion (WELLBUTRIN XL) 150 MG extended release tablet Take 1 tablet by mouth every morning 30 tablet 4    memantine (NAMENDA) 5 MG tablet Take 1 tablet by mouth 2 times daily 180 tablet 0    clotrimazole-betamethasone (LOTRISONE) 1-0.05 % cream Apply topically 2 times daily. Abdominal rash 45 g 5    tiZANidine (ZANAFLEX) 4 MG tablet Take 1 tablet by mouth every 8 hours as needed (muscle pain) 90 tablet 5    mirabegron (MYRBETRIQ) 50 MG TB24 Take 50 mg by mouth daily 90 tablet 3    glucose monitoring (FREESTYLE FREEDOM) kit 1 kit by Does not apply route daily 1 kit 0    morphine (MS CONTIN) 15 MG extended release tablet Take 1 tablet by mouth in the morning and at bedtime.       oxyCODONE (OXY-IR) 30 MG immediate release tablet Take 1 tablet by mouth in the morning, at

## 2023-09-27 RX ORDER — BUSPIRONE HYDROCHLORIDE 15 MG/1
15 TABLET ORAL EVERY 8 HOURS
Qty: 90 TABLET | Refills: 5 | Status: SHIPPED | OUTPATIENT
Start: 2023-09-27

## 2023-10-01 ASSESSMENT — ENCOUNTER SYMPTOMS
BACK PAIN: 1
COUGH: 0
SORE THROAT: 0
EYE REDNESS: 0
RESPIRATORY NEGATIVE: 1
GASTROINTESTINAL NEGATIVE: 1
ABDOMINAL PAIN: 0
EYES NEGATIVE: 1
CONSTIPATION: 0
SHORTNESS OF BREATH: 0
NAUSEA: 0
EYE DISCHARGE: 0
EYE PAIN: 0
BLOOD IN STOOL: 0
VOMITING: 0
DIARRHEA: 0
WHEEZING: 0

## 2023-10-03 NOTE — TELEPHONE ENCOUNTER
Shabbir Rico called to request a refill on her medication.       Last office visit : 9/14/2023   Next office visit : 12/14/2023     Requested Prescriptions     Pending Prescriptions Disp Refills    memantine (NAMENDA) 5 MG tablet [Pharmacy Med Name: Memantine HCl 5 MG Oral Tablet] 180 tablet 0     Sig: Take 1 tablet by mouth twice daily            Matt Serna, RYANN

## 2023-10-04 RX ORDER — MEMANTINE HYDROCHLORIDE 5 MG/1
5 TABLET ORAL 2 TIMES DAILY
Qty: 180 TABLET | Refills: 0 | Status: SHIPPED | OUTPATIENT
Start: 2023-10-04

## 2023-10-05 ENCOUNTER — HOSPITAL ENCOUNTER (OUTPATIENT)
Dept: GENERAL RADIOLOGY | Age: 58
Discharge: HOME | End: 2023-10-05
Payer: MEDICARE

## 2023-10-05 DIAGNOSIS — M54.6 THORACIC SPINE PAIN: ICD-10-CM

## 2023-10-05 DIAGNOSIS — E11.9 TYPE 2 DIABETES MELLITUS WITHOUT COMPLICATION, WITHOUT LONG-TERM CURRENT USE OF INSULIN (HCC): ICD-10-CM

## 2023-10-05 PROCEDURE — 72072 X-RAY EXAM THORAC SPINE 3VWS: CPT

## 2023-10-05 RX ORDER — SEMAGLUTIDE 1.34 MG/ML
INJECTION, SOLUTION SUBCUTANEOUS
Qty: 1 ADJUSTABLE DOSE PRE-FILLED PEN SYRINGE | Refills: 5 | Status: SHIPPED | OUTPATIENT
Start: 2023-10-05

## 2023-10-11 RX ORDER — LACTULOSE 10 G/15ML
10 SOLUTION ORAL 2 TIMES DAILY
Qty: 900 ML | Refills: 5 | Status: SHIPPED | OUTPATIENT
Start: 2023-10-11 | End: 2024-04-08

## 2023-10-11 NOTE — TELEPHONE ENCOUNTER
I'm so forgetful. Jorgeeliecer Campa forgotten some of them, I know, for sure, I  need to reorder another one. It's  Laluose 10M/15 ML Solu. 15ML by mouth 2 times a day. Also, I haven't gotten any  OZEMPIC yet. If, you don't mind, could you check my meds list and see if I've forgotten anymore? That way I won't be bothering you again about this. I  feel like I'm having to bother you all the time about something:(   Thank you so much for being so patient with me.  Jacqueline Huber

## 2023-11-02 ENCOUNTER — TELEPHONE (OUTPATIENT)
Dept: HEMATOLOGY | Age: 58
End: 2023-11-02

## 2023-11-02 DIAGNOSIS — M54.50 ACUTE BILATERAL LOW BACK PAIN WITHOUT SCIATICA: Primary | ICD-10-CM

## 2023-11-02 DIAGNOSIS — C90.30 SOLITARY PLASMACYTOMA OF BONE (HCC): ICD-10-CM

## 2023-11-02 DIAGNOSIS — M54.6 ACUTE BILATERAL THORACIC BACK PAIN: ICD-10-CM

## 2023-11-02 DIAGNOSIS — M54.2 ACUTE NECK PAIN: ICD-10-CM

## 2023-11-02 NOTE — TELEPHONE ENCOUNTER
----- Message from Scott Christian sent at 11/2/2023  7:23 AM CDT -----  Regarding: Cervical area  Contact: [phone]  I also forgot to tell you my glucose is very high right now so my headaches could be due to that. Been waiting on ozympic to be approved for over 2 months now. Fasting has been running over 260.

## 2023-11-02 NOTE — TELEPHONE ENCOUNTER
Called patient regarding her back pain. Informed that AURY Burns would like to get a CT of her neck and back to see what is causing the pain. Instructed that we will get it ordered and approved by insurance and someone will call her with an appointment date and time. Patient v/u and is agreeable to plan.

## 2023-11-20 ENCOUNTER — PATIENT MESSAGE (OUTPATIENT)
Dept: FAMILY MEDICINE CLINIC | Age: 58
End: 2023-11-20

## 2023-11-20 DIAGNOSIS — E11.9 TYPE 2 DIABETES MELLITUS WITHOUT COMPLICATION, WITHOUT LONG-TERM CURRENT USE OF INSULIN (HCC): Primary | ICD-10-CM

## 2023-12-13 ENCOUNTER — HOSPITAL ENCOUNTER (OUTPATIENT)
Dept: CT IMAGING | Age: 58
Discharge: HOME | End: 2023-12-13
Payer: MEDICARE

## 2023-12-13 DIAGNOSIS — M54.2 ACUTE NECK PAIN: ICD-10-CM

## 2023-12-13 DIAGNOSIS — C90.30 SOLITARY PLASMACYTOMA OF BONE (HCC): ICD-10-CM

## 2023-12-13 DIAGNOSIS — M54.50 ACUTE BILATERAL LOW BACK PAIN WITHOUT SCIATICA: ICD-10-CM

## 2023-12-13 DIAGNOSIS — M54.6 ACUTE BILATERAL THORACIC BACK PAIN: ICD-10-CM

## 2023-12-13 PROCEDURE — 72126 CT NECK SPINE W/DYE: CPT

## 2023-12-13 PROCEDURE — 72132 CT LUMBAR SPINE W/DYE: CPT

## 2023-12-13 PROCEDURE — 6360000004 HC RX CONTRAST MEDICATION: Performed by: NURSE PRACTITIONER

## 2023-12-13 PROCEDURE — 72129 CT CHEST SPINE W/DYE: CPT

## 2023-12-13 RX ADMIN — IOPAMIDOL 100 ML: 755 INJECTION, SOLUTION INTRAVENOUS at 14:52

## 2023-12-14 ENCOUNTER — OFFICE VISIT (OUTPATIENT)
Dept: FAMILY MEDICINE CLINIC | Age: 58
End: 2023-12-14
Payer: COMMERCIAL

## 2023-12-14 VITALS
OXYGEN SATURATION: 96 % | HEIGHT: 60 IN | HEART RATE: 67 BPM | TEMPERATURE: 96.9 F | SYSTOLIC BLOOD PRESSURE: 130 MMHG | DIASTOLIC BLOOD PRESSURE: 74 MMHG | BODY MASS INDEX: 37.11 KG/M2 | WEIGHT: 189 LBS

## 2023-12-14 DIAGNOSIS — E78.5 HYPERLIPIDEMIA, UNSPECIFIED HYPERLIPIDEMIA TYPE: ICD-10-CM

## 2023-12-14 DIAGNOSIS — R00.2 PALPITATION: ICD-10-CM

## 2023-12-14 DIAGNOSIS — E11.9 TYPE 2 DIABETES MELLITUS WITHOUT COMPLICATION, WITHOUT LONG-TERM CURRENT USE OF INSULIN (HCC): ICD-10-CM

## 2023-12-14 DIAGNOSIS — I10 PRIMARY HYPERTENSION: ICD-10-CM

## 2023-12-14 DIAGNOSIS — E11.9 TYPE 2 DIABETES MELLITUS WITHOUT COMPLICATION, WITHOUT LONG-TERM CURRENT USE OF INSULIN (HCC): Primary | ICD-10-CM

## 2023-12-14 LAB
ALBUMIN SERPL-MCNC: 4.3 G/DL (ref 3.5–5.2)
ALP SERPL-CCNC: 86 U/L (ref 35–104)
ALT SERPL-CCNC: 21 U/L (ref 5–33)
AMORPH SED URNS QL MICRO: ABNORMAL /HPF
ANION GAP SERPL CALCULATED.3IONS-SCNC: 10 MMOL/L (ref 7–19)
AST SERPL-CCNC: 9 U/L (ref 5–32)
BACTERIA #/AREA URNS HPF: ABNORMAL /HPF
BILIRUB SERPL-MCNC: 0.3 MG/DL (ref 0.2–1.2)
BILIRUB UR QL STRIP: NEGATIVE
BUN SERPL-MCNC: 19 MG/DL (ref 6–20)
CALCIUM SERPL-MCNC: 9.5 MG/DL (ref 8.6–10)
CHLORIDE SERPL-SCNC: 102 MMOL/L (ref 98–111)
CHOLEST SERPL-MCNC: 159 MG/DL (ref 160–199)
CLARITY UR: ABNORMAL
CO2 SERPL-SCNC: 26 MMOL/L (ref 22–29)
COLOR UR: YELLOW
CREAT SERPL-MCNC: 0.6 MG/DL (ref 0.5–0.9)
CREAT UR-MCNC: 118.6 MG/DL (ref 28–217)
ERYTHROCYTE [DISTWIDTH] IN BLOOD BY AUTOMATED COUNT: 12.5 % (ref 11.5–14.5)
GLUCOSE SERPL-MCNC: 281 MG/DL (ref 74–109)
GLUCOSE UR STRIP.AUTO-MCNC: =>1000 MG/DL
HBA1C MFR BLD: 11 % (ref 4–6)
HCT VFR BLD AUTO: 45 % (ref 37–47)
HDLC SERPL-MCNC: 46 MG/DL (ref 65–121)
HGB BLD-MCNC: 14.6 G/DL (ref 12–16)
HGB UR STRIP.AUTO-MCNC: NEGATIVE MG/L
KETONES UR STRIP.AUTO-MCNC: ABNORMAL MG/DL
LDLC SERPL CALC-MCNC: 64 MG/DL
LEUKOCYTE ESTERASE UR QL STRIP.AUTO: ABNORMAL
MCH RBC QN AUTO: 30.9 PG (ref 27–31)
MCHC RBC AUTO-ENTMCNC: 32.4 G/DL (ref 33–37)
MCV RBC AUTO: 95.1 FL (ref 81–99)
MICROALBUMIN UR-MCNC: 6.7 MG/DL (ref 0–19)
MICROALBUMIN/CREAT UR-RTO: 56.5 MG/G
NITRITE UR QL STRIP.AUTO: POSITIVE
PH UR STRIP.AUTO: 6 [PH] (ref 5–8)
PLATELET # BLD AUTO: 196 K/UL (ref 130–400)
PMV BLD AUTO: 9.8 FL (ref 9.4–12.3)
POTASSIUM SERPL-SCNC: 4.6 MMOL/L (ref 3.5–5)
PROT SERPL-MCNC: 7 G/DL (ref 6.6–8.7)
PROT UR STRIP.AUTO-MCNC: ABNORMAL MG/DL
RBC # BLD AUTO: 4.73 M/UL (ref 4.2–5.4)
RBC #/AREA URNS HPF: ABNORMAL /HPF (ref 0–2)
SODIUM SERPL-SCNC: 138 MMOL/L (ref 136–145)
SP GR UR STRIP.AUTO: 1.03 (ref 1–1.03)
SQUAMOUS #/AREA URNS HPF: ABNORMAL /HPF
TRIGL SERPL-MCNC: 244 MG/DL (ref 0–149)
TSH SERPL DL<=0.005 MIU/L-ACNC: 1.67 UIU/ML (ref 0.27–4.2)
UROBILINOGEN UR STRIP.AUTO-MCNC: 0.2 E.U./DL
WBC # BLD AUTO: 6.8 K/UL (ref 4.8–10.8)
WBC #/AREA URNS HPF: ABNORMAL /HPF (ref 0–5)

## 2023-12-14 PROCEDURE — 99214 OFFICE O/P EST MOD 30 MIN: CPT | Performed by: FAMILY MEDICINE

## 2023-12-14 PROCEDURE — 3046F HEMOGLOBIN A1C LEVEL >9.0%: CPT | Performed by: FAMILY MEDICINE

## 2023-12-14 PROCEDURE — 3075F SYST BP GE 130 - 139MM HG: CPT | Performed by: FAMILY MEDICINE

## 2023-12-14 PROCEDURE — 3078F DIAST BP <80 MM HG: CPT | Performed by: FAMILY MEDICINE

## 2023-12-14 RX ORDER — SEMAGLUTIDE 1.34 MG/ML
INJECTION, SOLUTION SUBCUTANEOUS
Qty: 1 ADJUSTABLE DOSE PRE-FILLED PEN SYRINGE | Refills: 5 | Status: SHIPPED | OUTPATIENT
Start: 2023-12-14
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
## 2023-12-15 ENCOUNTER — TELEPHONE (OUTPATIENT)
Dept: HEMATOLOGY | Age: 58
End: 2023-12-15

## 2023-12-15 DIAGNOSIS — M54.2 ACUTE NECK PAIN: Primary | ICD-10-CM

## 2023-12-15 NOTE — TELEPHONE ENCOUNTER
Spoke with Ms. Dash Torres related to CT scan results due to her increased pain. Severe degenerative disc disease at C6-C7 and C7-T1. She reported that Dr. Berta Bates previously performed her back surgery and he has since retired.   She is in agreement to referral for [facility] Neurosurgery Dr. John Muñoz

## 2023-12-17 LAB
BACTERIA UR CULT: ABNORMAL
BACTERIA UR CULT: ABNORMAL
ORGANISM: ABNORMAL

## 2024-01-02 LAB
25(OH)D3 SERPL-MCNC: 52.1 NG/ML
ANION GAP SERPL CALCULATED.3IONS-SCNC: 11 MMOL/L (ref 7–19)
BUN SERPL-MCNC: 14 MG/DL (ref 6–20)
CALCIUM SERPL-MCNC: 9.4 MG/DL (ref 8.6–10)
CHLORIDE SERPL-SCNC: 98 MMOL/L (ref 98–111)
CO2 SERPL-SCNC: 29 MMOL/L (ref 22–29)
CREAT SERPL-MCNC: 0.7 MG/DL (ref 0.5–0.9)
GLUCOSE SERPL-MCNC: 221 MG/DL (ref 74–109)
POTASSIUM SERPL-SCNC: 4 MMOL/L (ref 3.5–5)
SODIUM SERPL-SCNC: 138 MMOL/L (ref 136–145)

## 2024-01-24 ENCOUNTER — TELEPHONE (OUTPATIENT)
Dept: HEMATOLOGY | Age: 59
End: 2024-01-24

## 2024-01-24 NOTE — TELEPHONE ENCOUNTER
Left voice mail message for patient and rescheduled appointment with Julia Paris from 3/21/24 to 3/28/24 at 11:15 am.  Office phone number given to call with questions or to reschedule.  Reminder letter put in mail.

## 2024-01-29 RX ORDER — BUSPIRONE HYDROCHLORIDE 15 MG/1
TABLET ORAL
Qty: 270 TABLET | Refills: 1 | Status: SHIPPED | OUTPATIENT
Start: 2024-01-29

## 2024-01-29 RX ORDER — OMEPRAZOLE 40 MG/1
40 CAPSULE, DELAYED RELEASE ORAL DAILY
Qty: 90 CAPSULE | Refills: 1 | Status: SHIPPED | OUTPATIENT
Start: 2024-01-29

## 2024-02-12 ENCOUNTER — HOSPITAL ENCOUNTER (OUTPATIENT)
Dept: MRI IMAGING | Age: 59
Discharge: HOME | End: 2024-02-12
Payer: COMMERCIAL

## 2024-02-12 DIAGNOSIS — C90.30 SOLITARY PLASMACYTOMA OF BONE (HCC): ICD-10-CM

## 2024-02-12 PROCEDURE — 6360000004 HC RX CONTRAST MEDICATION: Performed by: NURSE PRACTITIONER

## 2024-02-12 PROCEDURE — 72157 MRI CHEST SPINE W/O & W/DYE: CPT

## 2024-02-12 PROCEDURE — 72158 MRI LUMBAR SPINE W/O & W/DYE: CPT

## 2024-02-12 PROCEDURE — A9577 INJ MULTIHANCE: HCPCS | Performed by: NURSE PRACTITIONER

## 2024-02-12 RX ADMIN — GADOBENATE DIMEGLUMINE 17 ML: 529 INJECTION, SOLUTION INTRAVENOUS at 09:35

## 2024-02-13 ENCOUNTER — OFFICE VISIT (OUTPATIENT)
Dept: NEUROSURGERY | Age: 59
End: 2024-02-13
Payer: COMMERCIAL

## 2024-02-13 VITALS
DIASTOLIC BLOOD PRESSURE: 88 MMHG | SYSTOLIC BLOOD PRESSURE: 150 MMHG | HEIGHT: 60 IN | HEART RATE: 80 BPM | WEIGHT: 188.93 LBS | BODY MASS INDEX: 37.09 KG/M2

## 2024-02-13 DIAGNOSIS — G89.29 CHRONIC MIDLINE THORACIC BACK PAIN: ICD-10-CM

## 2024-02-13 DIAGNOSIS — M54.6 CHRONIC MIDLINE THORACIC BACK PAIN: ICD-10-CM

## 2024-02-13 DIAGNOSIS — S22.000S COMPRESSION FRACTURE OF THORACIC VERTEBRA, UNSPECIFIED THORACIC VERTEBRAL LEVEL, SEQUELA: ICD-10-CM

## 2024-02-13 DIAGNOSIS — G89.29 CHRONIC MIDLINE LOW BACK PAIN WITHOUT SCIATICA: ICD-10-CM

## 2024-02-13 DIAGNOSIS — Z98.890 S/P KYPHOPLASTY: ICD-10-CM

## 2024-02-13 DIAGNOSIS — Z98.1 S/P CERVICAL SPINAL FUSION: ICD-10-CM

## 2024-02-13 DIAGNOSIS — R20.0 NUMBNESS AND TINGLING IN LEFT HAND: ICD-10-CM

## 2024-02-13 DIAGNOSIS — R20.2 NUMBNESS AND TINGLING IN LEFT HAND: ICD-10-CM

## 2024-02-13 DIAGNOSIS — M54.50 CHRONIC MIDLINE LOW BACK PAIN WITHOUT SCIATICA: ICD-10-CM

## 2024-02-13 DIAGNOSIS — S32.000A CLOSED COMPRESSION FRACTURE OF BODY OF LUMBAR VERTEBRA (HCC): ICD-10-CM

## 2024-02-13 DIAGNOSIS — M54.12 CERVICAL RADICULOPATHY: Primary | ICD-10-CM

## 2024-02-13 PROCEDURE — 3077F SYST BP >= 140 MM HG: CPT | Performed by: NEUROLOGICAL SURGERY

## 2024-02-13 PROCEDURE — 99205 OFFICE O/P NEW HI 60 MIN: CPT | Performed by: NEUROLOGICAL SURGERY

## 2024-02-13 PROCEDURE — 3079F DIAST BP 80-89 MM HG: CPT | Performed by: NEUROLOGICAL SURGERY

## 2024-02-13 NOTE — PROGRESS NOTES
Review of Systems   Constitutional: Negative.    HENT: Negative.     Eyes: Negative.    Respiratory: Negative.     Cardiovascular: Negative.    Gastrointestinal: Negative.    Genitourinary: Negative.    Musculoskeletal:  Positive for back pain, falls, joint pain, myalgias and neck pain.   Skin: Negative.    Neurological:  Positive for dizziness, tingling and weakness.   Endo/Heme/Allergies: Negative.    Psychiatric/Behavioral: Negative.        
Rfl: 3    naloxone 4 MG/0.1ML LIQD nasal spray, , Disp: , Rfl:     promethazine (PHENERGAN) 25 MG tablet, , Disp: , Rfl:     buPROPion (WELLBUTRIN XL) 150 MG extended release tablet, Take 1 tablet by mouth every morning, Disp: 30 tablet, Rfl: 4    clotrimazole-betamethasone (LOTRISONE) 1-0.05 % cream, Apply topically 2 times daily. Abdominal rash, Disp: 45 g, Rfl: 5    mirabegron (MYRBETRIQ) 50 MG TB24, Take 50 mg by mouth daily, Disp: 90 tablet, Rfl: 3    glucose monitoring (FREESTYLE FREEDOM) kit, 1 kit by Does not apply route daily, Disp: 1 kit, Rfl: 0    oxyCODONE (OXY-IR) 30 MG immediate release tablet, Take 1 tablet by mouth in the morning, at noon, in the evening, and at bedtime. 4-5 tablets per day, Disp: , Rfl:     Elastic Bandages & Supports (LUMBAR BACK BRACE/SUPPORT PAD) [facility], Please provide patient with an ex- large [place] back brace., Disp: 1 each, Rfl: 1    calcium carbonate 600 MG TABS tablet, Take 1 tablet by mouth daily, Disp: , Rfl:     zoledronic acid (RECLAST) 5 MG/100ML SOLN, Infuse 100 mLs intravenously once, Disp: , Rfl:     vitamin D (ERGOCALCIFEROL) 1.25 MG (02322 UT) CAPS capsule, Take 1 capsule by mouth once a week, Disp: , Rfl:     escitalopram (LEXAPRO) 20 MG tablet, Take 1 tablet by mouth daily, Disp: , Rfl:     gabapentin (NEURONTIN) 600 MG tablet, 1 tablet 3 times daily. 1 tablet 3 times daily, Disp: , Rfl:     lidocaine (LIDODERM) 5 %, , Disp: , Rfl:     senna (SENOKOT) 8.6 MG tablet, Take 1 tablet by mouth as needed, Disp: , Rfl:     Docusate Sodium (DSS) 250 MG CAPS, Take 250 mg by mouth every 12 hours, Disp: , Rfl:     melatonin (RA MELATONIN) 3 MG TABS tablet, Take 1 tablet by mouth daily (Patient taking differently: Take 2 tablets by mouth daily), Disp: 30 tablet, Rfl: 3    Polyethylene Glycol 3350 (MIRALAX PO), Take by mouth daily, Disp: , Rfl:     glucose monitoring kit (FREESTYLE) monitoring kit, 1 kit by Does not apply route daily DX: Diabetes, Disp: 1 kit, Rfl: 0

## 2024-02-23 RX ORDER — ONDANSETRON HYDROCHLORIDE 8 MG/1
TABLET, FILM COATED ORAL
Qty: 30 TABLET | Refills: 0 | Status: SHIPPED | OUTPATIENT
Start: 2024-02-23

## 2024-03-13 SDOH — ECONOMIC STABILITY: FOOD INSECURITY: WITHIN THE PAST 12 MONTHS, THE FOOD YOU BOUGHT JUST DIDN'T LAST AND YOU DIDN'T HAVE MONEY TO GET MORE.: PATIENT DECLINED

## 2024-03-13 SDOH — ECONOMIC STABILITY: INCOME INSECURITY: HOW HARD IS IT FOR YOU TO PAY FOR THE VERY BASICS LIKE FOOD, HOUSING, MEDICAL CARE, AND HEATING?: PATIENT DECLINED

## 2024-03-13 SDOH — HEALTH STABILITY: PHYSICAL HEALTH: ON AVERAGE, HOW MANY MINUTES DO YOU ENGAGE IN EXERCISE AT THIS LEVEL?: 10 MIN

## 2024-03-13 SDOH — ECONOMIC STABILITY: FOOD INSECURITY: WITHIN THE PAST 12 MONTHS, YOU WORRIED THAT YOUR FOOD WOULD RUN OUT BEFORE YOU GOT MONEY TO BUY MORE.: PATIENT DECLINED

## 2024-03-13 SDOH — HEALTH STABILITY: PHYSICAL HEALTH: ON AVERAGE, HOW MANY DAYS PER WEEK DO YOU ENGAGE IN MODERATE TO STRENUOUS EXERCISE (LIKE A BRISK WALK)?: 3 DAYS

## 2024-03-13 ASSESSMENT — PATIENT HEALTH QUESTIONNAIRE - PHQ9
4. FEELING TIRED OR HAVING LITTLE ENERGY: 1
10. IF YOU CHECKED OFF ANY PROBLEMS, HOW DIFFICULT HAVE THESE PROBLEMS MADE IT FOR YOU TO DO YOUR WORK, TAKE CARE OF THINGS AT HOME, OR GET ALONG WITH OTHER PEOPLE: 0
SUM OF ALL RESPONSES TO PHQ QUESTIONS 1-9: 10
7. TROUBLE CONCENTRATING ON THINGS, SUCH AS READING THE NEWSPAPER OR WATCHING TELEVISION: 0
SUM OF ALL RESPONSES TO PHQ9 QUESTIONS 1 & 2: 2
SUM OF ALL RESPONSES TO PHQ QUESTIONS 1-9: 10
2. FEELING DOWN, DEPRESSED OR HOPELESS: 1
SUM OF ALL RESPONSES TO PHQ QUESTIONS 1-9: 10
3. TROUBLE FALLING OR STAYING ASLEEP: 2
1. LITTLE INTEREST OR PLEASURE IN DOING THINGS: 1
8. MOVING OR SPEAKING SO SLOWLY THAT OTHER PEOPLE COULD HAVE NOTICED. OR THE OPPOSITE, BEING SO FIGETY OR RESTLESS THAT YOU HAVE BEEN MOVING AROUND A LOT MORE THAN USUAL: 3
SUM OF ALL RESPONSES TO PHQ QUESTIONS 1-9: 10
9. THOUGHTS THAT YOU WOULD BE BETTER OFF DEAD, OR OF HURTING YOURSELF: 0
5. POOR APPETITE OR OVEREATING: 2
6. FEELING BAD ABOUT YOURSELF - OR THAT YOU ARE A FAILURE OR HAVE LET YOURSELF OR YOUR FAMILY DOWN: 0

## 2024-03-13 ASSESSMENT — COLUMBIA-SUICIDE SEVERITY RATING SCALE - C-SSRS
1. WITHIN THE PAST MONTH, HAVE YOU WISHED YOU WERE DEAD OR WISHED YOU COULD GO TO SLEEP AND NOT WAKE UP?: NO
6. HAVE YOU EVER DONE ANYTHING, STARTED TO DO ANYTHING, OR PREPARED TO DO ANYTHING TO END YOUR LIFE?: NO
2. HAVE YOU ACTUALLY HAD ANY THOUGHTS OF KILLING YOURSELF?: NO

## 2024-03-13 ASSESSMENT — LIFESTYLE VARIABLES
HOW OFTEN DO YOU HAVE A DRINK CONTAINING ALCOHOL: NEVER
HOW MANY STANDARD DRINKS CONTAINING ALCOHOL DO YOU HAVE ON A TYPICAL DAY: PATIENT DOES NOT DRINK

## 2024-03-14 ENCOUNTER — OFFICE VISIT (OUTPATIENT)
Dept: FAMILY MEDICINE CLINIC | Age: 59
End: 2024-03-14
Payer: COMMERCIAL

## 2024-03-14 ENCOUNTER — HOSPITAL ENCOUNTER (OUTPATIENT)
Dept: MRI IMAGING | Age: 59
Discharge: HOME | End: 2024-03-14
Attending: NEUROLOGICAL SURGERY
Payer: MEDICARE

## 2024-03-14 ENCOUNTER — HOSPITAL ENCOUNTER (OUTPATIENT)
Dept: NEUROLOGY | Age: 59
Discharge: HOME | End: 2024-03-14
Payer: MEDICARE

## 2024-03-14 VITALS
DIASTOLIC BLOOD PRESSURE: 74 MMHG | SYSTOLIC BLOOD PRESSURE: 130 MMHG | HEIGHT: 60 IN | BODY MASS INDEX: 37.69 KG/M2 | WEIGHT: 192 LBS | OXYGEN SATURATION: 98 % | HEART RATE: 76 BPM | TEMPERATURE: 96.8 F

## 2024-03-14 DIAGNOSIS — E11.9 TYPE 2 DIABETES MELLITUS WITHOUT COMPLICATION, WITHOUT LONG-TERM CURRENT USE OF INSULIN (HCC): ICD-10-CM

## 2024-03-14 DIAGNOSIS — M54.12 CERVICAL RADICULOPATHY: ICD-10-CM

## 2024-03-14 DIAGNOSIS — I10 PRIMARY HYPERTENSION: ICD-10-CM

## 2024-03-14 DIAGNOSIS — E78.5 HYPERLIPIDEMIA, UNSPECIFIED HYPERLIPIDEMIA TYPE: ICD-10-CM

## 2024-03-14 DIAGNOSIS — K59.00 CONSTIPATION, UNSPECIFIED CONSTIPATION TYPE: ICD-10-CM

## 2024-03-14 DIAGNOSIS — Z00.00 MEDICARE ANNUAL WELLNESS VISIT, SUBSEQUENT: Primary | ICD-10-CM

## 2024-03-14 DIAGNOSIS — Z98.1 S/P CERVICAL SPINAL FUSION: ICD-10-CM

## 2024-03-14 PROBLEM — R20.2 NUMBNESS AND TINGLING IN LEFT HAND: Status: ACTIVE | Noted: 2024-03-14

## 2024-03-14 PROBLEM — R20.0 NUMBNESS AND TINGLING IN LEFT HAND: Status: ACTIVE | Noted: 2024-03-14

## 2024-03-14 LAB
ALBUMIN SERPL-MCNC: 4.4 G/DL (ref 3.5–5.2)
ALP SERPL-CCNC: 74 U/L (ref 35–104)
ALT SERPL-CCNC: 25 U/L (ref 5–33)
ANION GAP SERPL CALCULATED.3IONS-SCNC: 12 MMOL/L (ref 7–19)
AST SERPL-CCNC: 15 U/L (ref 5–32)
BILIRUB SERPL-MCNC: <0.2 MG/DL (ref 0.2–1.2)
BILIRUB UR QL STRIP: NEGATIVE
BUN SERPL-MCNC: 22 MG/DL (ref 6–20)
CALCIUM SERPL-MCNC: 9.2 MG/DL (ref 8.6–10)
CHLORIDE SERPL-SCNC: 106 MMOL/L (ref 98–111)
CHOLEST SERPL-MCNC: 128 MG/DL (ref 160–199)
CLARITY UR: ABNORMAL
CO2 SERPL-SCNC: 23 MMOL/L (ref 22–29)
COLOR UR: YELLOW
CREAT SERPL-MCNC: 0.8 MG/DL (ref 0.5–0.9)
CREAT UR-MCNC: 125.9 MG/DL (ref 28–217)
ERYTHROCYTE [DISTWIDTH] IN BLOOD BY AUTOMATED COUNT: 13.8 % (ref 11.5–14.5)
GLUCOSE SERPL-MCNC: 163 MG/DL (ref 74–109)
GLUCOSE UR STRIP.AUTO-MCNC: NEGATIVE MG/DL
HBA1C MFR BLD: 6.9 % (ref 4–6)
HCT VFR BLD AUTO: 45.2 % (ref 37–47)
HDLC SERPL-MCNC: 40 MG/DL (ref 65–121)
HGB BLD-MCNC: 14 G/DL (ref 12–16)
HGB UR STRIP.AUTO-MCNC: NEGATIVE MG/L
KETONES UR STRIP.AUTO-MCNC: NEGATIVE MG/DL
LDLC SERPL CALC-MCNC: 41 MG/DL
LEUKOCYTE ESTERASE UR QL STRIP.AUTO: NEGATIVE
MCH RBC QN AUTO: 30.4 PG (ref 27–31)
MCHC RBC AUTO-ENTMCNC: 31 G/DL (ref 33–37)
MCV RBC AUTO: 98.3 FL (ref 81–99)
MICROALBUMIN UR-MCNC: <1.2 MG/DL (ref 0–19)
MICROALBUMIN/CREAT UR-RTO: NORMAL MG/G
NITRITE UR QL STRIP.AUTO: NEGATIVE
PH UR STRIP.AUTO: 5.5 [PH] (ref 5–8)
PLATELET # BLD AUTO: 228 K/UL (ref 130–400)
PMV BLD AUTO: 9.4 FL (ref 9.4–12.3)
POTASSIUM SERPL-SCNC: 4.8 MMOL/L (ref 3.5–5)
PROT SERPL-MCNC: 7.5 G/DL (ref 6.6–8.7)
PROT UR STRIP.AUTO-MCNC: NEGATIVE MG/DL
RBC # BLD AUTO: 4.6 M/UL (ref 4.2–5.4)
SODIUM SERPL-SCNC: 141 MMOL/L (ref 136–145)
SP GR UR STRIP.AUTO: 1.03 (ref 1–1.03)
TRIGL SERPL-MCNC: 237 MG/DL (ref 0–149)
UROBILINOGEN UR STRIP.AUTO-MCNC: 1 E.U./DL
WBC # BLD AUTO: 7.3 K/UL (ref 4.8–10.8)

## 2024-03-14 PROCEDURE — 95886 MUSC TEST DONE W/N TEST COMP: CPT

## 2024-03-14 PROCEDURE — 95909 NRV CNDJ TST 5-6 STUDIES: CPT

## 2024-03-14 PROCEDURE — 6360000004 HC RX CONTRAST MEDICATION: Performed by: NEUROLOGICAL SURGERY

## 2024-03-14 PROCEDURE — 3075F SYST BP GE 130 - 139MM HG: CPT | Performed by: FAMILY MEDICINE

## 2024-03-14 PROCEDURE — 72156 MRI NECK SPINE W/O & W/DYE: CPT

## 2024-03-14 PROCEDURE — 3078F DIAST BP <80 MM HG: CPT | Performed by: FAMILY MEDICINE

## 2024-03-14 PROCEDURE — G0439 PPPS, SUBSEQ VISIT: HCPCS | Performed by: FAMILY MEDICINE

## 2024-03-14 PROCEDURE — A9577 INJ MULTIHANCE: HCPCS | Performed by: NEUROLOGICAL SURGERY

## 2024-03-14 RX ORDER — PSEUDOEPHEDRINE HCL 30 MG
250 TABLET ORAL EVERY 12 HOURS
Qty: 30 CAPSULE | Refills: 5 | Status: SHIPPED | OUTPATIENT
Start: 2024-03-14

## 2024-03-14 RX ORDER — OXYCODONE AND ACETAMINOPHEN 10; 325 MG/1; MG/1
1 TABLET ORAL EVERY 4 HOURS PRN
COMMUNITY

## 2024-03-14 RX ORDER — SEMAGLUTIDE 1.34 MG/ML
1 INJECTION, SOLUTION SUBCUTANEOUS WEEKLY
Qty: 3 ML | Refills: 5 | Status: SHIPPED | OUTPATIENT
Start: 2024-03-14

## 2024-03-14 RX ORDER — GLUCOSAMINE HCL/CHONDROITIN SU 500-400 MG
1 CAPSULE ORAL DAILY
Qty: 100 STRIP | Refills: 5 | Status: SHIPPED | OUTPATIENT
Start: 2024-03-14

## 2024-03-14 RX ADMIN — GADOBENATE DIMEGLUMINE 17 ML: 529 INJECTION, SOLUTION INTRAVENOUS at 10:27

## 2024-03-14 ASSESSMENT — LIFESTYLE VARIABLES
HOW OFTEN DO YOU HAVE SIX OR MORE DRINKS ON ONE OCCASION: 1
HOW MANY STANDARD DRINKS CONTAINING ALCOHOL DO YOU HAVE ON A TYPICAL DAY: 0
HOW OFTEN DO YOU HAVE A DRINK CONTAINING ALCOHOL: 1

## 2024-03-14 NOTE — PATIENT INSTRUCTIONS
check for potential hazards in each room. And remember, proper lighting is an essential factor in home safety. If you cannot see clearly, you are more likely to fall.   Important questions to ask yourself include:   Are lamp, electric, extension, and telephone cords placed out of the flow of traffic and maintained in good condition? Have frayed cords been replaced?   Are all small rugs and runners slip resistant? If not, you can secure them to the floor with a special double-sided carpet tape.   Are smoke detectors properly locatedone on every floor of your home and one outside of every sleeping area? Are they in good working order? Are batteries replaced at least once a year?   Do you have a well-maintained carbon monoxide detector outside every sleeping are in your home?   Does your furniture layout leave plenty of space to maneuver between and around chairs, tables, beds, and sofas?   Are hallways, stairs and passages between rooms well lit? Can you reach a lamp without getting out of bed?   Are floor surfaces well maintained? Shag rugs, high-pile carpeting, tile floors, and polished wood floors can be particularly slippery. Stairs should always have handrails and be carpeted or fitted with a non-skid tread.   Is your telephone easily reachable. Is the cord safely tucked away?   Room by Room   According to the Association of Aging, bathrooms and marielle are the two most potentially hazardous rooms in your home.   In the Kitchen    Be sure your stove is in proper working order and always make sure burners and the oven are off before you go out or go to sleep.    Keep pots on the back burners, turn handles away from the front of the stove, and keep stove clean and free of grease build-up.    Kitchen ventilation systems and range exhausts should be working properly.    Keep flammable objects such as towels and pot holders away from the cooking area except when in use. Make sure kitchen curtains are tied back.

## 2024-03-14 NOTE — PROGRESS NOTES
OR 0.5 MG/DOSE,) 2 MG/1.5ML SOPN 0.25 sc weekly x 4 weeks then 0.5 weekly  Richard Mojica MD   promethazine (PHENERGAN) 25 MG tablet   ProviderChayito MD   lidocaine (LIDODERM) 5 %   Chayito Miller MD   Polyethylene Glycol 3350 (MIRALAX PO) Take by mouth daily  ProviderChayito MD       CareTeam (Including outside providers/suppliers regularly involved in providing care):   Patient Care Team:  Richard Mojica MD as PCP - General (Family Medicine)  [facility]  Juan A Villanueva MD (Gastroenterology)     Reviewed and updated this visit:  Tobacco  Allergies  Meds  Med Hx  Surg Hx  Soc Hx  Fam Hx

## 2024-03-15 DIAGNOSIS — E11.9 TYPE 2 DIABETES MELLITUS WITHOUT COMPLICATION, WITHOUT LONG-TERM CURRENT USE OF INSULIN (HCC): ICD-10-CM

## 2024-03-18 ENCOUNTER — OFFICE VISIT (OUTPATIENT)
Dept: NEUROSURGERY | Age: 59
End: 2024-03-18
Payer: COMMERCIAL

## 2024-03-18 VITALS
BODY MASS INDEX: 37.7 KG/M2 | DIASTOLIC BLOOD PRESSURE: 78 MMHG | HEART RATE: 77 BPM | WEIGHT: 192.02 LBS | HEIGHT: 60 IN | SYSTOLIC BLOOD PRESSURE: 142 MMHG

## 2024-03-18 DIAGNOSIS — R20.0 NUMBNESS AND TINGLING IN LEFT HAND: ICD-10-CM

## 2024-03-18 DIAGNOSIS — Z98.1 S/P CERVICAL SPINAL FUSION: ICD-10-CM

## 2024-03-18 DIAGNOSIS — M54.12 CERVICAL RADICULOPATHY: Primary | ICD-10-CM

## 2024-03-18 DIAGNOSIS — R20.2 NUMBNESS AND TINGLING IN LEFT HAND: ICD-10-CM

## 2024-03-18 PROCEDURE — 99214 OFFICE O/P EST MOD 30 MIN: CPT | Performed by: NEUROLOGICAL SURGERY

## 2024-03-18 PROCEDURE — 3077F SYST BP >= 140 MM HG: CPT | Performed by: NEUROLOGICAL SURGERY

## 2024-03-18 PROCEDURE — 3078F DIAST BP <80 MM HG: CPT | Performed by: NEUROLOGICAL SURGERY

## 2024-03-18 ASSESSMENT — ENCOUNTER SYMPTOMS
EYES NEGATIVE: 1
GASTROINTESTINAL NEGATIVE: 1
RESPIRATORY NEGATIVE: 1

## 2024-03-18 NOTE — PROGRESS NOTES
Review of Systems   Constitutional: Negative.    HENT: Negative.     Eyes: Negative.    Respiratory: Negative.     Cardiovascular: Negative.    Gastrointestinal: Negative.    Genitourinary: Negative.    Musculoskeletal:  Positive for joint pain, myalgias and neck pain.   Skin: Negative.    Neurological:  Positive for tingling and weakness.   Endo/Heme/Allergies: Negative.    Psychiatric/Behavioral: Negative.

## 2024-03-18 NOTE — PROGRESS NOTES
NEUROSURGERY FOLLOW UP      Chief Complaint:   Chief Complaint   Patient presents with    Follow-up     Patient is here to follow up after NCS/MRI and states that her symptoms are the same as last visit.     Results     NCS in media, [facility] MRI C         Interval Update:    Patient returns for planned follow up to review her recent cervical spine MRI and EMG/NCS.  Her complains are unchanged.  She continue to endorse chronic neck pain and numbness in her left arm pain hand as well as intermittent radicular pain in her left arm.  Her EMG/NCS was consistent with mild left carpal tunnel syndrome.      HPI:     The patient is a 59 y.o. female  who presents for neurosurgical evaluation of chronic neck and back pain.  She has a fairly extensive history of spinal surgeries with a C4/5 and C5/6 ACDF as well as L2-L5 kyphoplasty in the past with Dr. Reyes.  She also has chronic compression fractures of T12 and L1 that have been longstanding.  She is currently in pain management with Dr. May.  She describes chronic neck pain as well as pain that will radiate into her left arm from her shoulder to her elbow.  He also describes numbness and tingling in the middle, ring and little fingers of her left hand.  She denies any right arm pain or numbness.  She has attempted physical therapy for her neck and back pain and she states these made her symptoms worse.  She is currently wearing a TLSO brace for unclear reasons.     ROS:    Constitutional: Negative.    HENT: Negative.     Eyes: Negative.    Respiratory: Negative.     Cardiovascular: Negative.    Gastrointestinal: Negative.    Genitourinary: Negative.    Musculoskeletal:  Positive for joint pain, myalgias and neck pain.   Skin: Negative.    Neurological:  Positive for tingling and weakness.   Endo/Heme/Allergies: Negative.    Psychiatric/Behavioral: Negative.      Review of systems was obtained by the medical assistant and reviewed by myself.    Objective:    BP (!) 142/78

## 2024-03-25 DIAGNOSIS — C90.30 SOLITARY PLASMACYTOMA OF BONE (HCC): Primary | ICD-10-CM

## 2024-03-26 ENCOUNTER — TELEPHONE (OUTPATIENT)
Dept: HEMATOLOGY | Age: 59
End: 2024-03-26

## 2024-03-26 NOTE — PROGRESS NOTES
and continues to have no known radiographic or serology evidence to suggest recurrent disease.  She has yet to meet CRAB criteria for multiple myeloma.  Myeloma studies were last evaluated on 11/28/2023 were within acceptable limits.    I reviewed the following findings with the completed since her previous follow-up appointment:  10/05/2023 Skeletal survey- reported three distinct sclerotic foci projecting over the right humeral diaphysis. These may be external to the patient. Follow-up two-view right humerus radiograph is recommended for confirmation. Otherwise, no lytic or blastic lesion is identified.   No new compression fracture.     11/28/2023 Serology results ([place])  [place] light chains 2.25  Lambda light chains 1.68  Kappa/Lambda ratio 1.34  IgG 979, IgM 85, IgA 211  No monoclonal protein identified by kappa/lambda immunofixation electrophoresis.  Ca 9.8  Creat 1.11 /GFR 58    02/12/2024 MRI thoracic spine- reported no definite metastasis within the thoracic spine. Chronic compression fracture deformities of T11-L2 superior endplates.  Slight retropulsion of superior T11 fracture fragment, without definite spinal canal stenosis.  Multilevel degenerative changes as detailed above, with mild to moderate right neural foraminal stenosis at T10-T11.    02/12/2024 MRI lumbar spine- reported no definite metastasis within the lumbar spine. Chronic compression fracture deformities of T11-L5, status post vertebroplasty of L2-L5.  Multilevel degenerative changes as detailed above.  No definite spinal canal or neural foraminal stenosis.      -CBC today  -Recommend yearly imaging to include MRI of the thoracic and lumbar spine annually per NCCN guidelines, schedule imaging today  -Myeloma studies and CMP today  -Keep follow up with Dr Parada on 05/29/2024    NCCN guidelines for surveillance of solitary plasmacytoma:  H&P every 3 to 6 months with CBC and CMP  Myeloma studies, LDH and beta-2 microglobulin as

## 2024-03-28 ENCOUNTER — HOSPITAL ENCOUNTER (OUTPATIENT)
Dept: INFUSION THERAPY | Age: 59
Discharge: HOME | End: 2024-03-28
Payer: MEDICARE

## 2024-03-28 ENCOUNTER — OFFICE VISIT (OUTPATIENT)
Dept: HEMATOLOGY | Age: 59
End: 2024-03-28
Payer: MEDICARE

## 2024-03-28 VITALS
HEART RATE: 80 BPM | OXYGEN SATURATION: 95 % | SYSTOLIC BLOOD PRESSURE: 140 MMHG | WEIGHT: 193 LBS | HEIGHT: 60 IN | BODY MASS INDEX: 37.89 KG/M2 | DIASTOLIC BLOOD PRESSURE: 88 MMHG | TEMPERATURE: 97.9 F

## 2024-03-28 DIAGNOSIS — M54.42 CHRONIC BILATERAL LOW BACK PAIN WITH BILATERAL SCIATICA: ICD-10-CM

## 2024-03-28 DIAGNOSIS — C90.30 SOLITARY PLASMACYTOMA OF BONE (HCC): Primary | ICD-10-CM

## 2024-03-28 DIAGNOSIS — M54.41 CHRONIC BILATERAL LOW BACK PAIN WITH BILATERAL SCIATICA: ICD-10-CM

## 2024-03-28 DIAGNOSIS — Z72.0 TOBACCO ABUSE: ICD-10-CM

## 2024-03-28 DIAGNOSIS — G89.29 CHRONIC BILATERAL LOW BACK PAIN WITH BILATERAL SCIATICA: ICD-10-CM

## 2024-03-28 DIAGNOSIS — C90.30 SOLITARY PLASMACYTOMA OF BONE (HCC): ICD-10-CM

## 2024-03-28 LAB
ALBUMIN SERPL-MCNC: 4.8 G/DL (ref 3.5–5.2)
ALP SERPL-CCNC: 74 U/L (ref 35–104)
ALT SERPL-CCNC: 37 U/L (ref 9–52)
ANION GAP SERPL CALCULATED.3IONS-SCNC: 11 MMOL/L (ref 7–19)
AST SERPL-CCNC: 29 U/L (ref 14–36)
BASOPHILS # BLD: 0.03 K/UL (ref 0.01–0.08)
BASOPHILS NFR BLD: 0.5 % (ref 0.1–1.2)
BILIRUB SERPL-MCNC: 0.5 MG/DL (ref 0.2–1.3)
BUN SERPL-MCNC: 23 MG/DL (ref 7–17)
CALCIUM SERPL-MCNC: 9.9 MG/DL (ref 8.4–10.2)
CHLORIDE SERPL-SCNC: 102 MMOL/L (ref 98–111)
CO2 SERPL-SCNC: 26 MMOL/L (ref 22–29)
CREAT SERPL-MCNC: 0.6 MG/DL (ref 0.5–1)
EOSINOPHIL # BLD: 0.46 K/UL (ref 0.04–0.54)
EOSINOPHIL NFR BLD: 7.4 % (ref 0.7–7)
ERYTHROCYTE [DISTWIDTH] IN BLOOD BY AUTOMATED COUNT: 13.2 % (ref 11.7–14.4)
GLOBULIN: 3.2 G/DL
GLUCOSE SERPL-MCNC: 185 MG/DL (ref 74–106)
HCT VFR BLD AUTO: 44.1 % (ref 34.1–44.9)
HGB BLD-MCNC: 14.5 G/DL (ref 11.2–15.7)
LYMPHOCYTES # BLD: 1.59 K/UL (ref 1.18–3.74)
LYMPHOCYTES NFR BLD: 25.4 % (ref 19.3–53.1)
MCH RBC QN AUTO: 30.3 PG (ref 25.6–32.2)
MCHC RBC AUTO-ENTMCNC: 32.9 G/DL (ref 32.3–35.5)
MCV RBC AUTO: 92.3 FL (ref 79.4–94.8)
MONOCYTES # BLD: 0.25 K/UL (ref 0.24–0.82)
MONOCYTES NFR BLD: 4 % (ref 4.7–12.5)
NEUTROPHILS # BLD: 3.89 K/UL (ref 1.56–6.13)
NEUTS SEG NFR BLD: 62.2 % (ref 34–71.1)
PLATELET # BLD AUTO: 193 K/UL (ref 182–369)
PMV BLD AUTO: 8.9 FL (ref 7.4–10.4)
POTASSIUM SERPL-SCNC: 4.3 MMOL/L (ref 3.5–5.1)
PROT SERPL-MCNC: 7.9 G/DL (ref 6.3–8.2)
RBC # BLD AUTO: 4.78 M/UL (ref 3.93–5.22)
SODIUM SERPL-SCNC: 139 MMOL/L (ref 137–145)
WBC # BLD AUTO: 6.25 K/UL (ref 3.98–10.04)

## 2024-03-28 PROCEDURE — 36415 COLL VENOUS BLD VENIPUNCTURE: CPT

## 2024-03-28 PROCEDURE — 85025 COMPLETE CBC W/AUTO DIFF WBC: CPT

## 2024-03-28 PROCEDURE — 80053 COMPREHEN METABOLIC PANEL: CPT

## 2024-03-28 PROCEDURE — 3079F DIAST BP 80-89 MM HG: CPT | Performed by: NURSE PRACTITIONER

## 2024-03-28 PROCEDURE — 99213 OFFICE O/P EST LOW 20 MIN: CPT | Performed by: NURSE PRACTITIONER

## 2024-03-28 PROCEDURE — 3077F SYST BP >= 140 MM HG: CPT | Performed by: NURSE PRACTITIONER

## 2024-03-28 PROCEDURE — 99212 OFFICE O/P EST SF 10 MIN: CPT

## 2024-03-28 ASSESSMENT — ENCOUNTER SYMPTOMS
BACK PAIN: 1
COUGH: 1

## 2024-03-29 ASSESSMENT — ENCOUNTER SYMPTOMS
CONSTIPATION: 0
SHORTNESS OF BREATH: 0
NAUSEA: 0
EYES NEGATIVE: 1
DIARRHEA: 0
GASTROINTESTINAL NEGATIVE: 1
VOMITING: 0
SORE THROAT: 0
WHEEZING: 0
ABDOMINAL PAIN: 0
EYE PAIN: 0
BLOOD IN STOOL: 0
EYE REDNESS: 0
EYE DISCHARGE: 0

## 2024-03-30 LAB — B2 MICROGLOB SERPL-MCNC: 2.5 MG/L (ref 0.8–2.4)

## 2024-03-31 LAB
ALBUMIN SERPL-MCNC: 4.3 G/DL (ref 3.75–5.01)
ALPHA1 GLOB SERPL ELPH-MCNC: 0.3 G/DL (ref 0.19–0.46)
ALPHA2 GLOB SERPL ELPH-MCNC: 0.9 G/DL (ref 0.48–1.05)
B-GLOBULIN SERPL ELPH-MCNC: 0.91 G/DL (ref 0.48–1.1)
DEPRECATED KAPPA LC FREE/LAMBDA SER: 1.32 {RATIO} (ref 0.26–1.65)
EER MONOCLONAL PROTEIN AND FLC, SERUM: ABNORMAL
GAMMA GLOB SERPL ELPH-MCNC: 0.99 G/DL (ref 0.62–1.51)
IGA SERPL-MCNC: 220 MG/DL (ref 68–408)
IGG SERPL-MCNC: 985 MG/DL (ref 768–1632)
IGM SERPL-MCNC: 68 MG/DL (ref 35–263)
INTERPRETATION SERPL IFE-IMP: ABNORMAL
INTERPRETATION SERPL IFE-IMP: ABNORMAL
KAPPA LC FREE SER-MCNC: 26.11 MG/L (ref 3.3–19.4)
LAMBDA LC FREE SERPL-MCNC: 19.76 MG/L (ref 5.71–26.3)
MONOCLONAL PROTEIN, SERUM: ABNORMAL G/DL
PROT SERPL-MCNC: 7.4 G/DL (ref 6.3–8.2)

## 2024-04-01 RX ORDER — ONDANSETRON HYDROCHLORIDE 8 MG/1
TABLET, FILM COATED ORAL
Qty: 30 TABLET | Refills: 0 | Status: SHIPPED | OUTPATIENT
Start: 2024-04-01

## 2024-04-09 DIAGNOSIS — F41.9 ANXIETY AND DEPRESSION: ICD-10-CM

## 2024-04-09 DIAGNOSIS — F32.A ANXIETY AND DEPRESSION: ICD-10-CM

## 2024-04-09 RX ORDER — BUPROPION HYDROCHLORIDE 150 MG/1
150 TABLET ORAL EVERY MORNING
Qty: 30 TABLET | Refills: 5 | Status: SHIPPED | OUTPATIENT
Start: 2024-04-09

## 2024-04-15 RX ORDER — BUSPIRONE HYDROCHLORIDE 15 MG/1
15 TABLET ORAL 3 TIMES DAILY
Qty: 90 TABLET | Refills: 1 | Status: SHIPPED | OUTPATIENT
Start: 2024-04-15

## 2024-05-10 DIAGNOSIS — G89.29 CHRONIC RIGHT-SIDED LOW BACK PAIN WITH RIGHT-SIDED SCIATICA: ICD-10-CM

## 2024-05-10 DIAGNOSIS — M25.551 PAIN OF RIGHT HIP JOINT: ICD-10-CM

## 2024-05-10 DIAGNOSIS — M54.41 CHRONIC RIGHT-SIDED LOW BACK PAIN WITH RIGHT-SIDED SCIATICA: ICD-10-CM

## 2024-05-10 RX ORDER — TIZANIDINE 4 MG/1
TABLET ORAL
Qty: 90 TABLET | Refills: 0 | Status: SHIPPED | OUTPATIENT
Start: 2024-05-10

## 2024-05-28 DIAGNOSIS — E78.5 HYPERLIPIDEMIA, UNSPECIFIED HYPERLIPIDEMIA TYPE: ICD-10-CM

## 2024-05-28 RX ORDER — ONDANSETRON HYDROCHLORIDE 8 MG/1
TABLET, FILM COATED ORAL
Qty: 30 TABLET | Refills: 0 | Status: SHIPPED | OUTPATIENT
Start: 2024-05-28

## 2024-05-28 RX ORDER — SIMVASTATIN 20 MG
20 TABLET ORAL
Qty: 90 TABLET | Refills: 0 | Status: SHIPPED | OUTPATIENT
Start: 2024-05-28

## 2024-06-03 ENCOUNTER — TRANSCRIBE ORDERS (OUTPATIENT)
Dept: ADMINISTRATIVE | Facility: HOSPITAL | Age: 59
End: 2024-06-03
Payer: MEDICARE

## 2024-06-03 ENCOUNTER — HOSPITAL ENCOUNTER (OUTPATIENT)
Dept: GENERAL RADIOLOGY | Facility: HOSPITAL | Age: 59
Discharge: HOME | End: 2024-06-03
Admitting: PAIN MEDICINE
Payer: MEDICARE

## 2024-06-03 DIAGNOSIS — M79.10 MYALGIA: ICD-10-CM

## 2024-06-03 DIAGNOSIS — M54.16 LUMBAR RADICULOPATHY: ICD-10-CM

## 2024-06-03 DIAGNOSIS — G89.29 OTHER CHRONIC PAIN: ICD-10-CM

## 2024-06-03 DIAGNOSIS — M54.16 LUMBAR RADICULOPATHY: Primary | ICD-10-CM

## 2024-06-03 DIAGNOSIS — Z79.891 ENCOUNTER FOR LONG-TERM METHADONE USE: ICD-10-CM

## 2024-06-03 PROCEDURE — 72050 X-RAY EXAM NECK SPINE 4/5VWS: CPT

## 2024-06-07 DIAGNOSIS — M54.41 CHRONIC RIGHT-SIDED LOW BACK PAIN WITH RIGHT-SIDED SCIATICA: ICD-10-CM

## 2024-06-07 DIAGNOSIS — I10 PRIMARY HYPERTENSION: ICD-10-CM

## 2024-06-07 DIAGNOSIS — M25.551 PAIN OF RIGHT HIP JOINT: ICD-10-CM

## 2024-06-07 DIAGNOSIS — G89.29 CHRONIC RIGHT-SIDED LOW BACK PAIN WITH RIGHT-SIDED SCIATICA: ICD-10-CM

## 2024-06-07 RX ORDER — LOSARTAN POTASSIUM 50 MG/1
50 TABLET ORAL DAILY
Qty: 90 TABLET | Refills: 0 | Status: SHIPPED | OUTPATIENT
Start: 2024-06-07

## 2024-06-07 RX ORDER — TIZANIDINE 4 MG/1
TABLET ORAL
Qty: 90 TABLET | Refills: 0 | Status: SHIPPED | OUTPATIENT
Start: 2024-06-07

## 2024-06-07 RX ORDER — MEMANTINE HYDROCHLORIDE 5 MG/1
5 TABLET ORAL 2 TIMES DAILY
Qty: 180 TABLET | Refills: 0 | Status: SHIPPED | OUTPATIENT
Start: 2024-06-07

## 2024-07-01 ENCOUNTER — PATIENT MESSAGE (OUTPATIENT)
Dept: FAMILY MEDICINE CLINIC | Age: 59
End: 2024-07-01

## 2024-07-01 RX ORDER — ONDANSETRON HYDROCHLORIDE 8 MG/1
TABLET, FILM COATED ORAL
Qty: 30 TABLET | Refills: 0 | Status: SHIPPED | OUTPATIENT
Start: 2024-07-01

## 2024-07-01 RX ORDER — ESCITALOPRAM OXALATE 20 MG/1
20 TABLET ORAL DAILY
Qty: 30 TABLET | Refills: 5 | Status: SHIPPED | OUTPATIENT
Start: 2024-07-01

## 2024-07-01 NOTE — TELEPHONE ENCOUNTER
From: Lynn Mendoza  To: Dr. Richard Mojica  Sent: 7/1/2024 9:11 AM CDT  Subject: Lexapro     For some reason when trying to refill the escalipram (Lexapro), it is trying to refill in [place]. All my medication should be up here except for Reclast and vitamin d. Could you please send my prescription for Lexapro to [facility] pharmacy to be refilled? I am out of them.

## 2024-07-24 DIAGNOSIS — G89.29 CHRONIC RIGHT-SIDED LOW BACK PAIN WITH RIGHT-SIDED SCIATICA: ICD-10-CM

## 2024-07-24 DIAGNOSIS — M25.551 PAIN OF RIGHT HIP JOINT: ICD-10-CM

## 2024-07-24 DIAGNOSIS — M54.41 CHRONIC RIGHT-SIDED LOW BACK PAIN WITH RIGHT-SIDED SCIATICA: ICD-10-CM

## 2024-07-24 RX ORDER — TIZANIDINE 4 MG/1
TABLET ORAL
Qty: 90 TABLET | Refills: 2 | Status: SHIPPED | OUTPATIENT
Start: 2024-07-24

## 2024-08-01 RX ORDER — ONDANSETRON HYDROCHLORIDE 8 MG/1
TABLET, FILM COATED ORAL
Qty: 30 TABLET | Refills: 0 | Status: SHIPPED | OUTPATIENT
Start: 2024-08-01

## 2024-08-08 DIAGNOSIS — E11.9 TYPE 2 DIABETES MELLITUS WITHOUT COMPLICATION, WITHOUT LONG-TERM CURRENT USE OF INSULIN (HCC): ICD-10-CM

## 2024-08-08 RX ORDER — SEMAGLUTIDE 1.34 MG/ML
INJECTION, SOLUTION SUBCUTANEOUS
Qty: 3 ML | Refills: 0 | Status: SHIPPED | OUTPATIENT
Start: 2024-08-08

## 2024-08-29 NOTE — TELEPHONE ENCOUNTER
Received fax from pharmacy requesting refill on pts medication(s). Pt was last seen in office on 3/14/2024  and has a follow up scheduled for Visit date not found. Will send request to   Dr Mojica   for patient.     Requested Prescriptions     Pending Prescriptions Disp Refills    omeprazole (PRILOSEC) 40 MG delayed release capsule [Pharmacy Med Name: Omeprazole 40 MG Oral Capsule Delayed Release] 90 capsule 0     Sig: Take 1 capsule by mouth once daily

## 2024-09-02 DIAGNOSIS — F32.A ANXIETY AND DEPRESSION: ICD-10-CM

## 2024-09-02 DIAGNOSIS — F41.9 ANXIETY AND DEPRESSION: ICD-10-CM

## 2024-09-02 DIAGNOSIS — I10 PRIMARY HYPERTENSION: Primary | ICD-10-CM

## 2024-09-02 DIAGNOSIS — E78.5 HYPERLIPIDEMIA, UNSPECIFIED HYPERLIPIDEMIA TYPE: ICD-10-CM

## 2024-09-02 DIAGNOSIS — E11.9 TYPE 2 DIABETES MELLITUS WITHOUT COMPLICATION, WITHOUT LONG-TERM CURRENT USE OF INSULIN (HCC): ICD-10-CM

## 2024-09-02 RX ORDER — OMEPRAZOLE 40 MG/1
40 CAPSULE, DELAYED RELEASE ORAL DAILY
Qty: 90 CAPSULE | Refills: 0 | Status: SHIPPED | OUTPATIENT
Start: 2024-09-02

## 2024-09-04 DIAGNOSIS — E11.9 TYPE 2 DIABETES MELLITUS WITHOUT COMPLICATION, WITHOUT LONG-TERM CURRENT USE OF INSULIN (HCC): ICD-10-CM

## 2024-09-04 RX ORDER — SEMAGLUTIDE 1.34 MG/ML
INJECTION, SOLUTION SUBCUTANEOUS
Qty: 3 ML | Refills: 0 | Status: SHIPPED | OUTPATIENT
Start: 2024-09-04

## 2024-09-10 DIAGNOSIS — I10 PRIMARY HYPERTENSION: ICD-10-CM

## 2024-09-11 RX ORDER — LOSARTAN POTASSIUM 50 MG/1
50 TABLET ORAL DAILY
Qty: 30 TABLET | Refills: 0 | Status: SHIPPED | OUTPATIENT
Start: 2024-09-11

## 2024-09-11 RX ORDER — MEMANTINE HYDROCHLORIDE 5 MG/1
5 TABLET ORAL 2 TIMES DAILY
Qty: 60 TABLET | Refills: 0 | Status: SHIPPED | OUTPATIENT
Start: 2024-09-11

## 2024-09-11 RX ORDER — ONDANSETRON 8 MG/1
TABLET, FILM COATED ORAL
Qty: 30 TABLET | Refills: 0 | Status: SHIPPED | OUTPATIENT
Start: 2024-09-11

## 2024-09-23 ENCOUNTER — TELEPHONE (OUTPATIENT)
Dept: HEMATOLOGY | Age: 59
End: 2024-09-23

## 2024-09-25 DIAGNOSIS — C90.30 SOLITARY PLASMACYTOMA OF BONE (HCC): Primary | ICD-10-CM

## 2024-09-25 NOTE — PROGRESS NOTES
Progress Note      Pt Name: Lynn Mendoza  YOB: 1965  MRN: [id number]    Date of evaluation: 09/26/2024  History Obtained From:  patient, electronic medical record    CHIEF COMPLAINT:    No chief complaint on file.    HISTORY OF PRESENT ILLNESS:    Lynn Mendoza is a 59 y.o.  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  She received adjuvant radiation therapy and has yet to require further treatment.  She has continued to have no known radiographic imaging or serology studies to suggest progression of disease.  Lynn returns today in scheduled follow-up for evaluation, lab monitoring and further treatment recommendations.      Today's clinic visit to include physical assessment, review of systems, any lab or radiographic findings that were available and plan of care are documented below.    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 Parada at [place] for second opinion   8/5/2019 -9/19/2019 adjuvant radiation therapy for a total of 5040 cGy.  Routine monitoring with serology studies    Oncology history:  Lynn was seen in initial oncology consultation on 7/1/2019 for a new finding of plasma cell myeloma after having kyphoplasty to the fifth lumbar vertebrae on 5/8/2019 by . Lynn was referred from Dr. Mojica for further evaluation and treatment recommendation. She presented with no specific complaints other than chronic back pain. She has a significant medical history to include arthritis, hypertension, neuropathy, osteoarthritis, type 2 diabetes, mitral valve prolapse and short-term memory issues. She denied a known personal or family history of malignancy. She reported a 34 year pack per day history of

## 2024-09-26 ENCOUNTER — HOSPITAL ENCOUNTER (OUTPATIENT)
Dept: INFUSION THERAPY | Age: 59
Discharge: HOME | End: 2024-09-26
Payer: MEDICARE

## 2024-09-26 ENCOUNTER — OFFICE VISIT (OUTPATIENT)
Dept: HEMATOLOGY | Age: 59
End: 2024-09-26
Payer: MEDICARE

## 2024-09-26 VITALS
HEART RATE: 84 BPM | WEIGHT: 195.2 LBS | DIASTOLIC BLOOD PRESSURE: 84 MMHG | HEIGHT: 60 IN | BODY MASS INDEX: 38.32 KG/M2 | SYSTOLIC BLOOD PRESSURE: 132 MMHG | TEMPERATURE: 97.4 F | OXYGEN SATURATION: 97 %

## 2024-09-26 DIAGNOSIS — M54.42 CHRONIC BILATERAL LOW BACK PAIN WITH BILATERAL SCIATICA: Primary | ICD-10-CM

## 2024-09-26 DIAGNOSIS — C90.30 SOLITARY PLASMACYTOMA OF BONE (HCC): ICD-10-CM

## 2024-09-26 DIAGNOSIS — M54.41 CHRONIC BILATERAL LOW BACK PAIN WITH BILATERAL SCIATICA: Primary | ICD-10-CM

## 2024-09-26 DIAGNOSIS — G89.29 CHRONIC BILATERAL LOW BACK PAIN WITH BILATERAL SCIATICA: Primary | ICD-10-CM

## 2024-09-26 DIAGNOSIS — Z72.0 TOBACCO ABUSE: ICD-10-CM

## 2024-09-26 LAB
ALBUMIN SERPL-MCNC: 4.7 G/DL (ref 3.5–5.2)
ALP SERPL-CCNC: 75 U/L (ref 35–104)
ALT SERPL-CCNC: 24 U/L (ref 5–33)
ANION GAP SERPL CALCULATED.3IONS-SCNC: 10 MMOL/L (ref 7–19)
AST SERPL-CCNC: 18 U/L (ref 5–32)
BASOPHILS # BLD: 0.07 K/UL (ref 0.01–0.08)
BASOPHILS NFR BLD: 0.8 % (ref 0.1–1.2)
BILIRUB SERPL-MCNC: 0.3 MG/DL (ref 0–1.2)
BUN SERPL-MCNC: 22 MG/DL (ref 6–20)
CALCIUM SERPL-MCNC: 9.7 MG/DL (ref 8.6–10)
CHLORIDE SERPL-SCNC: 101 MMOL/L (ref 98–107)
CO2 SERPL-SCNC: 28 MMOL/L (ref 22–29)
CREAT SERPL-MCNC: 0.9 MG/DL (ref 0.5–0.9)
EOSINOPHIL # BLD: 0.46 K/UL (ref 0.04–0.54)
EOSINOPHIL NFR BLD: 5.5 % (ref 0.7–7)
ERYTHROCYTE [DISTWIDTH] IN BLOOD BY AUTOMATED COUNT: 12.9 % (ref 11.7–14.4)
GLUCOSE SERPL-MCNC: 146 MG/DL (ref 70–99)
HCT VFR BLD AUTO: 46.6 % (ref 34.1–44.9)
HGB BLD-MCNC: 15.6 G/DL (ref 11.2–15.7)
LYMPHOCYTES # BLD: 2.1 K/UL (ref 1.18–3.74)
LYMPHOCYTES NFR BLD: 25.1 % (ref 19.3–53.1)
MCH RBC QN AUTO: 31 PG (ref 25.6–32.2)
MCHC RBC AUTO-ENTMCNC: 33.5 G/DL (ref 32.3–35.5)
MCV RBC AUTO: 92.5 FL (ref 79.4–94.8)
MONOCYTES # BLD: 0.43 K/UL (ref 0.24–0.82)
MONOCYTES NFR BLD: 5.1 % (ref 4.7–12.5)
NEUTROPHILS # BLD: 5.25 K/UL (ref 1.56–6.13)
NEUTS SEG NFR BLD: 63 % (ref 34–71.1)
PLATELET # BLD AUTO: 263 K/UL (ref 182–369)
PMV BLD AUTO: 9.2 FL (ref 7.4–10.4)
POTASSIUM SERPL-SCNC: 4.5 MMOL/L (ref 3.5–5.1)
PROT SERPL-MCNC: 8 G/DL (ref 6.4–8.3)
RBC # BLD AUTO: 5.04 M/UL (ref 3.93–5.22)
SODIUM SERPL-SCNC: 139 MMOL/L (ref 136–145)
WBC # BLD AUTO: 8.35 K/UL (ref 3.98–10.04)

## 2024-09-26 PROCEDURE — 3079F DIAST BP 80-89 MM HG: CPT | Performed by: NURSE PRACTITIONER

## 2024-09-26 PROCEDURE — 80053 COMPREHEN METABOLIC PANEL: CPT

## 2024-09-26 PROCEDURE — 99212 OFFICE O/P EST SF 10 MIN: CPT

## 2024-09-26 PROCEDURE — 99213 OFFICE O/P EST LOW 20 MIN: CPT | Performed by: NURSE PRACTITIONER

## 2024-09-26 PROCEDURE — 36415 COLL VENOUS BLD VENIPUNCTURE: CPT

## 2024-09-26 PROCEDURE — 85025 COMPLETE CBC W/AUTO DIFF WBC: CPT

## 2024-09-26 PROCEDURE — 3075F SYST BP GE 130 - 139MM HG: CPT | Performed by: NURSE PRACTITIONER

## 2024-09-26 RX ORDER — CITALOPRAM HYDROBROMIDE 10 MG/1
1 TABLET ORAL
COMMUNITY

## 2024-09-26 ASSESSMENT — ENCOUNTER SYMPTOMS: BACK PAIN: 1

## 2024-09-28 LAB — B2 MICROGLOB SERPL-MCNC: 1.8 MG/L (ref 0.8–2.4)

## 2024-09-29 LAB
ALBUMIN SERPL-MCNC: 4.56 G/DL (ref 3.75–5.01)
ALPHA1 GLOB SERPL ELPH-MCNC: 0.28 G/DL (ref 0.19–0.46)
ALPHA2 GLOB SERPL ELPH-MCNC: 0.9 G/DL (ref 0.48–1.05)
B-GLOBULIN SERPL ELPH-MCNC: 0.9 G/DL (ref 0.48–1.1)
DEPRECATED KAPPA LC FREE/LAMBDA SER: 1.12 {RATIO} (ref 0.26–1.65)
EER MONOCLONAL PROTEIN AND FLC, SERUM: NORMAL
GAMMA GLOB SERPL ELPH-MCNC: 0.96 G/DL (ref 0.62–1.51)
IGA SERPL-MCNC: 194 MG/DL (ref 68–408)
IGG SERPL-MCNC: 950 MG/DL (ref 768–1632)
IGM SERPL-MCNC: 74 MG/DL (ref 35–263)
INTERPRETATION SERPL IFE-IMP: NORMAL
INTERPRETATION SERPL IFE-IMP: NORMAL
KAPPA LC FREE SER-MCNC: 17.19 MG/L (ref 3.3–19.4)
LAMBDA LC FREE SERPL-MCNC: 15.4 MG/L (ref 5.71–26.3)
MONOCLONAL PROTEIN, SERUM: NORMAL G/DL
PROT SERPL-MCNC: 7.6 G/DL (ref 6.3–8.2)

## 2024-09-30 RX ORDER — ONDANSETRON 8 MG/1
TABLET, FILM COATED ORAL
Qty: 30 TABLET | Refills: 0 | Status: SHIPPED | OUTPATIENT
Start: 2024-09-30

## 2024-10-03 ASSESSMENT — ENCOUNTER SYMPTOMS
VOMITING: 0
WHEEZING: 0
EYES NEGATIVE: 1
EYE DISCHARGE: 0
EYE PAIN: 0
GASTROINTESTINAL NEGATIVE: 1
RESPIRATORY NEGATIVE: 1
SHORTNESS OF BREATH: 0
COUGH: 0
ABDOMINAL PAIN: 0
DIARRHEA: 0
EYE REDNESS: 0
BLOOD IN STOOL: 0
NAUSEA: 0
CONSTIPATION: 0
SORE THROAT: 0

## 2024-10-05 DIAGNOSIS — E11.9 TYPE 2 DIABETES MELLITUS WITHOUT COMPLICATION, WITHOUT LONG-TERM CURRENT USE OF INSULIN (HCC): ICD-10-CM

## 2024-10-07 DIAGNOSIS — E11.9 TYPE 2 DIABETES MELLITUS WITHOUT COMPLICATION, WITHOUT LONG-TERM CURRENT USE OF INSULIN (HCC): ICD-10-CM

## 2024-10-07 RX ORDER — SEMAGLUTIDE 1.34 MG/ML
INJECTION, SOLUTION SUBCUTANEOUS
Qty: 3 ML | Refills: 5 | Status: SHIPPED | OUTPATIENT
Start: 2024-10-07

## 2024-10-07 NOTE — TELEPHONE ENCOUNTER
One month only. Patient needs to schedule an appointment for an office visit and get fasting labs a few days prior to their appointment.

## 2024-10-18 DIAGNOSIS — G89.29 CHRONIC RIGHT-SIDED LOW BACK PAIN WITH RIGHT-SIDED SCIATICA: ICD-10-CM

## 2024-10-18 DIAGNOSIS — M25.551 PAIN OF RIGHT HIP JOINT: ICD-10-CM

## 2024-10-18 DIAGNOSIS — M54.41 CHRONIC RIGHT-SIDED LOW BACK PAIN WITH RIGHT-SIDED SCIATICA: ICD-10-CM

## 2024-10-18 RX ORDER — ESCITALOPRAM OXALATE 20 MG/1
20 TABLET ORAL DAILY
Qty: 90 TABLET | Refills: 0 | Status: SHIPPED | OUTPATIENT
Start: 2024-10-18

## 2024-10-28 RX ORDER — BUSPIRONE HYDROCHLORIDE 15 MG/1
15 TABLET ORAL 3 TIMES DAILY
Qty: 180 TABLET | Refills: 1 | Status: SHIPPED | OUTPATIENT
Start: 2024-10-28

## 2024-11-04 DIAGNOSIS — E11.9 TYPE 2 DIABETES MELLITUS WITHOUT COMPLICATION, WITHOUT LONG-TERM CURRENT USE OF INSULIN (HCC): ICD-10-CM

## 2024-11-14 DIAGNOSIS — M25.551 PAIN OF RIGHT HIP JOINT: ICD-10-CM

## 2024-11-14 DIAGNOSIS — M54.41 CHRONIC RIGHT-SIDED LOW BACK PAIN WITH RIGHT-SIDED SCIATICA: ICD-10-CM

## 2024-11-14 DIAGNOSIS — G89.29 CHRONIC RIGHT-SIDED LOW BACK PAIN WITH RIGHT-SIDED SCIATICA: ICD-10-CM

## 2024-11-15 DIAGNOSIS — E11.9 TYPE 2 DIABETES MELLITUS WITHOUT COMPLICATION, WITHOUT LONG-TERM CURRENT USE OF INSULIN (HCC): ICD-10-CM

## 2024-11-15 DIAGNOSIS — F41.9 ANXIETY AND DEPRESSION: ICD-10-CM

## 2024-11-15 DIAGNOSIS — F32.A ANXIETY AND DEPRESSION: ICD-10-CM

## 2024-11-15 DIAGNOSIS — I10 PRIMARY HYPERTENSION: ICD-10-CM

## 2024-11-15 DIAGNOSIS — E78.5 HYPERLIPIDEMIA, UNSPECIFIED HYPERLIPIDEMIA TYPE: ICD-10-CM

## 2024-11-15 LAB
ALBUMIN SERPL-MCNC: 4.4 G/DL (ref 3.5–5.2)
ALP SERPL-CCNC: 69 U/L (ref 35–104)
ALT SERPL-CCNC: 21 U/L (ref 5–33)
ANION GAP SERPL CALCULATED.3IONS-SCNC: 14 MMOL/L (ref 7–19)
AST SERPL-CCNC: 16 U/L (ref 5–32)
BILIRUB SERPL-MCNC: 0.4 MG/DL (ref 0.2–1.2)
BILIRUB UR QL STRIP: NEGATIVE
BUN SERPL-MCNC: 14 MG/DL (ref 6–20)
CALCIUM SERPL-MCNC: 10.1 MG/DL (ref 8.6–10)
CHLORIDE SERPL-SCNC: 100 MMOL/L (ref 98–111)
CHOLEST SERPL-MCNC: 138 MG/DL (ref 0–199)
CLARITY UR: CLEAR
CO2 SERPL-SCNC: 25 MMOL/L (ref 22–29)
COLOR UR: YELLOW
CREAT SERPL-MCNC: 0.7 MG/DL (ref 0.5–0.9)
CREAT UR-MCNC: 205.1 MG/DL (ref 28–217)
ERYTHROCYTE [DISTWIDTH] IN BLOOD BY AUTOMATED COUNT: 12.8 % (ref 11.5–14.5)
GLUCOSE SERPL-MCNC: 126 MG/DL (ref 70–99)
GLUCOSE UR STRIP.AUTO-MCNC: NEGATIVE MG/DL
HBA1C MFR BLD: 6.3 % (ref 4–5.6)
HCT VFR BLD AUTO: 47.1 % (ref 37–47)
HDLC SERPL-MCNC: 43 MG/DL (ref 40–60)
HGB BLD-MCNC: 15.3 G/DL (ref 12–16)
HGB UR STRIP.AUTO-MCNC: NEGATIVE MG/L
KETONES UR STRIP.AUTO-MCNC: ABNORMAL MG/DL
LDLC SERPL CALC-MCNC: 40 MG/DL
LEUKOCYTE ESTERASE UR QL STRIP.AUTO: NEGATIVE
MCH RBC QN AUTO: 31.4 PG (ref 27–31)
MCHC RBC AUTO-ENTMCNC: 32.5 G/DL (ref 33–37)
MCV RBC AUTO: 96.7 FL (ref 81–99)
MICROALBUMIN UR-MCNC: 4 MG/DL (ref 0–1.99)
MICROALBUMIN/CREAT UR-RTO: 19.5 MG/G
NITRITE UR QL STRIP.AUTO: NEGATIVE
PH UR STRIP.AUTO: 5.5 [PH] (ref 5–8)
PLATELET # BLD AUTO: 237 K/UL (ref 130–400)
PMV BLD AUTO: 10 FL (ref 9.4–12.3)
POTASSIUM SERPL-SCNC: 4.2 MMOL/L (ref 3.5–5)
PROT SERPL-MCNC: 7.4 G/DL (ref 6.4–8.3)
PROT UR STRIP.AUTO-MCNC: ABNORMAL MG/DL
RBC # BLD AUTO: 4.87 M/UL (ref 4.2–5.4)
SODIUM SERPL-SCNC: 139 MMOL/L (ref 136–145)
SP GR UR STRIP.AUTO: 1.02 (ref 1–1.03)
TRIGL SERPL-MCNC: 276 MG/DL (ref 0–149)
TSH SERPL DL<=0.005 MIU/L-ACNC: 2.26 UIU/ML (ref 0.27–4.2)
UROBILINOGEN UR STRIP.AUTO-MCNC: 0.2 E.U./DL
WBC # BLD AUTO: 7.7 K/UL (ref 4.8–10.8)

## 2024-11-16 DIAGNOSIS — G89.29 CHRONIC RIGHT-SIDED LOW BACK PAIN WITH RIGHT-SIDED SCIATICA: ICD-10-CM

## 2024-11-16 DIAGNOSIS — M54.41 CHRONIC RIGHT-SIDED LOW BACK PAIN WITH RIGHT-SIDED SCIATICA: ICD-10-CM

## 2024-11-16 DIAGNOSIS — M25.551 PAIN OF RIGHT HIP JOINT: ICD-10-CM

## 2024-11-20 ENCOUNTER — TELEPHONE (OUTPATIENT)
Dept: FAMILY MEDICINE CLINIC | Age: 59
End: 2024-11-20

## 2024-11-20 DIAGNOSIS — M54.41 CHRONIC RIGHT-SIDED LOW BACK PAIN WITH RIGHT-SIDED SCIATICA: ICD-10-CM

## 2024-11-20 DIAGNOSIS — M25.551 PAIN OF RIGHT HIP JOINT: ICD-10-CM

## 2024-11-20 DIAGNOSIS — G89.29 CHRONIC RIGHT-SIDED LOW BACK PAIN WITH RIGHT-SIDED SCIATICA: ICD-10-CM

## 2024-11-20 RX ORDER — TIZANIDINE 2 MG/1
4 TABLET ORAL EVERY 8 HOURS PRN
Qty: 180 TABLET | Refills: 2 | Status: SHIPPED | OUTPATIENT
Start: 2024-11-20 | End: 2025-02-18

## 2024-11-20 NOTE — TELEPHONE ENCOUNTER
[facility] sent a fax stating the 4 mg tizanidine are on backorder and the capsules aren't covered. Please send a Rx for the 2 mg and the insurance will require a PA.

## 2024-11-27 RX ORDER — OMEPRAZOLE 40 MG/1
40 CAPSULE, DELAYED RELEASE ORAL DAILY
Qty: 90 CAPSULE | Refills: 1 | Status: SHIPPED | OUTPATIENT
Start: 2024-11-27

## 2024-11-29 DIAGNOSIS — I10 PRIMARY HYPERTENSION: ICD-10-CM

## 2024-12-02 RX ORDER — MEMANTINE HYDROCHLORIDE 5 MG/1
5 TABLET ORAL 2 TIMES DAILY
Qty: 60 TABLET | Refills: 2 | Status: SHIPPED | OUTPATIENT
Start: 2024-12-02

## 2024-12-02 RX ORDER — ONDANSETRON 8 MG/1
TABLET, FILM COATED ORAL
Qty: 30 TABLET | Refills: 2 | Status: SHIPPED | OUTPATIENT
Start: 2024-12-02

## 2024-12-02 RX ORDER — LOSARTAN POTASSIUM 50 MG/1
50 TABLET ORAL DAILY
Qty: 30 TABLET | Refills: 3 | Status: SHIPPED | OUTPATIENT
Start: 2024-12-02

## 2024-12-05 DIAGNOSIS — E11.9 TYPE 2 DIABETES MELLITUS WITHOUT COMPLICATION, WITHOUT LONG-TERM CURRENT USE OF INSULIN (HCC): ICD-10-CM

## 2025-01-02 LAB
25(OH)D3 SERPL-MCNC: 66.3 NG/ML
ALBUMIN SERPL-MCNC: 4.4 G/DL (ref 3.5–5.2)
ALP SERPL-CCNC: 78 U/L (ref 35–104)
ALT SERPL-CCNC: 22 U/L (ref 5–33)
ANION GAP SERPL CALCULATED.3IONS-SCNC: 12 MMOL/L (ref 7–19)
AST SERPL-CCNC: 17 U/L (ref 5–32)
BILIRUB SERPL-MCNC: 0.4 MG/DL (ref 0.2–1.2)
BUN SERPL-MCNC: 18 MG/DL (ref 6–20)
CALCIUM SERPL-MCNC: 9.3 MG/DL (ref 8.6–10)
CHLORIDE SERPL-SCNC: 102 MMOL/L (ref 98–111)
CO2 SERPL-SCNC: 24 MMOL/L (ref 22–29)
CREAT SERPL-MCNC: 0.7 MG/DL (ref 0.5–0.9)
GLUCOSE SERPL-MCNC: 114 MG/DL (ref 70–99)
POTASSIUM SERPL-SCNC: 4.1 MMOL/L (ref 3.5–5)
PROT SERPL-MCNC: 7.5 G/DL (ref 6.4–8.3)
SODIUM SERPL-SCNC: 138 MMOL/L (ref 136–145)

## 2025-01-07 DIAGNOSIS — E11.9 TYPE 2 DIABETES MELLITUS WITHOUT COMPLICATION, WITHOUT LONG-TERM CURRENT USE OF INSULIN (HCC): ICD-10-CM

## 2025-01-11 DIAGNOSIS — E78.5 HYPERLIPIDEMIA, UNSPECIFIED HYPERLIPIDEMIA TYPE: ICD-10-CM

## 2025-01-13 RX ORDER — SIMVASTATIN 20 MG
20 TABLET ORAL
Qty: 90 TABLET | Refills: 0 | Status: SHIPPED | OUTPATIENT
Start: 2025-01-13

## 2025-01-13 RX ORDER — MEMANTINE HYDROCHLORIDE 5 MG/1
5 TABLET ORAL 2 TIMES DAILY
Qty: 180 TABLET | Refills: 0 | Status: SHIPPED | OUTPATIENT
Start: 2025-01-13

## 2025-01-24 DIAGNOSIS — E11.9 TYPE 2 DIABETES MELLITUS WITHOUT COMPLICATION, WITHOUT LONG-TERM CURRENT USE OF INSULIN (HCC): ICD-10-CM

## 2025-01-24 DIAGNOSIS — E78.5 HYPERLIPIDEMIA, UNSPECIFIED HYPERLIPIDEMIA TYPE: ICD-10-CM

## 2025-01-24 DIAGNOSIS — E11.9 TYPE 2 DIABETES MELLITUS WITHOUT COMPLICATION, WITHOUT LONG-TERM CURRENT USE OF INSULIN: ICD-10-CM

## 2025-01-24 RX ORDER — SIMVASTATIN 20 MG
TABLET ORAL
Qty: 30 TABLET | Refills: 0 | OUTPATIENT
Start: 2025-01-24

## 2025-01-24 NOTE — TELEPHONE ENCOUNTER
Lynn J Nelson called to request a refill on her medication.      Last office visit : 3/14/2024   Next office visit : 1/24/2025     Requested Prescriptions     Pending Prescriptions Disp Refills    OZEMPIC, 1 MG/DOSE, 4 MG/3ML SOPN sc injection [Pharmacy Med Name: Ozempic (1 MG/DOSE) 4 MG/3ML Subcutaneous Solution Pen-injector] 3 mL 0     Sig: INJECT 1 MG SUB Q ONCE WEEKLY.            Teresa Pacheco MA

## 2025-01-27 RX ORDER — SEMAGLUTIDE 1.34 MG/ML
INJECTION, SOLUTION SUBCUTANEOUS
Qty: 3 ML | Refills: 0 | Status: SHIPPED | OUTPATIENT
Start: 2025-01-27

## 2025-02-11 DIAGNOSIS — E78.5 HYPERLIPIDEMIA, UNSPECIFIED HYPERLIPIDEMIA TYPE: ICD-10-CM

## 2025-02-11 DIAGNOSIS — M54.41 CHRONIC RIGHT-SIDED LOW BACK PAIN WITH RIGHT-SIDED SCIATICA: ICD-10-CM

## 2025-02-11 DIAGNOSIS — F41.9 ANXIETY AND DEPRESSION: ICD-10-CM

## 2025-02-11 DIAGNOSIS — E11.9 TYPE 2 DIABETES MELLITUS WITHOUT COMPLICATION, WITHOUT LONG-TERM CURRENT USE OF INSULIN (HCC): Primary | ICD-10-CM

## 2025-02-11 DIAGNOSIS — F32.A ANXIETY AND DEPRESSION: ICD-10-CM

## 2025-02-11 DIAGNOSIS — I10 PRIMARY HYPERTENSION: ICD-10-CM

## 2025-02-11 DIAGNOSIS — B36.9 FUNGAL DERMATITIS: ICD-10-CM

## 2025-02-11 DIAGNOSIS — M25.551 PAIN OF RIGHT HIP JOINT: ICD-10-CM

## 2025-02-11 DIAGNOSIS — G89.29 CHRONIC RIGHT-SIDED LOW BACK PAIN WITH RIGHT-SIDED SCIATICA: ICD-10-CM

## 2025-02-11 RX ORDER — CLOTRIMAZOLE AND BETAMETHASONE DIPROPIONATE 10; .64 MG/G; MG/G
CREAM TOPICAL
Qty: 45 G | Refills: 0 | Status: SHIPPED | OUTPATIENT
Start: 2025-02-11

## 2025-02-13 DIAGNOSIS — F32.A ANXIETY AND DEPRESSION: ICD-10-CM

## 2025-02-13 DIAGNOSIS — I10 PRIMARY HYPERTENSION: ICD-10-CM

## 2025-02-13 DIAGNOSIS — E78.5 HYPERLIPIDEMIA, UNSPECIFIED HYPERLIPIDEMIA TYPE: ICD-10-CM

## 2025-02-13 DIAGNOSIS — F41.9 ANXIETY AND DEPRESSION: ICD-10-CM

## 2025-02-13 DIAGNOSIS — E11.9 TYPE 2 DIABETES MELLITUS WITHOUT COMPLICATION, WITHOUT LONG-TERM CURRENT USE OF INSULIN (HCC): Primary | ICD-10-CM

## 2025-02-17 RX ORDER — MEMANTINE HYDROCHLORIDE 5 MG/1
5 TABLET ORAL 2 TIMES DAILY
Qty: 60 TABLET | Refills: 0 | Status: SHIPPED | OUTPATIENT
Start: 2025-02-17

## 2025-02-24 SDOH — ECONOMIC STABILITY: FOOD INSECURITY: WITHIN THE PAST 12 MONTHS, THE FOOD YOU BOUGHT JUST DIDN'T LAST AND YOU DIDN'T HAVE MONEY TO GET MORE.: SOMETIMES TRUE

## 2025-02-24 SDOH — ECONOMIC STABILITY: FOOD INSECURITY: WITHIN THE PAST 12 MONTHS, YOU WORRIED THAT YOUR FOOD WOULD RUN OUT BEFORE YOU GOT MONEY TO BUY MORE.: SOMETIMES TRUE

## 2025-02-24 SDOH — ECONOMIC STABILITY: INCOME INSECURITY: IN THE LAST 12 MONTHS, WAS THERE A TIME WHEN YOU WERE NOT ABLE TO PAY THE MORTGAGE OR RENT ON TIME?: YES

## 2025-02-24 ASSESSMENT — PATIENT HEALTH QUESTIONNAIRE - PHQ9
2. FEELING DOWN, DEPRESSED OR HOPELESS: MORE THAN HALF THE DAYS
4. FEELING TIRED OR HAVING LITTLE ENERGY: MORE THAN HALF THE DAYS
6. FEELING BAD ABOUT YOURSELF - OR THAT YOU ARE A FAILURE OR HAVE LET YOURSELF OR YOUR FAMILY DOWN: SEVERAL DAYS
SUM OF ALL RESPONSES TO PHQ QUESTIONS 1-9: 18
6. FEELING BAD ABOUT YOURSELF - OR THAT YOU ARE A FAILURE OR HAVE LET YOURSELF OR YOUR FAMILY DOWN: SEVERAL DAYS
7. TROUBLE CONCENTRATING ON THINGS, SUCH AS READING THE NEWSPAPER OR WATCHING TELEVISION: NEARLY EVERY DAY
10. IF YOU CHECKED OFF ANY PROBLEMS, HOW DIFFICULT HAVE THESE PROBLEMS MADE IT FOR YOU TO DO YOUR WORK, TAKE CARE OF THINGS AT HOME, OR GET ALONG WITH OTHER PEOPLE: VERY DIFFICULT
5. POOR APPETITE OR OVEREATING: NEARLY EVERY DAY
10. IF YOU CHECKED OFF ANY PROBLEMS, HOW DIFFICULT HAVE THESE PROBLEMS MADE IT FOR YOU TO DO YOUR WORK, TAKE CARE OF THINGS AT HOME, OR GET ALONG WITH OTHER PEOPLE: VERY DIFFICULT
3. TROUBLE FALLING OR STAYING ASLEEP: NEARLY EVERY DAY
SUM OF ALL RESPONSES TO PHQ QUESTIONS 1-9: 18
8. MOVING OR SPEAKING SO SLOWLY THAT OTHER PEOPLE COULD HAVE NOTICED. OR THE OPPOSITE - BEING SO FIDGETY OR RESTLESS THAT YOU HAVE BEEN MOVING AROUND A LOT MORE THAN USUAL: MORE THAN HALF THE DAYS
2. FEELING DOWN, DEPRESSED OR HOPELESS: MORE THAN HALF THE DAYS
4. FEELING TIRED OR HAVING LITTLE ENERGY: MORE THAN HALF THE DAYS
7. TROUBLE CONCENTRATING ON THINGS, SUCH AS READING THE NEWSPAPER OR WATCHING TELEVISION: NEARLY EVERY DAY
SUM OF ALL RESPONSES TO PHQ9 QUESTIONS 1 & 2: 4
1. LITTLE INTEREST OR PLEASURE IN DOING THINGS: MORE THAN HALF THE DAYS
1. LITTLE INTEREST OR PLEASURE IN DOING THINGS: MORE THAN HALF THE DAYS
9. THOUGHTS THAT YOU WOULD BE BETTER OFF DEAD, OR OF HURTING YOURSELF: NOT AT ALL
SUM OF ALL RESPONSES TO PHQ QUESTIONS 1-9: 18
SUM OF ALL RESPONSES TO PHQ QUESTIONS 1-9: 18
8. MOVING OR SPEAKING SO SLOWLY THAT OTHER PEOPLE COULD HAVE NOTICED. OR THE OPPOSITE, BEING SO FIGETY OR RESTLESS THAT YOU HAVE BEEN MOVING AROUND A LOT MORE THAN USUAL: MORE THAN HALF THE DAYS
SUM OF ALL RESPONSES TO PHQ QUESTIONS 1-9: 18
9. THOUGHTS THAT YOU WOULD BE BETTER OFF DEAD, OR OF HURTING YOURSELF: NOT AT ALL
3. TROUBLE FALLING OR STAYING ASLEEP: NEARLY EVERY DAY
5. POOR APPETITE OR OVEREATING: NEARLY EVERY DAY

## 2025-02-25 ENCOUNTER — TELEPHONE (OUTPATIENT)
Dept: HEMATOLOGY | Age: 60
End: 2025-02-25

## 2025-02-25 NOTE — TELEPHONE ENCOUNTER
Called Patient and reminded patient of their appointment on 03/03/2025 and patient confirmed they would be here. Reminded patient to just come at appointment time, and to not come at the lab appointment time. Reminded patient that we will not check them in any more than 30 minutes before appointment time.  We have now moved to the [facility] [place] that is located between our old office and the ER at the [facility]. Letting the Pt know that our front entrance faces the  [employer] [place]. Reminded pt to eat well and be well hydrated for their labs.

## 2025-02-27 ENCOUNTER — OFFICE VISIT (OUTPATIENT)
Age: 60
End: 2025-02-27
Payer: COMMERCIAL

## 2025-02-27 VITALS
SYSTOLIC BLOOD PRESSURE: 128 MMHG | HEIGHT: 59 IN | HEART RATE: 69 BPM | DIASTOLIC BLOOD PRESSURE: 76 MMHG | WEIGHT: 199 LBS | BODY MASS INDEX: 40.12 KG/M2 | TEMPERATURE: 97.9 F | OXYGEN SATURATION: 96 %

## 2025-02-27 DIAGNOSIS — F03.90 DEMENTIA, UNSPECIFIED DEMENTIA SEVERITY, UNSPECIFIED DEMENTIA TYPE, UNSPECIFIED WHETHER BEHAVIORAL, PSYCHOTIC, OR MOOD DISTURBANCE OR ANXIETY (HCC): ICD-10-CM

## 2025-02-27 DIAGNOSIS — E11.9 TYPE 2 DIABETES MELLITUS WITHOUT COMPLICATION, WITHOUT LONG-TERM CURRENT USE OF INSULIN (HCC): Primary | ICD-10-CM

## 2025-02-27 DIAGNOSIS — E78.5 HYPERLIPIDEMIA, UNSPECIFIED HYPERLIPIDEMIA TYPE: ICD-10-CM

## 2025-02-27 DIAGNOSIS — F32.A ANXIETY AND DEPRESSION: ICD-10-CM

## 2025-02-27 DIAGNOSIS — F41.9 ANXIETY AND DEPRESSION: ICD-10-CM

## 2025-02-27 DIAGNOSIS — E11.9 TYPE 2 DIABETES MELLITUS WITHOUT COMPLICATION, WITHOUT LONG-TERM CURRENT USE OF INSULIN (HCC): ICD-10-CM

## 2025-02-27 DIAGNOSIS — I10 PRIMARY HYPERTENSION: ICD-10-CM

## 2025-02-27 LAB
ALBUMIN SERPL-MCNC: 4.1 G/DL (ref 3.5–5.2)
ALP SERPL-CCNC: 66 U/L (ref 35–104)
ALT SERPL-CCNC: 15 U/L (ref 5–33)
ANION GAP SERPL CALCULATED.3IONS-SCNC: 15 MMOL/L (ref 8–16)
AST SERPL-CCNC: 11 U/L (ref 5–32)
BILIRUB SERPL-MCNC: 0.4 MG/DL (ref 0.2–1.2)
BILIRUB UR QL STRIP: NEGATIVE
BUN SERPL-MCNC: 14 MG/DL (ref 8–23)
CALCIUM SERPL-MCNC: 9.3 MG/DL (ref 8.8–10.2)
CHLORIDE SERPL-SCNC: 104 MMOL/L (ref 98–107)
CHOLEST SERPL-MCNC: 126 MG/DL (ref 0–199)
CLARITY UR: ABNORMAL
CO2 SERPL-SCNC: 23 MMOL/L (ref 22–29)
COLOR UR: ABNORMAL
CREAT SERPL-MCNC: 0.7 MG/DL (ref 0.5–0.9)
CREAT UR-MCNC: 198.3 MG/DL (ref 28–217)
ERYTHROCYTE [DISTWIDTH] IN BLOOD BY AUTOMATED COUNT: 12.9 % (ref 11.5–14.5)
GLUCOSE SERPL-MCNC: 126 MG/DL (ref 70–99)
GLUCOSE UR STRIP.AUTO-MCNC: NEGATIVE MG/DL
HBA1C MFR BLD: 6.4 % (ref 4–5.6)
HCT VFR BLD AUTO: 43.8 % (ref 37–47)
HDLC SERPL-MCNC: 40 MG/DL (ref 40–60)
HGB BLD-MCNC: 13.8 G/DL (ref 12–16)
HGB UR STRIP.AUTO-MCNC: NEGATIVE MG/L
KETONES UR STRIP.AUTO-MCNC: NEGATIVE MG/DL
LDLC SERPL CALC-MCNC: 37 MG/DL
LEUKOCYTE ESTERASE UR QL STRIP.AUTO: NEGATIVE
MCH RBC QN AUTO: 30.3 PG (ref 27–31)
MCHC RBC AUTO-ENTMCNC: 31.5 G/DL (ref 33–37)
MCV RBC AUTO: 96.3 FL (ref 81–99)
MICROALBUMIN UR-MCNC: 3 MG/DL (ref 0–1.99)
MICROALBUMIN/CREAT UR-RTO: 15.1 MG/G (ref 0–29)
NITRITE UR QL STRIP.AUTO: NEGATIVE
PH UR STRIP.AUTO: 5.5 [PH] (ref 5–8)
PLATELET # BLD AUTO: 203 K/UL (ref 130–400)
PMV BLD AUTO: 9.2 FL (ref 9.4–12.3)
POTASSIUM SERPL-SCNC: 4.7 MMOL/L (ref 3.5–5.1)
PROT SERPL-MCNC: 7 G/DL (ref 6.4–8.3)
PROT UR STRIP.AUTO-MCNC: ABNORMAL MG/DL
RBC # BLD AUTO: 4.55 M/UL (ref 4.2–5.4)
SODIUM SERPL-SCNC: 142 MMOL/L (ref 136–145)
SP GR UR STRIP.AUTO: 1.03 (ref 1–1.03)
TRIGL SERPL-MCNC: 246 MG/DL (ref 0–149)
TSH SERPL DL<=0.005 MIU/L-ACNC: 1.52 UIU/ML (ref 0.27–4.2)
UROBILINOGEN UR STRIP.AUTO-MCNC: 0.2 E.U./DL
WBC # BLD AUTO: 7.8 K/UL (ref 4.8–10.8)

## 2025-02-27 PROCEDURE — 3078F DIAST BP <80 MM HG: CPT | Performed by: FAMILY MEDICINE

## 2025-02-27 PROCEDURE — 99214 OFFICE O/P EST MOD 30 MIN: CPT | Performed by: FAMILY MEDICINE

## 2025-02-27 PROCEDURE — 3074F SYST BP LT 130 MM HG: CPT | Performed by: FAMILY MEDICINE

## 2025-02-27 RX ORDER — MEMANTINE HYDROCHLORIDE 5 MG/1
5 TABLET ORAL 2 TIMES DAILY
Qty: 60 TABLET | Refills: 5 | Status: SHIPPED | OUTPATIENT
Start: 2025-02-27

## 2025-02-27 RX ORDER — BUSPIRONE HYDROCHLORIDE 15 MG/1
15 TABLET ORAL 3 TIMES DAILY
Qty: 180 TABLET | Refills: 0 | OUTPATIENT
Start: 2025-02-27

## 2025-02-27 RX ORDER — SEMAGLUTIDE 1.34 MG/ML
INJECTION, SOLUTION SUBCUTANEOUS
Qty: 3 ML | Refills: 0 | Status: CANCELLED | OUTPATIENT
Start: 2025-02-27

## 2025-02-27 ASSESSMENT — ENCOUNTER SYMPTOMS
GASTROINTESTINAL NEGATIVE: 1
EYES NEGATIVE: 1
ALLERGIC/IMMUNOLOGIC NEGATIVE: 1
RESPIRATORY NEGATIVE: 1
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
## 2025-03-02 DIAGNOSIS — C90.30 SOLITARY PLASMACYTOMA OF BONE (HCC): Primary | ICD-10-CM

## 2025-03-03 ENCOUNTER — HOSPITAL ENCOUNTER (OUTPATIENT)
Dept: INFUSION THERAPY | Age: 60
Discharge: HOME | End: 2025-03-03
Payer: MEDICARE

## 2025-03-03 ENCOUNTER — OFFICE VISIT (OUTPATIENT)
Dept: HEMATOLOGY | Age: 60
End: 2025-03-03
Payer: COMMERCIAL

## 2025-03-03 VITALS
TEMPERATURE: 98.6 F | WEIGHT: 199 LBS | HEART RATE: 75 BPM | BODY MASS INDEX: 40.12 KG/M2 | HEIGHT: 59 IN | OXYGEN SATURATION: 95 % | SYSTOLIC BLOOD PRESSURE: 124 MMHG | DIASTOLIC BLOOD PRESSURE: 78 MMHG

## 2025-03-03 DIAGNOSIS — M54.42 CHRONIC BILATERAL LOW BACK PAIN WITH BILATERAL SCIATICA: ICD-10-CM

## 2025-03-03 DIAGNOSIS — M54.41 CHRONIC BILATERAL LOW BACK PAIN WITH BILATERAL SCIATICA: ICD-10-CM

## 2025-03-03 DIAGNOSIS — C90.30 SOLITARY PLASMACYTOMA OF BONE (HCC): Primary | ICD-10-CM

## 2025-03-03 DIAGNOSIS — Z72.0 TOBACCO ABUSE: ICD-10-CM

## 2025-03-03 DIAGNOSIS — G89.29 CHRONIC BILATERAL LOW BACK PAIN WITH BILATERAL SCIATICA: ICD-10-CM

## 2025-03-03 DIAGNOSIS — C90.30 SOLITARY PLASMACYTOMA OF BONE (HCC): ICD-10-CM

## 2025-03-03 LAB
ALBUMIN SERPL-MCNC: 4.2 G/DL (ref 3.5–5.2)
ALP SERPL-CCNC: 72 U/L (ref 35–104)
ALT SERPL-CCNC: 16 U/L (ref 5–33)
ANION GAP SERPL CALCULATED.3IONS-SCNC: 12 MMOL/L (ref 7–19)
AST SERPL-CCNC: 17 U/L (ref 5–32)
BASOPHILS # BLD: 0.04 K/UL (ref 0–0.2)
BASOPHILS NFR BLD: 0.6 % (ref 0–1)
BILIRUB SERPL-MCNC: 0.4 MG/DL (ref 0–1.2)
BUN SERPL-MCNC: 15 MG/DL (ref 8–23)
CALCIUM SERPL-MCNC: 9.3 MG/DL (ref 8.8–10.2)
CHLORIDE SERPL-SCNC: 101 MMOL/L (ref 98–107)
CO2 SERPL-SCNC: 25 MMOL/L (ref 22–29)
CREAT SERPL-MCNC: 0.6 MG/DL (ref 0.5–0.9)
EOSINOPHIL # BLD: 0.49 K/UL (ref 0–0.6)
EOSINOPHIL NFR BLD: 6.8 % (ref 0–5)
ERYTHROCYTE [DISTWIDTH] IN BLOOD BY AUTOMATED COUNT: 12.9 % (ref 11.5–14.5)
GLUCOSE SERPL-MCNC: 177 MG/DL (ref 70–99)
HCT VFR BLD AUTO: 41.8 % (ref 37–47)
HGB BLD-MCNC: 14.2 G/DL (ref 12–16)
LYMPHOCYTES # BLD: 1.27 K/UL (ref 1.1–4.5)
LYMPHOCYTES NFR BLD: 17.7 % (ref 20–40)
MCH RBC QN AUTO: 31.1 PG (ref 27–31)
MCHC RBC AUTO-ENTMCNC: 34 G/DL (ref 33–37)
MCV RBC AUTO: 91.7 FL (ref 81–99)
MONOCYTES # BLD: 0.3 K/UL (ref 0–0.9)
MONOCYTES NFR BLD: 4.2 % (ref 1–10)
NEUTROPHILS # BLD: 5.06 K/UL (ref 1.5–7.5)
NEUTS SEG NFR BLD: 70.6 % (ref 50–65)
PLATELET # BLD AUTO: 207 K/UL (ref 130–400)
PMV BLD AUTO: 9.2 FL (ref 9.4–12.3)
POTASSIUM SERPL-SCNC: 4.2 MMOL/L (ref 3.5–5.1)
PROT SERPL-MCNC: 7 G/DL (ref 6.4–8.3)
RBC # BLD AUTO: 4.56 M/UL (ref 4.2–5.4)
SODIUM SERPL-SCNC: 138 MMOL/L (ref 136–145)
WBC # BLD AUTO: 7.17 K/UL (ref 4.8–10.8)

## 2025-03-03 PROCEDURE — 84165 PROTEIN E-PHORESIS SERUM: CPT

## 2025-03-03 PROCEDURE — 36415 COLL VENOUS BLD VENIPUNCTURE: CPT

## 2025-03-03 PROCEDURE — 99212 OFFICE O/P EST SF 10 MIN: CPT

## 2025-03-03 PROCEDURE — 82232 ASSAY OF BETA-2 PROTEIN: CPT

## 2025-03-03 PROCEDURE — 86334 IMMUNOFIX E-PHORESIS SERUM: CPT

## 2025-03-03 PROCEDURE — 82784 ASSAY IGA/IGD/IGG/IGM EACH: CPT

## 2025-03-03 PROCEDURE — 99213 OFFICE O/P EST LOW 20 MIN: CPT | Performed by: NURSE PRACTITIONER

## 2025-03-03 PROCEDURE — 85025 COMPLETE CBC W/AUTO DIFF WBC: CPT

## 2025-03-03 PROCEDURE — 83521 IG LIGHT CHAINS FREE EACH: CPT

## 2025-03-03 PROCEDURE — 84155 ASSAY OF PROTEIN SERUM: CPT

## 2025-03-03 PROCEDURE — 3078F DIAST BP <80 MM HG: CPT | Performed by: NURSE PRACTITIONER

## 2025-03-03 PROCEDURE — 3074F SYST BP LT 130 MM HG: CPT | Performed by: NURSE PRACTITIONER

## 2025-03-03 PROCEDURE — 80053 COMPREHEN METABOLIC PANEL: CPT

## 2025-03-03 PROCEDURE — 83883 ASSAY NEPHELOMETRY NOT SPEC: CPT

## 2025-03-03 RX ORDER — BUSPIRONE HYDROCHLORIDE 15 MG/1
15 TABLET ORAL 3 TIMES DAILY
Qty: 180 TABLET | Refills: 3 | Status: SHIPPED | OUTPATIENT
Start: 2025-03-03

## 2025-03-03 NOTE — PROGRESS NOTES
tablet 5    metFORMIN (GLUCOPHAGE) 500 MG tablet Take 1 tablet by mouth 2 times daily (with meals) 60 tablet 5    semaglutide, 2 MG/DOSE, (OZEMPIC) 8 MG/3ML SOPN sc injection Inject 2 mg into the skin every 7 days 3 mL 5    tiZANidine (ZANAFLEX) 4 MG tablet TAKE 1 TABLET BY MOUTH EVERY 8 HOURS AS NEEDED FOR  MUSCLE  PAIN 90 tablet 0    clotrimazole-betamethasone (LOTRISONE) 1-0.05 % cream Apply topically 2 times daily. Abdominal rash 45 g 0    simvastatin (ZOCOR) 20 MG tablet TAKE 1 TABLET BY MOUTH ONCE DAILY IN THE EVENING 90 tablet 0    ondansetron (ZOFRAN) 8 MG tablet TAKE 1 TABLET BY MOUTH EVERY 8 TO 12 HOURS AS NEEDED FOR NAUSEA 30 tablet 2    losartan (COZAAR) 50 MG tablet Take 1 tablet by mouth once daily 30 tablet 3    omeprazole (PRILOSEC) 40 MG delayed release capsule Take 1 capsule by mouth once daily 90 capsule 1    escitalopram (LEXAPRO) 20 MG tablet Take 1 tablet by mouth once daily 90 tablet 0    Docusate Sodium (DSS) 250 MG CAPS Take 250 mg by mouth in the morning and 250 mg in the evening. Take 250 mg by mouth every 12 hours. 30 capsule 5    oxyCODONE-acetaminophen (PERCOCET)  MG per tablet Take 1 tablet by mouth every 4 hours as needed for Pain.      blood glucose monitor strips 1 strip by Other route daily Test 1 times a day & as needed for symptoms of irregular blood glucose. Dispense sufficient amount for indicated testing frequency plus additional to accommodate PRN testing needs. 100 strip 5    naloxone 4 MG/0.1ML LIQD nasal spray       promethazine (PHENERGAN) 25 MG tablet       glucose monitoring (FREESTYLE FREEDOM) kit 1 kit by Does not apply route daily 1 kit 0    Elastic Bandages & Supports (LUMBAR BACK BRACE/SUPPORT PAD) [facility] Please provide patient with an ex- large [place] back brace. 1 each 1    calcium carbonate 600 MG TABS tablet Take 1 tablet by mouth daily      zoledronic acid (RECLAST) 5 MG/100ML SOLN Infuse 100 mLs intravenously once      vitamin D (ERGOCALCIFEROL) 1.25

## 2025-03-05 ASSESSMENT — ENCOUNTER SYMPTOMS
BACK PAIN: 1
EYE REDNESS: 0
EYE PAIN: 0
GASTROINTESTINAL NEGATIVE: 1
VOMITING: 0
WHEEZING: 0
EYES NEGATIVE: 1
DIARRHEA: 0
CONSTIPATION: 0
ABDOMINAL PAIN: 0
EYE DISCHARGE: 0
SHORTNESS OF BREATH: 0
RESPIRATORY NEGATIVE: 1
COUGH: 0
NAUSEA: 0
SORE THROAT: 0
BLOOD IN STOOL: 0

## 2025-03-06 LAB — B2 MICROGLOB SERPL-MCNC: 1.9 MG/L

## 2025-03-07 LAB
ALBUMIN SERPL-MCNC: 3.99 G/DL (ref 3.75–5.01)
ALPHA1 GLOB SERPL ELPH-MCNC: 0.28 G/DL (ref 0.19–0.46)
ALPHA2 GLOB SERPL ELPH-MCNC: 0.89 G/DL (ref 0.48–1.05)
B-GLOBULIN SERPL ELPH-MCNC: 0.78 G/DL (ref 0.48–1.1)
DEPRECATED KAPPA LC FREE/LAMBDA SER: 1.04 {RATIO} (ref 0.26–1.65)
EER MONOCLONAL PROTEIN AND FLC, SERUM: NORMAL
GAMMA GLOB SERPL ELPH-MCNC: 0.86 G/DL (ref 0.62–1.51)
IGA SERPL-MCNC: 184 MG/DL (ref 68–408)
IGG SERPL-MCNC: 856 MG/DL (ref 768–1632)
IGM SERPL-MCNC: 71 MG/DL (ref 35–263)
INTERPRETATION SERPL IFE-IMP: NORMAL
INTERPRETATION SERPL IFE-IMP: NORMAL
KAPPA LC FREE SER-MCNC: 14.45 MG/L (ref 3.3–19.4)
LAMBDA LC FREE SERPL-MCNC: 13.91 MG/L (ref 5.71–26.3)
MONOCLONAL PROTEIN, SERUM: NORMAL G/DL
PROT SERPL-MCNC: 6.8 G/DL (ref 6.3–8.2)

## 2025-03-10 ENCOUNTER — RESULTS FOLLOW-UP (OUTPATIENT)
Dept: INFUSION THERAPY | Age: 60
End: 2025-03-10

## 2025-03-17 DIAGNOSIS — E11.9 TYPE 2 DIABETES MELLITUS WITHOUT COMPLICATION, WITHOUT LONG-TERM CURRENT USE OF INSULIN: ICD-10-CM

## 2025-03-17 RX ORDER — GLUCOSAMINE HCL/CHONDROITIN SU 500-400 MG
1 CAPSULE ORAL DAILY
Qty: 100 STRIP | Refills: 5 | Status: SHIPPED | OUTPATIENT
Start: 2025-03-17

## 2025-03-31 RX ORDER — ONDANSETRON 8 MG/1
TABLET, FILM COATED ORAL
Qty: 30 TABLET | Refills: 5 | Status: SHIPPED | OUTPATIENT
Start: 2025-03-31

## 2025-04-10 DIAGNOSIS — B36.9 FUNGAL DERMATITIS: ICD-10-CM

## 2025-04-11 DIAGNOSIS — M25.551 PAIN OF RIGHT HIP JOINT: ICD-10-CM

## 2025-04-11 DIAGNOSIS — M54.41 CHRONIC RIGHT-SIDED LOW BACK PAIN WITH RIGHT-SIDED SCIATICA: ICD-10-CM

## 2025-04-11 DIAGNOSIS — G89.29 CHRONIC RIGHT-SIDED LOW BACK PAIN WITH RIGHT-SIDED SCIATICA: ICD-10-CM

## 2025-04-11 RX ORDER — CLOTRIMAZOLE AND BETAMETHASONE DIPROPIONATE 10; .64 MG/G; MG/G
CREAM TOPICAL
Qty: 45 G | Refills: 0 | Status: SHIPPED | OUTPATIENT
Start: 2025-04-11

## 2025-04-11 RX ORDER — ONDANSETRON 8 MG/1
TABLET, FILM COATED ORAL
Qty: 30 TABLET | Refills: 0 | Status: SHIPPED | OUTPATIENT
Start: 2025-04-11

## 2025-05-04 DIAGNOSIS — E78.5 HYPERLIPIDEMIA, UNSPECIFIED HYPERLIPIDEMIA TYPE: ICD-10-CM

## 2025-05-04 DIAGNOSIS — I10 PRIMARY HYPERTENSION: ICD-10-CM

## 2025-05-05 RX ORDER — LOSARTAN POTASSIUM 50 MG/1
50 TABLET ORAL DAILY
Qty: 90 TABLET | Refills: 3 | Status: SHIPPED | OUTPATIENT
Start: 2025-05-05

## 2025-05-05 RX ORDER — OMEPRAZOLE 40 MG/1
40 CAPSULE, DELAYED RELEASE ORAL DAILY
Qty: 90 CAPSULE | Refills: 3 | Status: SHIPPED | OUTPATIENT
Start: 2025-05-05

## 2025-05-05 RX ORDER — SIMVASTATIN 20 MG
20 TABLET ORAL NIGHTLY
Qty: 90 TABLET | Refills: 3 | Status: SHIPPED | OUTPATIENT
Start: 2025-05-05

## 2025-05-05 RX ORDER — ESCITALOPRAM OXALATE 20 MG/1
20 TABLET ORAL DAILY
Qty: 90 TABLET | Refills: 3 | Status: SHIPPED | OUTPATIENT
Start: 2025-05-05

## 2025-05-27 DIAGNOSIS — E11.9 TYPE 2 DIABETES MELLITUS WITHOUT COMPLICATION, WITHOUT LONG-TERM CURRENT USE OF INSULIN (HCC): ICD-10-CM

## 2025-06-21 SDOH — HEALTH STABILITY: PHYSICAL HEALTH: ON AVERAGE, HOW MANY DAYS PER WEEK DO YOU ENGAGE IN MODERATE TO STRENUOUS EXERCISE (LIKE A BRISK WALK)?: 3 DAYS

## 2025-06-21 SDOH — HEALTH STABILITY: PHYSICAL HEALTH: ON AVERAGE, HOW MANY MINUTES DO YOU ENGAGE IN EXERCISE AT THIS LEVEL?: 20 MIN

## 2025-06-21 ASSESSMENT — PATIENT HEALTH QUESTIONNAIRE - PHQ9
SUM OF ALL RESPONSES TO PHQ QUESTIONS 1-9: 2
1. LITTLE INTEREST OR PLEASURE IN DOING THINGS: SEVERAL DAYS
2. FEELING DOWN, DEPRESSED OR HOPELESS: SEVERAL DAYS
SUM OF ALL RESPONSES TO PHQ QUESTIONS 1-9: 2

## 2025-06-21 ASSESSMENT — LIFESTYLE VARIABLES
HOW MANY STANDARD DRINKS CONTAINING ALCOHOL DO YOU HAVE ON A TYPICAL DAY: 1 OR 2
HOW MANY STANDARD DRINKS CONTAINING ALCOHOL DO YOU HAVE ON A TYPICAL DAY: 1
HOW OFTEN DO YOU HAVE A DRINK CONTAINING ALCOHOL: 2
HOW OFTEN DO YOU HAVE SIX OR MORE DRINKS ON ONE OCCASION: 1
HOW OFTEN DO YOU HAVE A DRINK CONTAINING ALCOHOL: MONTHLY OR LESS

## 2025-06-24 ENCOUNTER — RESULTS FOLLOW-UP (OUTPATIENT)
Age: 60
End: 2025-06-24

## 2025-06-24 ENCOUNTER — OFFICE VISIT (OUTPATIENT)
Age: 60
End: 2025-06-24
Payer: MEDICARE

## 2025-06-24 VITALS
SYSTOLIC BLOOD PRESSURE: 130 MMHG | HEART RATE: 87 BPM | HEIGHT: 60 IN | TEMPERATURE: 97.5 F | WEIGHT: 191 LBS | BODY MASS INDEX: 37.5 KG/M2 | OXYGEN SATURATION: 97 % | DIASTOLIC BLOOD PRESSURE: 70 MMHG

## 2025-06-24 DIAGNOSIS — E11.9 TYPE 2 DIABETES MELLITUS WITHOUT COMPLICATION, WITHOUT LONG-TERM CURRENT USE OF INSULIN (HCC): ICD-10-CM

## 2025-06-24 DIAGNOSIS — R42 VERTIGO: ICD-10-CM

## 2025-06-24 DIAGNOSIS — E66.01 MORBID (SEVERE) OBESITY DUE TO EXCESS CALORIES (HCC): ICD-10-CM

## 2025-06-24 DIAGNOSIS — Z91.81 AT HIGH RISK FOR FALLS: ICD-10-CM

## 2025-06-24 DIAGNOSIS — Z87.891 PERSONAL HISTORY OF TOBACCO USE: ICD-10-CM

## 2025-06-24 DIAGNOSIS — Z23 NEED FOR PNEUMOCOCCAL 20-VALENT CONJUGATE VACCINATION: ICD-10-CM

## 2025-06-24 DIAGNOSIS — Z00.00 MEDICARE ANNUAL WELLNESS VISIT, SUBSEQUENT: Primary | ICD-10-CM

## 2025-06-24 DIAGNOSIS — C90.30 SOLITARY PLASMACYTOMA OF BONE (HCC): ICD-10-CM

## 2025-06-24 DIAGNOSIS — Z12.31 ENCOUNTER FOR SCREENING MAMMOGRAM FOR BREAST CANCER: ICD-10-CM

## 2025-06-24 LAB
ALBUMIN SERPL-MCNC: 4.3 G/DL (ref 3.5–5.2)
ALP SERPL-CCNC: 78 U/L (ref 35–104)
ALT SERPL-CCNC: 24 U/L (ref 10–35)
ANION GAP SERPL CALCULATED.3IONS-SCNC: 14 MMOL/L (ref 8–16)
AST SERPL-CCNC: 21 U/L (ref 10–35)
BILIRUB SERPL-MCNC: 0.4 MG/DL (ref 0.2–1.2)
BILIRUB UR QL STRIP: NEGATIVE
BUN SERPL-MCNC: 21 MG/DL (ref 8–23)
CALCIUM SERPL-MCNC: 9.9 MG/DL (ref 8.8–10.2)
CHLORIDE SERPL-SCNC: 102 MMOL/L (ref 98–107)
CHOLEST SERPL-MCNC: 110 MG/DL (ref 0–199)
CLARITY UR: ABNORMAL
CO2 SERPL-SCNC: 24 MMOL/L (ref 22–29)
COLOR UR: ABNORMAL
CREAT SERPL-MCNC: 0.7 MG/DL (ref 0.5–0.9)
CREAT UR-MCNC: 196 MG/DL (ref 28–217)
ERYTHROCYTE [DISTWIDTH] IN BLOOD BY AUTOMATED COUNT: 13.2 % (ref 11.5–14.5)
GLUCOSE SERPL-MCNC: 113 MG/DL (ref 70–99)
GLUCOSE UR STRIP.AUTO-MCNC: NEGATIVE MG/DL
HBA1C MFR BLD: 5.9 % (ref 4–5.6)
HCT VFR BLD AUTO: 44.6 % (ref 37–47)
HDLC SERPL-MCNC: 37 MG/DL (ref 40–60)
HGB BLD-MCNC: 14.7 G/DL (ref 12–16)
HGB UR STRIP.AUTO-MCNC: NEGATIVE MG/L
KETONES UR STRIP.AUTO-MCNC: ABNORMAL MG/DL
LDLC SERPL CALC-MCNC: 38 MG/DL
LEUKOCYTE ESTERASE UR QL STRIP.AUTO: NEGATIVE
MCH RBC QN AUTO: 30.9 PG (ref 27–31)
MCHC RBC AUTO-ENTMCNC: 33 G/DL (ref 33–37)
MCV RBC AUTO: 93.9 FL (ref 81–99)
MICROALBUMIN UR-MCNC: <1.2 MG/DL (ref 0–1.99)
MICROALBUMIN/CREAT UR-RTO: NORMAL MG/G (ref 0–29)
NITRITE UR QL STRIP.AUTO: NEGATIVE
PH UR STRIP.AUTO: 5.5 [PH] (ref 5–8)
PLATELET # BLD AUTO: 268 K/UL (ref 130–400)
PMV BLD AUTO: 9.7 FL (ref 9.4–12.3)
POTASSIUM SERPL-SCNC: 4 MMOL/L (ref 3.5–5.1)
PROT SERPL-MCNC: 7.2 G/DL (ref 6.4–8.3)
PROT UR STRIP.AUTO-MCNC: ABNORMAL MG/DL
RBC # BLD AUTO: 4.75 M/UL (ref 4.2–5.4)
SODIUM SERPL-SCNC: 140 MMOL/L (ref 136–145)
SP GR UR STRIP.AUTO: 1.03 (ref 1–1.03)
TRIGL SERPL-MCNC: 177 MG/DL (ref 0–149)
UROBILINOGEN UR STRIP.AUTO-MCNC: 0.2 E.U./DL
WBC # BLD AUTO: 7.7 K/UL (ref 4.8–10.8)

## 2025-06-24 PROCEDURE — 3075F SYST BP GE 130 - 139MM HG: CPT | Performed by: FAMILY MEDICINE

## 2025-06-24 PROCEDURE — 3044F HG A1C LEVEL LT 7.0%: CPT | Performed by: FAMILY MEDICINE

## 2025-06-24 PROCEDURE — G0296 VISIT TO DETERM LDCT ELIG: HCPCS | Performed by: FAMILY MEDICINE

## 2025-06-24 PROCEDURE — G0009 ADMIN PNEUMOCOCCAL VACCINE: HCPCS | Performed by: FAMILY MEDICINE

## 2025-06-24 PROCEDURE — 3078F DIAST BP <80 MM HG: CPT | Performed by: FAMILY MEDICINE

## 2025-06-24 PROCEDURE — G0439 PPPS, SUBSEQ VISIT: HCPCS | Performed by: FAMILY MEDICINE

## 2025-06-24 PROCEDURE — 90677 PCV20 VACCINE IM: CPT | Performed by: FAMILY MEDICINE

## 2025-06-24 RX ORDER — OXYCODONE AND ACETAMINOPHEN 7.5; 325 MG/1; MG/1
1 TABLET ORAL EVERY 12 HOURS
COMMUNITY
Start: 2025-05-18

## 2025-06-24 SDOH — ECONOMIC STABILITY: FOOD INSECURITY: WITHIN THE PAST 12 MONTHS, THE FOOD YOU BOUGHT JUST DIDN'T LAST AND YOU DIDN'T HAVE MONEY TO GET MORE.: NEVER TRUE

## 2025-06-24 SDOH — ECONOMIC STABILITY: FOOD INSECURITY: WITHIN THE PAST 12 MONTHS, YOU WORRIED THAT YOUR FOOD WOULD RUN OUT BEFORE YOU GOT MONEY TO BUY MORE.: NEVER TRUE

## 2025-06-24 NOTE — PATIENT INSTRUCTIONS
orlistat with a high-fat meal (if more than 30 percent of calories in the meal are from fat). Side effects usually improve as you learn to avoid high-fat foods. Severe liver injury has been reported rarely in patients taking orlistat, but it is not known if orlistat caused the liver problems.  Diet supplements -- Diet supplements are widely used by people who are trying to lose weight, although the safety and efficacy of these supplements are often unproven. A few of the more common diet supplements are discussed below; none of these are recommended because they have not been studied carefully, and there is no proof they are safe or effective.  Chitosan and wheat dextrin are ineffective for weight loss, and their use is not recommended.   Ephedra, a compound related to ephedrine, is no longer available in the [place] due to safety concerns. Many nonprescription diet pills previously contained ephedra. Although some studies have shown that ephedra helps with weight loss, there can be serious side effects (psychiatric symptoms, palpitations, and stomach upset), including death.   There are not enough data about the safety and efficacy of chromium, ginseng, glucomannan, green tea, hydroxycitric acid, L carnitine, psyllium, pyruvate supplements, [place] wort, and conjugated linoleic acid.   Two supplements from [place], Emagrece Sim (also known as the [place] diet pill) and Herbathin dietary supplement, have been shown to contain prescription drugs.   Hoodia gordonii is a dietary supplement derived from a plant in [place]. It is not recommended because there is no proof that it is safe or effective.   Bitter orange (Citrus aurantium) can increase your heart rate and blood pressure and is not recommended.  SHOULD I HAVE SURGERY TO LOSE WEIGHT? -- Weight loss surgery is recommended ONLY for people with one of the following:  Severe obesity (body mass index above 40) (calculator 1 and calculator 2) who have

## 2025-06-24 NOTE — PROGRESS NOTES
After obtaining consent, and per orders of Dr. Mojica, injection of Prevnar 20 given in Left deltoid by Milvia Dumont MA. Patient instructed to remain in clinic for 20 minutes afterwards, and to report any adverse reaction to me immediately.

## 2025-06-24 NOTE — PROGRESS NOTES
Medicare Annual Wellness Visit    Lynn Mendoza is here for Medicare AWV (Yearly check up /)    Assessment & Plan   Medicare annual wellness visit, subsequent  Solitary plasmacytoma of bone (HCC)-stable.  Followed by oncology  Type 2 diabetes mellitus without complication, without long-term current use of insulin (HCC)-stable  -     Lipid Panel; Future  -     Hemoglobin A1C; Future  -     Comprehensive Metabolic Panel; Future  -     CBC; Future  -     Albumin/Creatinine Ratio, Urine; Future  -     Urinalysis; Future  Encounter for screening mammogram for breast cancer  -     YASMIN DIGITAL SCREEN W OR WO CAD BILATERAL; Future  Need for pneumococcal 20-valent conjugate vaccination  -     Pneumococcal, PCV20, PREVNAR 20, (age 6w+), IM, PF  Vertigo-recurrent  -     External Referral To Physical Therapy  At high risk for falls  -     External Referral To Physical Therapy  Morbid (severe) obesity due to excess calories (E66.01)  Body mass index [BMI] 37.0-37.9, adult (Z68.37)  Personal history of tobacco use  -     CO VISIT TO DISCUSS LUNG CA SCREEN W LDCT  -     CT Lung Screen (Initial/Annual/Baseline); Future       Return in 3 months (on 9/24/2025).     Subjective   The following acute and/or chronic problems were also addressed today:  Patient here for evaluation of Medicare wellness.  She is also diabetic.  She is due for blood work.  She is usually well-controlled.  Takes metformin and Ozempic.  She also have a history of solitary plasmacytoma of the bone.  She sees oncology.  She is on remission she has follow-up with Julia Paris.  She complained of fall secondary to vertigo.  Seems to be positional.  She never done physical therapy for vestibular exercises.  Health maintenance  Patient is due for mammogram, lung cancer screening and Prevnar vaccine.  She is obese and she been trying to lose weight with Ozempic but unfortunately she have no significant weight loss.  She cannot exercise much secondary to chronic back

## 2025-07-01 ENCOUNTER — HOSPITAL ENCOUNTER (OUTPATIENT)
Dept: GENERAL RADIOLOGY | Age: 60
Discharge: HOME | End: 2025-07-01
Payer: MEDICARE

## 2025-07-01 DIAGNOSIS — Z87.891 PERSONAL HISTORY OF TOBACCO USE: ICD-10-CM

## 2025-07-01 PROCEDURE — 71271 CT THORAX LUNG CANCER SCR C-: CPT

## 2025-07-05 DIAGNOSIS — E11.9 TYPE 2 DIABETES MELLITUS WITHOUT COMPLICATION, WITHOUT LONG-TERM CURRENT USE OF INSULIN (HCC): ICD-10-CM

## 2025-07-31 DIAGNOSIS — G89.29 CHRONIC RIGHT-SIDED LOW BACK PAIN WITH RIGHT-SIDED SCIATICA: ICD-10-CM

## 2025-07-31 DIAGNOSIS — M54.41 CHRONIC RIGHT-SIDED LOW BACK PAIN WITH RIGHT-SIDED SCIATICA: ICD-10-CM

## 2025-07-31 DIAGNOSIS — M25.551 PAIN OF RIGHT HIP JOINT: ICD-10-CM

## 2025-08-29 DIAGNOSIS — C90.30 SOLITARY PLASMACYTOMA OF BONE (HCC): Primary | ICD-10-CM

## 2025-09-02 ENCOUNTER — TELEPHONE (OUTPATIENT)
Dept: HEMATOLOGY | Age: 60
End: 2025-09-02

## 2025-09-04 ENCOUNTER — HOSPITAL ENCOUNTER (OUTPATIENT)
Dept: INFUSION THERAPY | Age: 60
Discharge: HOME | End: 2025-09-04
Payer: MEDICARE

## 2025-09-04 ENCOUNTER — OFFICE VISIT (OUTPATIENT)
Dept: HEMATOLOGY | Age: 60
End: 2025-09-04
Payer: MEDICARE

## 2025-09-04 VITALS
HEART RATE: 73 BPM | HEIGHT: 60 IN | TEMPERATURE: 97.3 F | WEIGHT: 193.3 LBS | OXYGEN SATURATION: 99 % | SYSTOLIC BLOOD PRESSURE: 146 MMHG | DIASTOLIC BLOOD PRESSURE: 90 MMHG | BODY MASS INDEX: 37.95 KG/M2

## 2025-09-04 DIAGNOSIS — C90.30 SOLITARY PLASMACYTOMA OF BONE (HCC): ICD-10-CM

## 2025-09-04 DIAGNOSIS — Z72.0 TOBACCO ABUSE: ICD-10-CM

## 2025-09-04 DIAGNOSIS — C90.30 SOLITARY PLASMACYTOMA OF BONE (HCC): Primary | ICD-10-CM

## 2025-09-04 LAB
ALBUMIN SERPL-MCNC: 4.2 G/DL (ref 3.5–5.2)
ALP SERPL-CCNC: 73 U/L (ref 35–104)
ALT SERPL-CCNC: 20 U/L (ref 5–33)
ANION GAP SERPL CALCULATED.3IONS-SCNC: 13 MMOL/L (ref 7–19)
AST SERPL-CCNC: 18 U/L (ref 5–32)
BASOPHILS # BLD: 0.04 K/UL (ref 0–0.2)
BASOPHILS NFR BLD: 0.5 % (ref 0–1)
BILIRUB SERPL-MCNC: 0.4 MG/DL (ref 0–1.2)
BUN SERPL-MCNC: 25 MG/DL (ref 8–23)
CALCIUM SERPL-MCNC: 9.3 MG/DL (ref 8.8–10.2)
CHLORIDE SERPL-SCNC: 103 MMOL/L (ref 98–107)
CO2 SERPL-SCNC: 23 MMOL/L (ref 22–29)
CREAT SERPL-MCNC: 0.8 MG/DL (ref 0.5–0.9)
EOSINOPHIL # BLD: 0.41 K/UL (ref 0–0.6)
EOSINOPHIL NFR BLD: 5 % (ref 0–5)
ERYTHROCYTE [DISTWIDTH] IN BLOOD BY AUTOMATED COUNT: 13.2 % (ref 11.5–14.5)
GLUCOSE SERPL-MCNC: 152 MG/DL (ref 70–99)
HCT VFR BLD AUTO: 42.4 % (ref 37–47)
HGB BLD-MCNC: 14.4 G/DL (ref 12–16)
LYMPHOCYTES # BLD: 1.48 K/UL (ref 1.1–4.5)
LYMPHOCYTES NFR BLD: 17.9 % (ref 20–40)
MCH RBC QN AUTO: 31.9 PG (ref 27–31)
MCHC RBC AUTO-ENTMCNC: 34 G/DL (ref 33–37)
MCV RBC AUTO: 93.8 FL (ref 81–99)
MONOCYTES # BLD: 0.39 K/UL (ref 0–0.9)
MONOCYTES NFR BLD: 4.7 % (ref 1–10)
NEUTROPHILS # BLD: 5.92 K/UL (ref 1.5–7.5)
NEUTS SEG NFR BLD: 71.5 % (ref 50–65)
PLATELET # BLD AUTO: 235 K/UL (ref 130–400)
PMV BLD AUTO: 9.2 FL (ref 9.4–12.3)
POTASSIUM SERPL-SCNC: 4.4 MMOL/L (ref 3.5–5.1)
PROT SERPL-MCNC: 7.3 G/DL (ref 6.4–8.3)
RBC # BLD AUTO: 4.52 M/UL (ref 4.2–5.4)
SODIUM SERPL-SCNC: 139 MMOL/L (ref 136–145)
WBC # BLD AUTO: 8.27 K/UL (ref 4.8–10.8)

## 2025-09-04 PROCEDURE — 84155 ASSAY OF PROTEIN SERUM: CPT

## 2025-09-04 PROCEDURE — 36415 COLL VENOUS BLD VENIPUNCTURE: CPT

## 2025-09-04 PROCEDURE — 3077F SYST BP >= 140 MM HG: CPT | Performed by: NURSE PRACTITIONER

## 2025-09-04 PROCEDURE — 84165 PROTEIN E-PHORESIS SERUM: CPT

## 2025-09-04 PROCEDURE — 80053 COMPREHEN METABOLIC PANEL: CPT

## 2025-09-04 PROCEDURE — 3080F DIAST BP >= 90 MM HG: CPT | Performed by: NURSE PRACTITIONER

## 2025-09-04 PROCEDURE — 85025 COMPLETE CBC W/AUTO DIFF WBC: CPT

## 2025-09-04 PROCEDURE — 82784 ASSAY IGA/IGD/IGG/IGM EACH: CPT

## 2025-09-04 PROCEDURE — 83883 ASSAY NEPHELOMETRY NOT SPEC: CPT

## 2025-09-04 PROCEDURE — 82232 ASSAY OF BETA-2 PROTEIN: CPT

## 2025-09-04 PROCEDURE — 83521 IG LIGHT CHAINS FREE EACH: CPT

## 2025-09-04 PROCEDURE — 86334 IMMUNOFIX E-PHORESIS SERUM: CPT

## 2025-09-04 PROCEDURE — 99213 OFFICE O/P EST LOW 20 MIN: CPT | Performed by: NURSE PRACTITIONER

## 2025-09-04 RX ORDER — MELOXICAM 15 MG/1
15 TABLET ORAL DAILY
COMMUNITY
Start: 2025-08-06

## 2025-09-04 ASSESSMENT — ENCOUNTER SYMPTOMS
BLOOD IN STOOL: 0
SORE THROAT: 0
COUGH: 0
VOMITING: 0
RESPIRATORY NEGATIVE: 1
ABDOMINAL PAIN: 0
EYE PAIN: 0
WHEEZING: 0
BACK PAIN: 1
GASTROINTESTINAL NEGATIVE: 1
EYE REDNESS: 0
EYE DISCHARGE: 0
NAUSEA: 0
EYES NEGATIVE: 1
DIARRHEA: 0
CONSTIPATION: 0
SHORTNESS OF BREATH: 0

## 2025-09-07 LAB — B2 MICROGLOB SERPL-MCNC: 1.9 MG/L

## (undated) DEVICE — CVR UNIV C/ARM

## (undated) DEVICE — INTENDED FOR TISSUE SEPARATION, AND OTHER PROCEDURES THAT REQUIRE A SHARP SURGICAL BLADE TO PUNCTURE OR CUT.: Brand: BARD-PARKER ® STAINLESS STEEL BLADES

## (undated) DEVICE — HANDPIECE SET WITH HIGH FLOW TIP AND SUCTION TUBE: Brand: INTERPULSE

## (undated) DEVICE — Device: Brand: PROPHECY INFINITY

## (undated) DEVICE — APPL DURAPREP IODOPHOR APL 26ML

## (undated) DEVICE — BANDAGE,GAUZE,BULKEE II,4.5"X4.1YD,STRL: Brand: MEDLINE

## (undated) DEVICE — BNDG ELAS W/CLIP 6IN 10YD LF STRL

## (undated) DEVICE — DRSNG GZ CURAD XEROFORM NONADHS 5X9IN STRL

## (undated) DEVICE — BNDG ESMARK 4IN 9FT LF STRL BLU

## (undated) DEVICE — BNDG CURAD ADHS 4IN WHT

## (undated) DEVICE — GOWN,NON-REINFORCED,SIRUS,SET IN SLV,XL: Brand: MEDLINE

## (undated) DEVICE — UNDERCAST PADDING: Brand: DEROYAL

## (undated) DEVICE — ANTIBACTERIAL UNDYED BRAIDED (POLYGLACTIN 910), SYNTHETIC ABSORBABLE SUTURE: Brand: COATED VICRYL

## (undated) DEVICE — DISPOSABLE TOURNIQUET CUFF SINGLE BLADDER, SINGLE PORT AND QUICK CONNECT CONNECTOR: Brand: COLOR CUFF

## (undated) DEVICE — 4-PORT MANIFOLD: Brand: NEPTUNE 2

## (undated) DEVICE — STEINMANN PIN
Type: IMPLANTABLE DEVICE | Status: NON-FUNCTIONAL
Brand: INBONE
Removed: 2017-12-19

## (undated) DEVICE — WIPE THERAWASH SLV SPEC CARE 2PK

## (undated) DEVICE — SPNG GZ WOVN 4X4IN 12PLY 10/BX STRL

## (undated) DEVICE — TRY SKINPREP WET CHG 4PCT SPNG HIB

## (undated) DEVICE — PAD,PREPPING,CUFFED,24X48,7",NONSTERILE: Brand: MEDLINE

## (undated) DEVICE — GLV SURG TRIUMPH ORTHO W/ALOE PF LTX 8 STRL

## (undated) DEVICE — PK TURNOVER RM ADV

## (undated) DEVICE — PRECISION THIN (9.0 X 0.38 X 25.0MM)

## (undated) DEVICE — TIBIAL CORNER DRILL: Brand: INFINITY

## (undated) DEVICE — SCREW, BONE REMOVER: Brand: INBONE

## (undated) DEVICE — PENCL E/S HNDSWCH ROCKR CB

## (undated) DEVICE — TEMP FIX PIN TALAR RESECT GUIDE: Brand: INFINITY

## (undated) DEVICE — BLD SAW RECIP DBL SIDED 1.1X68MM STRL

## (undated) DEVICE — SUT ETHLN 3/0 FS1 30IN 669H

## (undated) DEVICE — PK EXTRM 30

## (undated) DEVICE — TALAR PEG DRILL: Brand: INBONE

## (undated) DEVICE — DRP C/ARMOR

## (undated) DEVICE — TALAR REAMER: Brand: INFINITY

## (undated) DEVICE — TOTAL TRAY, 16FR 10ML SIL FOLEY, URN: Brand: MEDLINE

## (undated) DEVICE — 3M™ STERI-DRAPE™ INSTRUMENT POUCH 1018: Brand: STERI-DRAPE™

## (undated) DEVICE — 4.0MM EGG BUR